# Patient Record
Sex: FEMALE | Race: WHITE | NOT HISPANIC OR LATINO | Employment: UNEMPLOYED | URBAN - METROPOLITAN AREA
[De-identification: names, ages, dates, MRNs, and addresses within clinical notes are randomized per-mention and may not be internally consistent; named-entity substitution may affect disease eponyms.]

---

## 2018-01-09 ENCOUNTER — CONVERSION ENCOUNTER (OUTPATIENT)
Dept: FAMILY MEDICINE CLINIC | Facility: CLINIC | Age: 58
End: 2018-01-09

## 2018-01-09 ENCOUNTER — OFFICE VISIT CONVERTED (OUTPATIENT)
Dept: FAMILY MEDICINE CLINIC | Facility: CLINIC | Age: 58
End: 2018-01-09
Attending: NURSE PRACTITIONER

## 2019-04-01 ENCOUNTER — HOSPITAL ENCOUNTER (OUTPATIENT)
Dept: URGENT CARE | Facility: CLINIC | Age: 59
Discharge: HOME | End: 2019-04-01

## 2020-02-17 ENCOUNTER — HOSPITAL ENCOUNTER (OUTPATIENT)
Dept: OTHER | Facility: HOSPITAL | Age: 60
Discharge: HOME | End: 2020-02-17
Attending: INTERNAL MEDICINE

## 2020-02-17 LAB
25(OH)D3 SERPL-MCNC: 19.7 NG/ML (ref 30–100)
ALBUMIN SERPL-MCNC: 4.2 G/DL (ref 3.5–5)
ALBUMIN/GLOB SERPL: 1.4 {RATIO} (ref 1.4–2.6)
ALP SERPL-CCNC: 83 U/L (ref 53–141)
ALT SERPL-CCNC: 12 U/L (ref 10–40)
ANION GAP SERPL CALC-SCNC: 19 MMOL/L (ref 8–19)
APPEARANCE UR: ABNORMAL
AST SERPL-CCNC: 20 U/L (ref 15–50)
BASOPHILS # BLD AUTO: 0.03 10*3/UL (ref 0–0.2)
BASOPHILS NFR BLD AUTO: 0.5 % (ref 0–3)
BILIRUB SERPL-MCNC: 0.17 MG/DL (ref 0.2–1.3)
BILIRUB UR QL: NEGATIVE
BUN SERPL-MCNC: 16 MG/DL (ref 5–25)
BUN/CREAT SERPL: 18 {RATIO} (ref 6–20)
CALCIUM SERPL-MCNC: 9.3 MG/DL (ref 8.7–10.4)
CHLORIDE SERPL-SCNC: 95 MMOL/L (ref 99–111)
COLOR UR: ABNORMAL
CONV ABS IMM GRAN: 0.02 10*3/UL (ref 0–0.2)
CONV BACTERIA: ABNORMAL
CONV CO2: 29 MMOL/L (ref 22–32)
CONV COLLECTION SOURCE (UA): ABNORMAL
CONV CREATININE URINE, RANDOM: 175.1 MG/DL (ref 10–300)
CONV HYALINE CASTS IN URINE MICRO: ABNORMAL /[LPF]
CONV IMMATURE GRAN: 0.3 % (ref 0–1.8)
CONV MICROALBUM.,U,RANDOM: 41.3 MG/L (ref 0–20)
CONV TOTAL PROTEIN: 7.3 G/DL (ref 6.3–8.2)
CONV UROBILINOGEN IN URINE BY AUTOMATED TEST STRIP: 1 {EHRLICHU}/DL (ref 0.1–1)
CREAT UR-MCNC: 0.87 MG/DL (ref 0.5–0.9)
DEPRECATED RDW RBC AUTO: 51.3 FL (ref 36.4–46.3)
EOSINOPHIL # BLD AUTO: 0.27 10*3/UL (ref 0–0.7)
EOSINOPHIL # BLD AUTO: 4.6 % (ref 0–7)
ERYTHROCYTE [DISTWIDTH] IN BLOOD BY AUTOMATED COUNT: 17.3 % (ref 11.7–14.4)
GFR SERPLBLD BASED ON 1.73 SQ M-ARVRAT: >60 ML/MIN/{1.73_M2}
GLOBULIN UR ELPH-MCNC: 3.1 G/DL (ref 2–3.5)
GLUCOSE SERPL-MCNC: 108 MG/DL (ref 65–99)
GLUCOSE UR QL: NEGATIVE MG/DL
HCT VFR BLD AUTO: 53 % (ref 37–47)
HGB BLD-MCNC: 15.3 G/DL (ref 12–16)
HGB UR QL STRIP: NEGATIVE
KETONES UR QL STRIP: NEGATIVE MG/DL
LEUKOCYTE ESTERASE UR QL STRIP: NEGATIVE
LYMPHOCYTES # BLD AUTO: 1.12 10*3/UL (ref 1–5)
LYMPHOCYTES NFR BLD AUTO: 19.2 % (ref 20–45)
MCH RBC QN AUTO: 24.8 PG (ref 27–31)
MCHC RBC AUTO-ENTMCNC: 28.9 G/DL (ref 33–37)
MCV RBC AUTO: 85.8 FL (ref 81–99)
MICROALBUMIN/CREAT UR: 23.6 MG/G{CRE} (ref 0–35)
MONOCYTES # BLD AUTO: 0.45 10*3/UL (ref 0.2–1.2)
MONOCYTES NFR BLD AUTO: 7.7 % (ref 3–10)
NEUTROPHILS # BLD AUTO: 3.95 10*3/UL (ref 2–8)
NEUTROPHILS NFR BLD AUTO: 67.7 % (ref 30–85)
NITRITE UR QL STRIP: NEGATIVE
NRBC CBCN: 0 % (ref 0–0.7)
OSMOLALITY SERPL CALC.SUM OF ELEC: 288 MOSM/KG (ref 273–304)
PH UR STRIP.AUTO: 5 [PH] (ref 5–8)
PLATELET # BLD AUTO: 229 10*3/UL (ref 130–400)
PMV BLD AUTO: 10.3 FL (ref 9.4–12.3)
POTASSIUM SERPL-SCNC: 4.6 MMOL/L (ref 3.5–5.3)
PROT UR QL: ABNORMAL MG/DL
PROT UR-MCNC: 37.7 MG/DL
RBC # BLD AUTO: 6.18 10*6/UL (ref 4.2–5.4)
RBC #/AREA URNS HPF: ABNORMAL /[HPF]
SODIUM SERPL-SCNC: 138 MMOL/L (ref 135–147)
SP GR UR: 1.03 (ref 1–1.03)
SQUAMOUS SPT QL MICRO: ABNORMAL /[HPF]
T4 FREE SERPL-MCNC: 0.9 NG/DL (ref 0.9–1.8)
TSH SERPL-ACNC: 2.69 M[IU]/L (ref 0.27–4.2)
WBC # BLD AUTO: 5.84 10*3/UL (ref 4.8–10.8)
WBC #/AREA URNS HPF: ABNORMAL /[HPF]

## 2020-02-19 LAB
CONV HEPATITIS B SURFACE AG W CONFIRMATION RE: NEGATIVE
HBV CORE AB SER DONR QL IA: POSITIVE
HBV CORE IGM SERPL QL IA: NEGATIVE
HBV E AB SERPL QL IA: POSITIVE
HBV E AG SERPL QL IA: NEGATIVE
HBV SURFACE AB SER QL: REACTIVE

## 2020-05-06 ENCOUNTER — OFFICE VISIT CONVERTED (OUTPATIENT)
Dept: RADIATION ONCOLOGY | Facility: HOSPITAL | Age: 60
End: 2020-05-06
Attending: PHYSICIAN ASSISTANT

## 2020-05-11 ENCOUNTER — HOSPITAL ENCOUNTER (OUTPATIENT)
Dept: PREADMISSION TESTING | Facility: HOSPITAL | Age: 60
Discharge: HOME | End: 2020-05-11
Attending: PHYSICIAN ASSISTANT

## 2020-05-12 LAB — SARS-COV-2 RNA SPEC QL NAA+PROBE: NOT DETECTED

## 2020-05-13 ENCOUNTER — HOSPITAL ENCOUNTER (OUTPATIENT)
Dept: CARDIOLOGY | Facility: HOSPITAL | Age: 60
Discharge: HOME | End: 2020-05-13
Attending: PHYSICIAN ASSISTANT

## 2020-05-14 ENCOUNTER — HOSPITAL ENCOUNTER (OUTPATIENT)
Dept: PET IMAGING | Facility: HOSPITAL | Age: 60
Discharge: HOME | End: 2020-05-14
Attending: PHYSICIAN ASSISTANT

## 2020-05-19 ENCOUNTER — OFFICE VISIT CONVERTED (OUTPATIENT)
Dept: RADIATION ONCOLOGY | Facility: HOSPITAL | Age: 60
End: 2020-05-19
Attending: INTERNAL MEDICINE

## 2020-06-05 ENCOUNTER — HOSPITAL ENCOUNTER (OUTPATIENT)
Dept: RADIATION ONCOLOGY | Facility: HOSPITAL | Age: 60
Setting detail: RECURRING SERIES
Discharge: STILL ADMITTED OR ADMINISTRATIVE | End: 2020-07-16
Attending: RADIOLOGY

## 2020-09-01 ENCOUNTER — HOSPITAL ENCOUNTER (OUTPATIENT)
Dept: CT IMAGING | Facility: HOSPITAL | Age: 60
Discharge: HOME | End: 2020-09-01
Attending: RADIOLOGY

## 2020-09-03 ENCOUNTER — HOSPITAL ENCOUNTER (OUTPATIENT)
Dept: RADIATION ONCOLOGY | Facility: HOSPITAL | Age: 60
Discharge: HOME | End: 2020-09-03
Attending: RADIOLOGY

## 2020-12-01 ENCOUNTER — HOSPITAL ENCOUNTER (OUTPATIENT)
Dept: CT IMAGING | Facility: HOSPITAL | Age: 60
Discharge: HOME | End: 2020-12-01
Attending: RADIOLOGY

## 2020-12-03 ENCOUNTER — HOSPITAL ENCOUNTER (OUTPATIENT)
Dept: RADIATION ONCOLOGY | Facility: HOSPITAL | Age: 60
Discharge: HOME | End: 2020-12-03
Attending: NURSE PRACTITIONER

## 2021-01-06 ENCOUNTER — OFFICE VISIT CONVERTED (OUTPATIENT)
Dept: RADIATION ONCOLOGY | Facility: HOSPITAL | Age: 61
End: 2021-01-06

## 2021-01-07 ENCOUNTER — OFFICE VISIT CONVERTED (OUTPATIENT)
Dept: RADIATION ONCOLOGY | Facility: HOSPITAL | Age: 61
End: 2021-01-07

## 2021-02-04 ENCOUNTER — HOSPITAL ENCOUNTER (OUTPATIENT)
Dept: CT IMAGING | Facility: HOSPITAL | Age: 61
Discharge: HOME | End: 2021-02-04
Attending: NURSE PRACTITIONER

## 2021-02-04 LAB
CREAT BLD-MCNC: 0.8 MG/DL (ref 0.6–1.4)
GFR SERPLBLD BASED ON 1.73 SQ M-ARVRAT: >60 ML/MIN/{1.73_M2}

## 2021-02-10 ENCOUNTER — HOSPITAL ENCOUNTER (OUTPATIENT)
Dept: RADIATION ONCOLOGY | Facility: HOSPITAL | Age: 61
Discharge: HOME | End: 2021-02-10
Attending: NURSE PRACTITIONER

## 2021-03-18 ENCOUNTER — HOSPITAL ENCOUNTER (OUTPATIENT)
Dept: VACCINE CLINIC | Facility: HOSPITAL | Age: 61
Discharge: HOME | End: 2021-03-18
Attending: INTERNAL MEDICINE

## 2021-04-08 ENCOUNTER — HOSPITAL ENCOUNTER (OUTPATIENT)
Dept: VACCINE CLINIC | Facility: HOSPITAL | Age: 61
Discharge: HOME | End: 2021-04-08
Attending: INTERNAL MEDICINE

## 2021-05-03 ENCOUNTER — HOSPITAL ENCOUNTER (OUTPATIENT)
Dept: MAMMOGRAPHY | Facility: HOSPITAL | Age: 61
Discharge: HOME | End: 2021-05-03
Attending: NURSE PRACTITIONER

## 2021-05-04 ENCOUNTER — HOSPITAL ENCOUNTER (OUTPATIENT)
Dept: CT IMAGING | Facility: HOSPITAL | Age: 61
Discharge: HOME | End: 2021-05-04
Attending: NURSE PRACTITIONER

## 2021-05-16 VITALS
HEART RATE: 79 BPM | BODY MASS INDEX: 28.17 KG/M2 | WEIGHT: 149.19 LBS | DIASTOLIC BLOOD PRESSURE: 70 MMHG | HEIGHT: 61 IN | SYSTOLIC BLOOD PRESSURE: 140 MMHG | RESPIRATION RATE: 12 BRPM | DIASTOLIC BLOOD PRESSURE: 81 MMHG | SYSTOLIC BLOOD PRESSURE: 161 MMHG | TEMPERATURE: 98.2 F | OXYGEN SATURATION: 92 %

## 2021-05-19 ENCOUNTER — HOSPITAL ENCOUNTER (OUTPATIENT)
Dept: RADIATION ONCOLOGY | Facility: HOSPITAL | Age: 61
Discharge: HOME | End: 2021-05-19
Attending: NURSE PRACTITIONER

## 2021-05-22 ENCOUNTER — TRANSCRIBE ORDERS (OUTPATIENT)
Dept: ADMINISTRATIVE | Facility: HOSPITAL | Age: 61
End: 2021-05-22

## 2021-05-22 DIAGNOSIS — C34.12 CANCER OF UPPER LOBE OF LEFT LUNG (HCC): Primary | ICD-10-CM

## 2021-05-28 VITALS
TEMPERATURE: 98.5 F | OXYGEN SATURATION: 98 % | SYSTOLIC BLOOD PRESSURE: 159 MMHG | DIASTOLIC BLOOD PRESSURE: 90 MMHG | HEART RATE: 84 BPM | RESPIRATION RATE: 24 BRPM

## 2021-05-28 NOTE — PROGRESS NOTES
"Patient: GABRIELLA JOHN     Acct: [id number]     Report: #[id number]  UNIT #: [id number]     : 1960    Encounter Date:2020  PRIMARY CARE: Shanice Loo  ***Signed***  --------------------------------------------------------------------------------------------------------------------  TELEHEALTH NOTE      History of Present Illness            Chief Complaint: [facility] F/U            Gabriella John is presenting for evaluation via Telehealth visit. Verbal     consent obtained before beginning visit.            Provider spent (21) minutes with the patient during telehealth visit.            The following staff were present during the visit: Mallika Kuhn CMA, Rosalina Palacio PA-C            The patient is a 59 year old female recently seen by Dr. Smith while     hospitalized at [facility] on 2020.  She had a history of     COPD diagnosed \"many years ago\" by patient's report and is a longstanding heavy     smoker. She has smoked between one pack and a half and two packs per day for 40     plus years.  She had presented with worsening dyspnea for several days and had     weight gain and lower extremity edema.  She was found to have a new 1.8 cm left     upper lobe nodule concerning for malignancy and thus we were consulted. She was     treated with nebulizers and Spiriva.  She was started on nicotine replacement     therapy and was discharged on supplemental oxygen with plans for outpatient PET     scan recommended. She did also have alpha 1 antitrypsin testing done during her     hospital stay and results were not back yet at her time of discharge.  She tells     me her breathing is doing much better now. She reports compliance with the     Brovana and Pulmicort nebs and the Spiriva Respimat 2.5, but does not wish to     stay on the nebulizers.  She tells me she would rather use scheduled inhalers as     they are less time consuming. She is concerned about her lung nodule and " is     anxious about that now. She is trying to quit smoking and is down to one pack     every 4-5 days now. She currently denies any increased dyspnea, cough or     wheezing. Denies hemoptysis, fever or chills. She had never followed with a     lmonologist before.            I have reviewed ROS, medical, surgical and family history and agree with those     as entered in the chart. I personally reviewed hospital records, previous lab     work, imaging and provider notes.                       [facility]                PACS RADIOLOGY REPORT            Patient: DARSHAN JOHN   Acct: #[id number]   Report: #[id number]            UNIT #: [id number]    DOS: 20 0834      INSURANCE:[employer]WORK - O   ORDER #:[id number]      LOCATION:[facility]  0226-01   : 1960            PROVIDERS      ADMITTING:  ANH WALSH   ATTENDING: ANH WALSH      FAMILY:  CindaSterlingdaniel   ORDERING:  ANH WALSH         OTHER:    DICTATING:  DEJAN ROBINS MD            REQ #:[id number]   EXAM:[facility] - CT CHEST without CONTRAST      REASON FOR EXAM:  severe hypoxia      REASON FOR VISIT:  COPD            *******Signed******         PROCEDURE:   CT CHEST WITHOUT CONTRAST             COMPARISON:   [facility], CT, CHEST W/ CONTRAST, 2013,     15:39.  [facility], CT, CT CHEST ABD PEL W CONTRAST, 2019, 18:31.             INDICATIONS:   SEVERE HYPOXIA, HX COPD             TECHNIQUE:   CT images were created without the administration of contrast     material.               PROTOCOL:     Standard imaging protocol performed                RADIATION:     DLP: 397mGy*cm          Automated exposure control was utilized to minimize radiation dose.              FINDINGS:         Visualized lower neck without acute soft tissue abnormality.  Redemonstration of     1 cm right thyroid       lobe nodule which is unchanged.  There  is enlarged prevascular lymph node which     is stable from       multiple prior studies measuring 12 mm on image 33.  Heart size normal.      Coronary calcifications       noted.  Calcified precarinal and right hilar nodes related to granulomatous     disease.  No axillary       adenopathy.  No pericardial effusion or pleural effusion.             Trachea and mainstem bronchi are patent.  Within the left upper lobe there is a     new 1.8 x 1.5 cm       pulmonary nodule.  Moderate centrilobular emphysema noted.  There is a     subpleural 3 mm right upper       lobe nodule on image 27 which is stable.  Included upper abdomen demonstrates     several hepatic cysts       , for example in the left hepatic measuring 2 cm on image 67. Additional cyst in     the right hepatic       lobe measures 2.1 cm.  Normal adrenal glands.  No free fluid or air in the upper     abdomen.        Multilevel disc disease in the thoracic and visualized lumbar spine.  No     aggressive osseous lesion       or acute fracture.  Small areas of sclerosis at the inferior endplate of T6 in     the superior       endplate of T10 likely related to disc disease, unchanged from prior exam.               CONCLUSION:         1. New 1.8 cm left upper lobe pulmonary nodule may relate to malignancy,     recommend PET-CT for       further assessment.      2. Stable enlarged prevascular lymph node unchanged from 2013. No new     adenopathy.      3. Emphysema.      4. Coronary atherosclerotic disease.                DEJAN ROBINS MD             Electronically Signed and Approved By: DEJAN ROBINS MD on 4/28/2020 at 9:31                        Until signed, this is an unconfirmed preliminary report that may contain      errors and is subject to change.                                              LIZ:      D:04/28/20 0931                         Past Med History      HX: COPD, Lung Nodule      Current Smoker x40 years, 1 pack will last 4 days       Vaccines -  Declines all      Overview of Symptoms      Denies: cough, SOA, wheezing, fever, chills, body aches            Most Recent Lab Findings      Laboratory Tests      4/26/20 19:38            4/29/20 04:02            Allergies/Medications      Allergies:        Coded Allergies:             NO KNOWN ALLERGIES (Unverified , 12/27/16)      Medications    Last Reconciled on 5/6/20 13:47 by KELLEN COOK      Nicotine 14 Mg Patch (Nicotine 14 Mg Patch) 1 Each Patch.td24      14 MG TRANSDERM QDAY for 30 Days, PATCH         Prov: ROBLES WALSHC         4/30/20       Carvedilol (Carvedilol) 6.25 Mg Tablet      6.25 MG PO BID for 30 Days, #60 TAB         Prov: ANH WALSH         4/30/20       Furosemide (Furosemide) 20 Mg Tablet      20 MG PO QDAY for 30 Days, #30 TAB         Prov: ANH WALSH         4/30/20       Albuterol Sulfate (Albuterol Sulfate) 1.25 Mg/3 Ml Vial.neb      1.25 MG INH Q6H PRN for SHORTNESS OF BREATH, #120 NEB 3 Refills         Prov: ARABELLA BUSTILLO         4/29/20       Tiotropium Bromide (Spiriva Respimat 2.5 mcg/Puff) 4 Gm Mist.inhal      2 PUFFS INH RTQDAY, #1 MDI 0 Refills         Prov: ARABELLA BUSTILLO         4/29/20       Neb-Budesonide (Pulmicort) 0.5 Mg/2 Ml Ampul.neb      0.5 MG INH BID, #60 NEB         Prov: ARABELLA BUSTILLO         4/29/20       Arformoterol Tartrate (Brovana) 15 Mcg/2 Ml Vial.neb      15 MCG INH BID for 15 Days, #30 NEB 0 Refills         Prov: ARABELLA BUSTILLO         4/29/20       Arformoterol Tartrate (Brovana) 15 Mcg/2 Ml Vial.neb      15 MCG INH BID for 15 Days, #30 NEB 0 Refills         Prov: ARABELLA BUSTILLO         4/29/20       Venlafaxine XR (Effexor XR) 150 Mg Cap.er.24h      150 MG PO QDAY         Reported         4/26/20       ARIPiprazole (ABILIFY) 5 Mg Tab      5 MG PO QDAY         Reported         4/26/20       Gabapentin (Gabapentin) 400 Mg Capsule      400 MG PO TID         Reported         4/26/20       Cyanocobalamin (Vitamin B-12) 5,000 Mcg Tab.subl      5000  MCG SL QDAY, TAB         Reported         4/26/20       Multivitamins (Multi-Vitamin) 1 Each Tablet      1 TAB PO QDAY, #30 TAB 0 Refills         Reported         4/26/20       Ascorbic Acid (Ascorbic Acid) 250 Mg Tablet      250 MG PO QDAY, #30 TAB         Reported         4/26/20       Clopidogrel Bisulfate (Plavix) 75 Mg Tablet      75 MG PO HS, #30 TAB         Prov: Sterling Lood         6/20/19       Aspirin Chew (Aspirin Baby) 81 Mg Tab.chew      81 MG PO QDAY, #30 TAB.CHEW 0 Refills         Prov: Isidoro Looalid         6/20/19       MDI-Albuterol (Proair HFA) 60 Puffs/Inh Puffs      1 PUFFS INH PRN for SHORTNESS OF BREATH, INH         Reported         12/19/13            Plan/Instructions      Ambulatory Assessment/Plan:        COPD (chronic obstructive pulmonary disease) - J44.9            Lung nodule, solitary - R91.1            Notes      New Medications      * Budesonide/Formoterol Fumarate (Symbicort 160/4.5 Mcg) 10.2 GM INH: 2 PUFF INH       BID #1         Dx: Lung nodule, solitary - R91.1      Renewed Medications      * TIOTROPIUM BROMIDE (Spiriva Respimat 2.5 mcg/Puff) 4 GM MIST.INHAL: 2 PUFFS       INH RTQDAY #1      Discontinued Medications      * ARFORMOTEROL TARTRATE (Brovana) 15 MCG/2 ML VIAL.NEB: 15 MCG INH BID 15 Days       #30         Instructions: Two Rx inentionally sent for Brovana Vouchers.      * ARFORMOTEROL TARTRATE (Brovana) 15 MCG/2 ML VIAL.NEB: 15 MCG INH BID 15 Days       #30         Instructions: Two Rx inentionally sent for Brovana Vouchers.      * Neb-Budesonide (Pulmicort) 0.5 MG/2 ML AMPUL.NEB: 0.5 MG INH BID #60      * predniSONE 20 MG TABLET: 40 MG PO QDAY 4 Days #8      New Diagnostics      * PFT-Comp, PrePost,DLCO,BodyBox, Week         Dx: COPD (chronic obstructive pulmonary disease) - J44.9      * PET SKBASE-MIDTHIGH INI NETSP, Week         Dx: Lung nodule, solitary - R91.1      * Pre-op COVID 19 Screening, Week         Dx: Pre-procedural examination - Z01.818       Plan/Instructions            * Plan Of Care: ()            * Chronic conditions reviewed and taken into consideration for today's treatment       plan.      * Patient instructed to seek medical attention urgently for new or worsening       symptoms.      * Patient was educated/instructed on their diagnosis, treatment and medications       prior to discharge from the clinic today.            ASSESSMENT:       1.  Recent acute hypoxic respiratory failure, now requiring continuous     supplemental oxygen.      2. Chronic hypercapnic respiratory failure.      3.  Left upper lobe 1.8 nodule with previous heavy smoking history, will need     PET scan.      4. Ongoing heavy tobacco abuse with cigarettes with up to 80 pack year history,     now down to one pack every four days.      5. Emphysema on chest CT.      6. COPD, severity currently unknown with recent acute exacerbation.      7.  Recent acute diastolic heart failure exacerbation.      8. Chronic cough.      9.  Calcified granuloma in the right lower lobe.            PLAN:      1.  At this time I have discussed with patient regarding her recent     hospitalizations and plans and recommendations going forward.  I have also     discussed with Dr. Smith, appreciate her input.  I will have patient scheduled     for PET scan as soon as possible and then we will have her follow up in     [facility] to discuss results and further plans going     forward. I have answered all of patient's questions about this today.        2. She will also need baseline PFTs and as the pulmonary lab is reportedly     opening today, I will see if those can be done in the coming weeks.  I did     discuss with patient that alpha 1 antitrypsin testing done during the hospital     stay revealed a normal genotype of MM and normal level. I have discussed with     her in detail how and when to use her breathing medications and have given her     different options of what long  acting breathing meds she can use.  She prefers     to switch to long acting inhalers, so I will have her start Symbicort 160 with     two puffs twice a day.  Continue Spiriva Respimat 2.5 with two puffs once daily     and discontinue Brovana and Pulmicort nebs once she receives the Symbicort.  She     has verbalized understanding of how and when she uses her medications.  She is     to use albuterol as needed.        3.  I counseled the patient on smoking cessation for 5 minutes, offered nicotine     replacement therapy and  pharmacotherapy and she declines.  She is already     using nicotine replacement therapy and wishes to continue on this.        4. Follow up in lung clinic after PET scan is done, sooner if needed.      Codes:  Phone Eval 21-30 mi [phone]            Electronically signed by JESÚS RICHARDSON PA-C  05/26/2020 13:47       Disclaimer: Converted document may not contain table formatting or lab diagrams. Please see [employer] for the authenticated document.

## 2021-05-28 NOTE — PROGRESS NOTES
Patient: DARSHAN JOHN     Acct: [id number]     Report: #[id number]  UNIT #: [id number]     : 1960    Encounter Date:2021  PRIMARY CARE: Shanice Loo  ***Signed***  --------------------------------------------------------------------------------------------------------------------  Chief Complaint      LUNG CANCER            Referring Provider/Copies To      Referring Provider:  CASTILLO SOL      Provider Not Found in Lookup:  NOELLE BRYAN      Copies To:   Carl Danielle; CARL CALABRESE                                          [facility]                                     Radiation Oncology                                     [address] [place] [address]                                        [phone]            ,            Dear Dr. Shanice Loo,            Darshan John was seen in our office on    21. The purpose of her visit     was to evaluate for wellness and any ongoing side effects related to her cancer     diagnosis and treatment as well as discuss the plan for recommended follow up     care.            The attached report is a summary of the treatment received by Ms. John to     date.   You will find the cancer SURVIVORSHIP TREATMENT SUMMARY AND CARE Plan     attached to this communication and it may also be found as part of the     electronic medical records in [facility].   This is in compliance with standards of    quality cancer care outlined by the Commission on Cancer (Dorian), the American     Society of Clinical Oncology (ASCO), and the National Comprehensive Cancer     Network (NCCN).               Today, I discussed the treatment summary and plan of care in detail with Darshan John  and have provided a written copy of the plan.   She  was advised to     continue with regular follow-up care with NOELLE Bryan treating PCP in accordance     with the national guidelines while transitioning back to  their PCP for general     healthcare needs.   If you have any questions regarding your patient's     survivorship care please feel free to contact me.   Thank you for allowing me to     participate in the care of your patient.               Warm Regards,            CHARLY Santoyo, APRN, OCN      Oncology Survivorship Nurse Practitioner      O: [phone]      F: [phone]            Allergies      Coded Allergies:             NO KNOWN ALLERGIES (Unverified , 5/19/20)            Medications      Last Reconciled on 1/6/21 15:42 by AMADOR SUMMERS      DULoxetine (Cymbalta) Unknown Strength Capsule.dr      PO BID, #120 CAP 0 Refills         Reported         1/6/21       Budesonide/Formoterol Fumarate (Symbicort 160/4.5 Mcg) 10.2 Gm Inh      2 PUFF INH BID, #1 INH 5 Refills         Prov: JESÚS RICHARDSON PA-C         5/6/20       Tiotropium Bromide (Spiriva Respimat 2.5 mcg/Puff) 4 Gm Mist.inhal      2 PUFFS INH RTQDAY, #1 MDI 5 Refills         Prov: JESÚS RICHARDSON PA-C         5/6/20       Nicotine 14 Mg Patch (Nicotine 14 Mg Patch) 1 Each Patch.td24      14 MG TRANSDERM QDAY for 30 Days, PATCH         Prov: ANH WALSH         4/30/20       Carvedilol (Carvedilol) 6.25 Mg Tablet      6.25 MG PO BID for 30 Days, #60 TAB         Prov: ROBLES WALSHC         4/30/20       Furosemide (Furosemide) 20 Mg Tablet      20 MG PO QDAY for 30 Days, #30 TAB         Prov: ANH WALSH         4/30/20       Albuterol Sulfate (Albuterol Sulfate) 1.25 Mg/3 Ml Vial.neb      1.25 MG INH Q6H PRN for SHORTNESS OF BREATH, #120 NEB 3 Refills         Prov: ARABELLA BUSTILLO         4/29/20       ARIPiprazole (ABILIFY) 5 Mg Tab      5 MG PO QDAY         Reported         4/26/20       Gabapentin (Gabapentin) 400 Mg Capsule      400 MG PO TID         Reported         4/26/20       Multivitamins (Multi-Vitamin) 1 Each Tablet      1 TAB PO QDAY, #30 TAB 0 Refills         Reported         4/26/20       Ascorbic Acid (Ascorbic  Acid) 250 Mg Tablet      250 MG PO QDAY, #30 TAB         Reported         4/26/20       Clopidogrel Bisulfate (Plavix) 75 Mg Tablet      75 MG PO HS, #30 TAB         Prov: Shanice Loo         6/20/19       Aspirin Chew (Aspirin Baby) 81 Mg Tab.chew      81 MG PO QDAY, #30 TAB.CHEW 0 Refills         Prov: Shanice Loo         6/20/19       MDI-Albuterol (Proair HFA) 60 Puffs/Inh Puffs      1 PUFFS INH PRN for SHORTNESS OF BREATH, INH         Reported         12/19/13      Medications Reviewed:  Changes made to meds (added cymbalta)            PAST, FAMILY   Past Medical History      COPD, Depression, Hypertension, Short of Air      Hematology/oncology:  REPORTS HX OF: Lung cancer (stage I, left upper lobe)            Past Surgical History      REPORTS HX OF: Hysterectomy, Other Past Surgical Hx (multiple teeth extractions,    c-sec)            Family History      DENIES HX OF: Anemia, Blood disorders, Blood Cancer, Breast cancer, Cervical     cancer, Coagulopathy, Colorectal cancer, Endocrine Cancer, Eye Cancer, GI     Cancer,  Cancer, Kidney Cancer, Leukemia, Leukocytosis, Leukopenia, Liver     Cancer, Lung cancer, Lymphoma, Melanoma, Musculoskeletal Cancer, Myeloma,     Neurologic Cancer, Oral Cancer, Ovarian cancer, Prostate cancer, Skin Cancer,     Stomach Cancer, Testicular Cancer, Thrombocytopenia, Thyroid cancer, Uterine     cancer, Other Cancer History, Other Hematology History            Social History      Lives independently:  Yes      Occupation:  Retired            Tobacco Use      Tobacco status:  Current every day smoker (0.5 ppd, previous smoking of 1-2     packs for 45 years)      Smoking packs/day:  0.5            Alcohol Use      Alcohol intake:  None            Substance Use      Substance use:  Denies use            HISTORY OF PRESENT ILLNESS      Interim History      Treatment History Summary      Ms. Jha is a pleasant 60 yea rold with presumed stage 1 lung cancer (FDG     avid left  upper lobe nodule).  The following summarizes her history.            On June 18, 2019, Ms. Jha underwent CT of chest, abdomen and pelvis due to     abnormal weight loss ordered per Dr Loo.  She was hospitalized at this time     due to ataxia. Imaging resulted as no acute abnormality in chest, short segment     of sigmoid colon demonstrates wall thickening which could be related to prior     diverticulitis and colonoscopy was recommended.  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             On April 28, 2020, Ms. Jha was admitted for CHF.  She underwent CT of chest     without contrast while admitted for hypoxia.  A new ETELVINA 1.8 x 1.5 cm pulmonary     nodule was noted. Stable enlarged prevascular lymph node was noted and was     unchanged from 2013. No new adenopathy.             On May 14, 2020, Ms Jha had a PET CT which demonstrated a 1.8 cm     hypermetabolic nodule in ETELVINA.  SUV was 7.             On May 29, 2020, Ms. Jha was evaluated by Dr. Carl Kurtz in radiation     oncology.  She was noted to have advanced COPD and deemed not a surgical     candidate.              From June 22, 2020 to July 1, 2020, Ms. Jha received SBRT 5/5 fractions,     total dose of 5500 cGy.             On September 1, 2020, a repeat CT of chest without contrast was performed and     interval decrease in LLL nodule was noted (was 1.8 and now 1.1cm). Cavitary     lesion in the RUL with small nodule was not FDG avid on above PET and appeared     stable.  Prominent prevascular lymph node was noted and stable. It was not PET     avid previously but is enlarged by criteria.             On December 1, 2020, repeat CT of chest for surveillance was obtained. No     interval change in the spiculated density in the ETELVINA, may represent scarring.      Stable cavitary lesion with small peripheral 5 mm nodule in RUL noted, and     stable prevascular LN noted once again.  Review of images from  April 2020 till     now did reveal an enlarging RLL lesion.  This was not mentioned in the above     scans.  Patient notified and will perform a short term follow up.            REVIEW OF SYSTEMS      General:  Complains of: Fatigue, Weight loss (10 lbs in 6 months); Denies:     Appetite change      Eyes:  Complains of: Corrective lenses; Denies: Blurred vision      Ears, nose, mouth, throat:  Denies: Headache, Seizures, Visual Changes, Hearing     loss, Sinus Congestion, Hoarseness, Sore throat, Other      Cardiovascular:  Complains of: Palpitations (with nervousness)      Gastrointestinal:  Denies: Nausea, Vomiting, Problem swallowing, Frequent     heartburn, Constipation, Diarrhea, Tarry stools, Bloody stools, Unable to     control bowels, Other      Musculoskeletal:  Denies: New Back pain, Leg Cramps, Painful Joints, Swollen     Joints, Muscle Pain, Muscle weakness, Other      Skin:  DENIES: Jaundice, Easy Bleeding, Lesions/changes in moles, Nail changes,     Skin Discoloration, Rash, Other      Neurological:  Complains of: Numbness\Tingling (in feet and hands); Denies:     Dizziness, Fainting, Paralysis      Psychiatric:  Complains of: Anxiety (Anxiety CHESTER screen was 15.  Is on cymbalta     and abilify for depression.  Does not help anxiety), AAO X 3, Depression, Memory    loss; Denies: Panic attacks      Endocrine:  DiabetesThyroid DisorderOsteoporosisEndocrine Other      Hematologic/lymphatic:  Denies: Bruising, Bleeding, Enlarged Lymph Nodes,     Recurrent infections, Other      Reproductive:  Complains of Menopause; Denies Still Menstruating            VITAL SIGNS AND SCORES      Vitals      Temperature 98.5 F / 36.94 C      Pulse 84      Respirations 24      Blood Pressure 159/90 Sitting, Right Arm      Pulse Oximetry 98%            Pain Score      Experiencing any pain?:  Yes      Pain Scale Used:  Numerical      Pain Intensity:  10      Pain Comment:        right arm with movement, comes and goes when  she lift it up            Fatigue Score      Experiencing any fatigue?:  Yes      Fatigue (0-10 scale):  6            EXAM      General Appearance:  Alert, Oriented X3, Cooperative, No acute distress      Eyes:  Anicteric Sclerae, Moist Conjunctiva, PERRLA      HEENT:  Orophraynx clear; No No Erythema, No Pallor      Neck:  Supple, Full ROM, No Masses or JVD      Respiratory:  CTAB      Cardio:  RRR, No Murmur, Normal PMI      Skin:  Normal Texture and Turgor      Psychiatric:  Appropriate Affect, Intact Judgement, AAO x3      Neuro:  Cranial Ner II-XII Intact      Muscularskeletal:  Normal Gait and Station      Extremities:  No No Digital Cyanosis, No No Digital Ischemia      Lymphatic:  No Cervical, No Infraclavicular, No Preauricular, No Supraclavicular            PREVENTION      Hx Influenza Vaccination:  Yes      2 or More Falls in Past Year?:  No      Hx Pneumococcal Vaccination:  Yes      Encouraged to follow-up with:  PCP regarding preventative exams.      Chart initiated by      OLIVIA Roman RN            IMPRESSION/PLAN      Impression      1. Survivorship Appointment      2. Ms. Jha is a 60 year old pleasant female with presumed Stage 1 ETELVINA lung     cancer (FDG avid ETELVINA nodule consistent with primary pulmonary malignancy) status    post SBRT completing on July 1, 2020 (5,500 cGy).       3. Tobacco Abuse      4. RLL nodule      5. Anxiety            Plan - Survivorship Discussion      Survivorship Discussion      SURVEILLANCE:       -Survivorship care plan and treatment history was given to patient,            -Will continue active surveillance with Dr. Kurtz.  Last imaging on December 1, 2020.  Will obtain history and physical and chest CT plus/minus contrast every     3-6 months for 3 years.  Then history and physical and chest CT every 6 months     for two years, then history and physical and a low dose CT annually.  Residual     or new radiographic abnormalities may require more frequent imaging.              MONITORING FOR LATE EFFECTS:            -Rib cage fractures and chest wall pain are common side effects of SBRT to     peripheral lung lesions. Median onset of chest wall pain ranges from 6-9 months     following treatment and median time to rib fracture ranges from 13-22 months     post treatment.  Both are associated with long healing process and permanent     dislocation of ribs.  Extended healing time from this is different than trauma     which is normally 1-3 months.              -Fatigue: Fatigue has been reported and known side effect of radiation as well     as chemotherapy.  Current research encourages exercise to to assess for other     causes of fatigue including sleep apnea, endocrinopathies.             -Cardiovascular: Risk factors for cardiovascular disease including long term     history of smoking, hypertension.  There is a small risk of heart disease and     calcification of aorta but again this is a small risk in a population that is     already at high risk.  Clinical signs and symptoms will guide further     investigation.  Recommended discussing risk factors with his primary care     provider which will guide further evaluation.  Recommended a plant-based diet     with fruits and vegetables, whole grains and legumes rich in fiber (25 g daily)     and nutrients.  Limit refined grains and sugary foods, proteins high in     saturated and avoidance of processed meats.  Encourage fats rich in Omega 3s and    limiting Omega 6s.              -Pulmonary status:  She at a higher risk of pulmonary disease due to radiation     exposure despite controlling dose and also due to her heavy smoking history.      Her risk include pleural effusions and pulmonary fibrosis.            RISK REDUCTION:             -Lifestyle risk assessment: Describes self as active with household chores, no     routine physical activity. She does endorse that she could eat better.  I     discussed interventions for such.               --Her biggest risk at this point is her smoking history.  Not only does this     increase her risk of lung cancer, but other cancers as well including     pancreatic, gallbladder, gastric, head and neck, kidney and bladder as well as     coronary artery disease and stroke.  She has cut down to 10 cigarettes per day.     Has used multiple modalities in past with several attempts to stop and has been     unsuccessful.  Gradual weaning seems to work for her.  Discussed triggers such     as coffee in the morning and riding in the car.  Discussed interventions to     decrease habits or to make her habit harder.  Encouraged calling [phone]     which will provide 1:1 counseling as well as offering of free nicotine     replacement products.   Reports her nerves and anxiety are a big culprit of her     smoking.  Wakes up nervous and anxious despite sleeping well at night.  Worries     about everything. Mind races, unable to relax unless it is to stop and get a     cigarette.  Does tend to affect relationships due to irritability.  Reports     depression controlled with Cymbalta and Abilify.  (>10 minutes discussing     smoking cessation)            -Cancer screening: Colonoscopy performed August 2015.  Few polyps noted.  EGD     performed as well. Indicated a poor prep.             -Pap smear every 3 - 5 years.              -Mammogram last done 2014. Has never had a bone density. Will set her up for     both.             -She is unsure if she has had screening for Hepatitis will defer to PCP.             -Vaccinations: Takes influenza vaccine yearly.  Unsure if tetanus is up to date.     Has had herpes zoster vaccination.  Unsure of pneumovax and prevnar. Encourage     discussion with PCP.            -Alcohol Use: NO alcohol use.              -Falls: Denies and no increase risk from therapy.               PSYCHOSOCIAL FUNCTIONING:            --Distress (thermometer 6) over enlarging RLL lung lesion.   Discussed short term    follow up imaging and reassured will follow closely.  She denies any financial     stressors. Social support discussed and she has a strong family support network.     Information given for local mental health providers and [employer].  If she decides she would like to talk to a counselor the number     for Mony Lomeli LCSW will be included in his packet.            -Advanced Directives. Does not have one, encouraged to go to website [email or web address]    for the [facility] Will Packet.            -Anxiety (CHESTER -7 (15)).  Will start low dose propranolol 10 mg BID, discussed     side effects.  May take extra tablet if needed, no more than 3 per day.  Will     follow up to discuss in 1 month.             -Depression (PHQ-9 (5)). Takes Cumbalta and Abilify.  No SI or HI.             -Encouraged her to call myself or Leatha for any concerns.            Mgmt of Disease and Treatment      Recommend:  Weight Bearing Exercises, Proof home, Removal of  loose rugs, Good     lighting      Discussed Limiting Alcohol:  No            Diagnosis      Notes      mammo      bone density      smoking cessation      propranolol 10 mg bid, script given       will follow up in one month to discuss anxiety and imaging.       New Medications      * DULoxetine (Cymbalta) Unknown Strength CAPSULE.DR: PO BID #120            Patient Education      Patient Education Provided:  Yes      Tobacco Cessation Counseling:  for over 10 minutes            Advanced Care Plan Discussion      Time Spent      11:00 - - 12:02            Electronically signed by CASTILLO SOL  01/06/2021 15:42       Disclaimer: Converted document may not contain table formatting or lab diagrams. Please see [employer] for the authenticated document.

## 2021-05-28 NOTE — PROGRESS NOTES
Patient: DARSHAN JOHN     Acct: [id number]     Report: #[id number]  UNIT #: [id number]     : 1960    Encounter Date:2021  PRIMARY CARE: Shanice Loo  ***Signed***  --------------------------------------------------------------------------------------------------------------------  Encounter Date:  2021      Nurse Navigator Intervention            Mammogram and bone density was scheduled for 21 at 0700 at the      [facility]. Patient notified and given the phone number to reschedule due to      not liking the time. Patient to call the office to schedule a follow up      for results of tests. Patient states understanding, has no further      questions. OLIVIA Roman RN            Electronically signed by CHARLENE ROMAN RN  2021 16:43       Disclaimer: Converted document may not contain table formatting or lab diagrams. Please see [employer] [facility] for the authenticated document.

## 2021-05-28 NOTE — PROGRESS NOTES
Patient: DARSHAN JOHN     Acct: [id number]     Report: #[id number]  UNIT #: [id number]     : 1960    Encounter Date:2021  PRIMARY CARE: Shanice Loo  ***Signed***  --------------------------------------------------------------------------------------------------------------------  Encounter Date:  2021      Care Plan Delivery Method      Patient Visit            Care Plan Delivered to PCP Via      Care Plan Delivered to PCP Via:  Mail            Referrals      Advanced Care Planning (living will pack given to patient), Other See Comment     (mammogram screening and bone density ordered)            Education Material Provided on      Care Plan, Diagnosis, Support Groups ([facility]), Survivorship            [employer] Acuity Tool      Acuity:  2      Diagnosis:        lung cancer      Stage:        Stage I            Nurse Navigator Intervention            Saad seen in clinic today has high anxiety due to being call about a      nodule in the right lung that will be watched. Patient reports that she      continues to smoke about a half pack per day. She reports a new right arm      pain with movement. She reports that she has decreased movement in her      right arm and does not remember injury. Right arm pain comes and goes      several times a day. Patient reports it feels like it is a pinched nerve.      A mammogram and a bone density was ordered. Afollow up appointment will be      given to patient to discuss results. Patient has no further questions      today. OLIVIA Roman RN            Time Spent Evaluating      28 (min)            Electronically signed by CHARLENE ROMAN RN  2021 12:40       Disclaimer: Converted document may not contain table formatting or lab diagrams. Please see [employer] for the authenticated document.

## 2021-05-28 NOTE — PROGRESS NOTES
Patient: DARSHAN JOHN     Acct: [id number]     Report: #[id number]  UNIT #: [id number]     : 1960    Encounter Date:2021  PRIMARY CARE: Shanice Loo  ***Signed***  --------------------------------------------------------------------------------------------------------------------  Encounter Date:  2021      Nurse Navigator Intervention            Patient called to report she rescheduled her mammogram and bone density to      5/3/21 at 1600. She is also rescheduling her survivorship appointment to      21 at 1100. OLIVIA Roman RN            Electronically signed by CHARLENE ROMAN RN  2021 12:10       Disclaimer: Converted document may not contain table formatting or lab diagrams. Please see [employer] for the authenticated document.

## 2021-05-28 NOTE — PROGRESS NOTES
Patient: GABRIELLA JOHN     Acct: [id number]     Report: #[id number]  UNIT #: [id number]     : 1960    Encounter Date:2020  PRIMARY CARE: Shanice Loo  ***Signed***  --------------------------------------------------------------------------------------------------------------------  TELEHEALTH NOTE      History of Present Illness            Chief Complaint: PET f/u            Gabriella John is presenting for evaluation via Telehealth visit by phone.     Verbal consent obtained before beginning visit.            Provider spent 21 minutes with the patient during telehealth visit.            The following staff were present during the visit: Palma Vitale MA, Africa Smith DO            This is a 61 year old current everyday smoker of cigarettes who established care    with me during hospitalization for a COPD exacerbation and followed up with     KELLEN Means in transition of care. She was hospitalized  through     .  During her hospitalization, she underwent a chest CT which showed an     incidental finding of a left upper lobe solitary pulmonary nodule measuring 1.8     cm.  Danae ordered a follow up PET scan and the patient had that done on     2020. The PET scan did show hypermetabolic uptake with an SUV max of 7.     There was no mediastinal hilar adenopathy and no other suspicious masses or     lesions within the skeleton or abdominal/pelvis. The patient states she has cut     down from smoking almost two packs per day down to one half pack per day with     nicotine gum and patches. She notices that she forgets to put on the patch most     days, but when she does remember to put it own it works quite well.  She had     requested to be switched from nebulizers over to inhalers and has been on     Symbicort and Spiriva for the last couple of weeks. She thinks they are working     quite well for her.  She has no residual cough, chest tightness, wheeze or     shortness of  breath.  She is not having to use her rescue inhaler on a daily     basis. She is using her supplemental oxygen at all times.  She denies night     sweats, fevers or chills.            I reviewed PET scan from 2020, also reviewed chest CT from 2020.  I     reviewed her pulmonary function testing performed 2020.  FEV1 was 30% of     predicted, this was postbronchodilator.  She had airtrapping hyperinflation and     DLCO was 33%.                       [facility]                PACS RADIOLOGY REPORT            Patient: DARSHAN JOHN   Acct: #[id number]   Report: #[id number]            UNIT #: [id number]    DOS: 20 1136      INSURANCE:BLUE ACCESS NETWORK - [facility]   ORDER #:[id number]      LOCATION:PET     : 1960            PROVIDERS      ADMITTING:     ATTENDING: Rosalina Palacio PA-C      FAMILY:  Shanice Loo   ORDERING:  Rosalina Palacio PA-C         OTHER:    DICTATING:  Rajesh Arango MD            REQ #:[id number]   EXAM:ISKBSMIDTH - SKULL BASE-MIDTHIGH INIT fdg      REASON FOR EXAM:  R91.1      REASON FOR VISIT:  R91.1            *******Signed******         PROCEDURE:   PET CT SKULL BASE TO MID THIGH INITIAL             COMPARISON:   [facility], CT, CHEST W/O CONTRAST, 2020, 8:58.             INDICATIONS:   SPN             TECHNIQUE:   F-18 FDG was administered intravenously.  A PET scan with     concurrent CT imaging was       performed from the skull to the thigh with multiplanar imaging.             RADIONUCLIDE:     11.48 MCI   F18 FDG- I.V.      LABS:                          Blood Glucose 91 mg/dl      UPTAKE TIME:        60 mins      BACKGROUND UPTAKE:  1.8 SUV             FINDINGS:         Physiologic uptake is seen in the head and neck.             The 1.8 cm x 1.5 cm left upper lobe nodule seen on recent CT scan is     hypermetabolic.  SUV max 7.             No mediastinal or hilar  adenopathy is evident.             No abnormal uptake is seen in the abdomen and pelvis.             No abnormal skeletal uptake is evident.             CONCLUSION:         PET-CT demonstrating 1.8 cm hypermetabolic nodule, left upper lobe suspicious     for bronchogenic       malignancy or lung cancer.                LAUREN ALEGRE MD             Electronically Signed and Approved By: LAUREN ALEGRE MD on 5/14/2020 at 13:22                           Until signed, this is an unconfirmed preliminary report that may contain      errors and is subject to change.                                              COUST:      D:05/14/20 1322                         Past Med History      Copd      14yo, 1pdd still currently smoking .5 ppd      Flu- Not current      Pneumonia-Not current      Overview of Symptoms      Pt complains of nothing at this time            Allergies/Medications      Allergies:        Coded Allergies:             NO KNOWN ALLERGIES (Unverified , 5/19/20)      Medications    Last Reconciled on 5/19/20 13:41 by ARABELLA BUSTILLO,       Budesonide/Formoterol Fumarate (Symbicort 160/4.5 Mcg) 10.2 Gm Inh      2 PUFF INH BID, #1 INH 5 Refills         Prov: Rosalina Palacio PA-C         5/6/20       Tiotropium Bromide (Spiriva Respimat 2.5 mcg/Puff) 4 Gm Mist.inhal      2 PUFFS INH RTQDAY, #1 MDI 5 Refills         Prov: Rosalina Palacio PA-C         5/6/20       Nicotine 14 Mg Patch (Nicotine 14 Mg Patch) 1 Each Patch.td24      14 MG TRANSDERM QDAY for 30 Days, PATCH         Prov: ANH WALSH         4/30/20       Carvedilol (Carvedilol) 6.25 Mg Tablet      6.25 MG PO BID for 30 Days, #60 TAB         Prov: ROBLES WALSHC         4/30/20       Furosemide (Furosemide) 20 Mg Tablet      20 MG PO QDAY for 30 Days, #30 TAB         Prov: ANH WALSH         4/30/20       Albuterol Sulfate (Albuterol Sulfate) 1.25 Mg/3 Ml Vial.neb      1.25 MG INH Q6H PRN for SHORTNESS OF BREATH, #120 NEB 3 Refills          Prov: ARABELLA BUSTILLO         4/29/20       Venlafaxine XR (Effexor XR) 150 Mg Cap.er.24h      150 MG PO QDAY         Reported         4/26/20       ARIPiprazole (ABILIFY) 5 Mg Tab      5 MG PO QDAY         Reported         4/26/20       Gabapentin (Gabapentin) 400 Mg Capsule      400 MG PO TID         Reported         4/26/20       Cyanocobalamin (Vitamin B-12) 5,000 Mcg Tab.subl      5000 MCG SL QDAY, TAB         Reported         4/26/20       Multivitamins (Multi-Vitamin) 1 Each Tablet      1 TAB PO QDAY, #30 TAB 0 Refills         Reported         4/26/20       Ascorbic Acid (Ascorbic Acid) 250 Mg Tablet      250 MG PO QDAY, #30 TAB         Reported         4/26/20       Clopidogrel Bisulfate (Plavix) 75 Mg Tablet      75 MG PO HS, #30 TAB         Prov: Shanice Loo         6/20/19       Aspirin Chew (Aspirin Baby) 81 Mg Tab.chew      81 MG PO QDAY, #30 TAB.CHEW 0 Refills         Prov: Shanice Loo         6/20/19       MDI-Albuterol (Proair HFA) 60 Puffs/Inh Puffs      1 PUFFS INH PRN for SHORTNESS OF BREATH, INH         Reported         12/19/13            Plan/Instructions      Ambulatory Assessment/Plan:        Lung nodule, solitary - R91.1            COPD (chronic obstructive pulmonary disease) - J44.9            Smoking - F17.200            Notes      New Office Procedures      * Follow Up Appointment, 2 Months         Dx: COPD (chronic obstructive pulmonary disease) - J44.9      New Referrals      * Radiation Therapy, As Soon As Possible         MAYELA CALABRESE with RADIATION ONCOLOGY         Dx: Lung nodule, solitary - R91.1      Plan/Instructions            * Plan Of Care: ()            * Chronic conditions reviewed and taken into consideration for today's treatment       plan.      * Patient instructed to seek medical attention urgently for new or worsening       symptoms.      * Patient was educated/instructed on their diagnosis, treatment and medications       prior to discharge from the clinic today.             ASSESSMENT:       1. Severe COPD without acute exacerbation.      2.  PET avid left upper lobe solitary pulmonary nodule measuring 1.8 cm, highly     suspicious for bronchogenic carcinoma.      3. Ongoing tobacco abuse with cigarettes.            PLAN:      1.  I spent four minutes today counseling the patient on smoking cessation     efforts. We did speak about possibly trying Chantix, but when given the side     effects of suicidal/homicidal ideation, the patient states she would rather not     try this medication because she already suffers from depression and is well     maintained on Effexor.  She states she will try the nicotine patches on a daily     basis and continue to try to cut down on her smoking.      2. I have called and spoke to Dr. Yee regarding possible wedge resection of     the left upper lobe nodule for pathologic diagnosis as well as treatment.     Unfortunately, this patient is not a surgical candidate based on her pulmonary     function testing.      3.  Dr. Yee and I have decided the best course of action would likely be for     SBRT to the nodule. I have referred the patient over to Dr. Kurtz for his     evaluation and treatment.      4. Continue inhalers as prescribed.      5. Continue oxygen.      6.  The patient will need a flu shot and I will go ahead and give her a     pneumonia shot at her next visit.      7. Follow up with me in two months time.      Codes:  Phone Eval 21-30 mi [id number]            Electronically signed by ARABELLA BUSTILLO  06/09/2020 11:17       Disclaimer: Converted document may not contain table formatting or lab diagrams. Please see [facility] for the authenticated document.

## 2021-11-03 DIAGNOSIS — C34.92 MALIGNANT NEOPLASM OF LEFT LUNG, UNSPECIFIED PART OF LUNG (HCC): Primary | ICD-10-CM

## 2021-11-10 ENCOUNTER — APPOINTMENT (OUTPATIENT)
Dept: CT IMAGING | Facility: HOSPITAL | Age: 61
End: 2021-11-10

## 2021-11-15 ENCOUNTER — HOSPITAL ENCOUNTER (OUTPATIENT)
Dept: CT IMAGING | Facility: HOSPITAL | Age: 61
Discharge: HOME | End: 2021-11-15
Admitting: RADIOLOGY

## 2021-11-15 DIAGNOSIS — C34.92 MALIGNANT NEOPLASM OF LEFT LUNG, UNSPECIFIED PART OF LUNG (HCC): ICD-10-CM

## 2021-11-15 PROCEDURE — 71250 CT THORAX DX C-: CPT

## 2021-11-18 ENCOUNTER — OFFICE VISIT (OUTPATIENT)
Dept: RADIATION ONCOLOGY | Facility: HOSPITAL | Age: 61
End: 2021-11-18

## 2021-11-18 VITALS
SYSTOLIC BLOOD PRESSURE: 137 MMHG | TEMPERATURE: 98 F | DIASTOLIC BLOOD PRESSURE: 78 MMHG | BODY MASS INDEX: 23.87 KG/M2 | WEIGHT: 126.32 LBS | OXYGEN SATURATION: 93 % | RESPIRATION RATE: 16 BRPM | HEART RATE: 91 BPM

## 2021-11-18 DIAGNOSIS — C34.12 MALIGNANT NEOPLASM OF UPPER LOBE OF LEFT LUNG (HCC): Primary | ICD-10-CM

## 2021-11-18 PROBLEM — Z78.0 POSTMENOPAUSAL STATE: Status: ACTIVE | Noted: 2018-01-29

## 2021-11-18 PROBLEM — I63.9 CEREBRAL INFARCTION: Status: ACTIVE | Noted: 2019-07-29

## 2021-11-18 PROBLEM — F32.9 MAJOR DEPRESSIVE DISORDER WITH SINGLE EPISODE: Status: ACTIVE | Noted: 2019-07-29

## 2021-11-18 PROBLEM — K52.3 INDETERMINATE COLITIS: Status: ACTIVE | Noted: 2018-01-29

## 2021-11-18 PROBLEM — K57.92 DIVERTICULITIS: Status: ACTIVE | Noted: 2018-01-29

## 2021-11-18 PROBLEM — A49.01 METHICILLIN SUSCEPTIBLE STAPHYLOCOCCUS AUREUS INFECTION: Status: ACTIVE | Noted: 2021-11-18

## 2021-11-18 PROBLEM — I50.30 DIASTOLIC CONGESTIVE HEART FAILURE (HCC): Status: ACTIVE | Noted: 2020-05-06

## 2021-11-18 PROBLEM — F32.A DEPRESSIVE DISORDER: Status: ACTIVE | Noted: 2018-01-29

## 2021-11-18 PROBLEM — F41.9 ANXIETY DISORDER: Status: ACTIVE | Noted: 2018-01-29

## 2021-11-18 PROBLEM — K46.9 ABDOMINAL HERNIA: Status: ACTIVE | Noted: 2021-11-18

## 2021-11-18 PROBLEM — I10 HYPERTENSION: Status: ACTIVE | Noted: 2018-01-29

## 2021-11-18 PROBLEM — M79.2 PERIPHERAL NEUROPATHIC PAIN: Status: ACTIVE | Noted: 2018-01-29

## 2021-11-18 PROBLEM — G60.8 OTHER HEREDITARY AND IDIOPATHIC NEUROPATHIES: Status: ACTIVE | Noted: 2019-07-29

## 2021-11-18 PROBLEM — E78.5 HYPERLIPIDEMIA: Status: ACTIVE | Noted: 2018-01-29

## 2021-11-18 PROBLEM — R51.9 HEADACHE: Status: ACTIVE | Noted: 2018-01-29

## 2021-11-18 PROBLEM — IMO0001 REQUESTS HORMONE REPLACEMENT THERAPY: Status: ACTIVE | Noted: 2021-11-18

## 2021-11-18 PROBLEM — G44.221 CHRONIC TENSION-TYPE HEADACHE, INTRACTABLE: Status: ACTIVE | Noted: 2020-02-26

## 2021-11-18 PROCEDURE — 99213 OFFICE O/P EST LOW 20 MIN: CPT | Performed by: RADIOLOGY

## 2021-11-18 RX ORDER — GABAPENTIN 400 MG/1
400 CAPSULE ORAL 3 TIMES DAILY
COMMUNITY
Start: 2021-11-17

## 2021-11-18 RX ORDER — ARIPIPRAZOLE 30 MG/1
30 TABLET ORAL NIGHTLY
COMMUNITY
Start: 2021-09-10

## 2021-11-18 RX ORDER — ERGOCALCIFEROL 1.25 MG/1
CAPSULE ORAL
COMMUNITY
End: 2022-09-26 | Stop reason: SDUPTHER

## 2021-11-18 RX ORDER — BUTALBITAL, ACETAMINOPHEN, CAFFEINE AND CODEINE PHOSPHATE 50; 325; 40; 30 MG/1; MG/1; MG/1; MG/1
CAPSULE ORAL EVERY 4 HOURS
COMMUNITY
End: 2022-08-29

## 2021-11-18 RX ORDER — ATORVASTATIN CALCIUM 20 MG/1
TABLET, FILM COATED ORAL
COMMUNITY

## 2021-11-18 RX ORDER — LOSARTAN POTASSIUM AND HYDROCHLOROTHIAZIDE 25; 100 MG/1; MG/1
TABLET ORAL
COMMUNITY
Start: 2021-09-03

## 2021-11-18 RX ORDER — OMEPRAZOLE 20 MG/1
CAPSULE, DELAYED RELEASE ORAL
COMMUNITY
End: 2021-11-18

## 2021-11-18 RX ORDER — RANITIDINE 300 MG/1
TABLET ORAL
COMMUNITY
End: 2021-11-18

## 2021-11-18 RX ORDER — VENLAFAXINE HYDROCHLORIDE 150 MG/1
150 CAPSULE, EXTENDED RELEASE ORAL DAILY
COMMUNITY
Start: 2021-09-03

## 2021-11-18 RX ORDER — CLOPIDOGREL BISULFATE 75 MG/1
75 TABLET ORAL DAILY
COMMUNITY
Start: 2021-08-24

## 2021-11-18 RX ORDER — SERTRALINE HYDROCHLORIDE 100 MG/1
TABLET, FILM COATED ORAL
COMMUNITY
End: 2021-11-18

## 2021-11-18 RX ORDER — LORAZEPAM 0.5 MG/1
TABLET ORAL
COMMUNITY
Start: 2021-09-17 | End: 2022-08-29

## 2021-11-18 RX ORDER — ROSUVASTATIN CALCIUM 10 MG/1
TABLET, COATED ORAL
COMMUNITY
Start: 2021-09-03 | End: 2021-11-18

## 2021-11-18 NOTE — PROGRESS NOTES
Follow Up Office Visit      Encounter Date: 11/18/2021   Patient Name: Gabriella Jha  YOB: 1960   Medical Record Number: [id number]   Primary Diagnosis: Malignant neoplasm of upper lobe of left lung (HCC) [C34.12]     Completion Date:  7/1/2020    Chief Complaint:    Chief Complaint   Patient presents with   • Follow-up   • Lung Cancer       History of Present Illness: Gabriella Jha returns to radiation oncology for routine scheduled follow-up after undergoing CT scan of the chest without contrast.  She reports feeling well overall with no complaints.  She has no changes in breathing.  She denies headaches, vision changes, chest pain, focal weakness or hemoptysis.  She does have a cough productive of white sputum; this is chronic.  She continues to smoke approximately 1/3 pack/day.  CT scan of the chest performed on 11/15/2021 reveals mildly enlarged lymph node in the left side of the superior mediastinum that is unchanged since 5/4/2021 and no evidence of mass within the left lung.  There is no comment regarding the treated lesion within the left upper lobe.      Subjective      Review of Systems: Review of Systems   Constitutional: Positive for fatigue.   Respiratory: Positive for cough. Negative for shortness of breath.    Gastrointestinal: Negative for constipation, diarrhea and nausea.   Genitourinary: Negative for dysuria, frequency and urgency.   Skin: Negative for color change.   Neurological: Negative for dizziness and headaches.       The following portions of the patient's history were reviewed and updated as appropriate: allergies, current medications, past family history, past medical history, past social history, past surgical history and problem list.    Medications:     Current Outpatient Medications:   •  ARIPiprazole (ABILIFY) 30 MG tablet, , Disp: , Rfl:   •  atorvastatin (LIPITOR) 20 MG tablet, atorvastatin 20 mg tablet  Take 1 tablet every day by oral route., Disp: , Rfl:    •  clopidogrel (PLAVIX) 75 MG tablet, , Disp: , Rfl:   •  ergocalciferol (ERGOCALCIFEROL) 1.25 MG (99841 UT) capsule, Vitamin D2 1,250 mcg (50,000 unit) capsule  Take 1 capsule every week by oral route., Disp: , Rfl:   •  gabapentin (NEURONTIN) 400 MG capsule, , Disp: , Rfl:   •  LORazepam (ATIVAN) 0.5 MG tablet, , Disp: , Rfl:   •  losartan-hydrochlorothiazide (HYZAAR) 100-25 MG per tablet, , Disp: , Rfl:   •  venlafaxine XR (EFFEXOR-XR) 150 MG 24 hr capsule, , Disp: , Rfl:   •  butalbital-acetaminophen-caffeine-codeine (FIORICET WITH CODEINE) -11-30 MG per capsule, Every 4 (Four) Hours., Disp: , Rfl:     Allergies:   Allergies   Allergen Reactions   • Clindamycin Hcl GI Intolerance       ECOG: (0) Fully active, able to carry on all predisease performance without restriction  Quality of Life: 100 - Full Activity     Objective     Physical Exam:   Vital Signs:   Vitals:    11/18/21 1348   BP: 137/78   Pulse: 91   Resp: 16   Temp: 98 °F (36.7 °C)   TempSrc: Temporal   SpO2: 93%   Weight: 57.3 kg (126 lb 5.2 oz)   PainSc: 0-No pain     Body mass index is 23.87 kg/m².     Physical Exam  Constitutional:       Appearance: Normal appearance. She is normal weight.   HENT:      Head: Normocephalic and atraumatic.      Mouth/Throat:      Mouth: Mucous membranes are moist.      Pharynx: Oropharynx is clear.   Cardiovascular:      Rate and Rhythm: Normal rate and regular rhythm.      Heart sounds: No murmur heard.  No friction rub. No gallop.    Pulmonary:      Effort: Pulmonary effort is normal. No respiratory distress.      Breath sounds: No stridor. No wheezing, rhonchi or rales.      Comments: Decreased bilaterally  Abdominal:      Palpations: Abdomen is soft.   Skin:     General: Skin is warm and dry.      Coloration: Skin is not jaundiced.   Neurological:      General: No focal deficit present.      Mental Status: She is alert and oriented to person, place, and time.      Cranial Nerves: No cranial nerve deficit.       Gait: Gait normal.   Psychiatric:         Mood and Affect: Mood normal.         Behavior: Behavior normal.         Radiographs: I personally reviewed the CT scan of the chest without contrast performed on 11/15/2021.  In addition to the findings above there is interval decrease in size in the left upper lobe lesion previously measured as approximately 7 mm in greatest dimension now measuring 5 mm in greatest dimension.    Labs:     Assessment / Plan          Assessment/Plan:   Gabriella Jha is a 61-year-old female with a left upper lobe nodule presumed primary lung malignancy.  She is status post stereotactic body radiotherapy, completing 55 Gray in 5 fractions as of 7/1/2020.  She has an excellent radiographic response and is clinically stable.  She does have a persistent left mediastinum lymph node that is unchanged.    I discussed the findings of the recent CT scan of the chest with Ms. Jha.  I recommended follow-up CT in 6 months.  She was encouraged to contact me in the interim with any questions or concerns regarding her care.        Carl Kurtz MD  Radiation Oncology  [facility]    This document has been signed by Carl Kurtz MD on November 19, 2021 07:57 EST

## 2022-03-01 DIAGNOSIS — C34.12 MALIGNANT NEOPLASM OF UPPER LOBE OF LEFT LUNG: Primary | ICD-10-CM

## 2022-05-10 ENCOUNTER — APPOINTMENT (OUTPATIENT)
Dept: CT IMAGING | Facility: HOSPITAL | Age: 62
End: 2022-05-10

## 2022-05-13 ENCOUNTER — HOSPITAL ENCOUNTER (OUTPATIENT)
Dept: CT IMAGING | Facility: HOSPITAL | Age: 62
Discharge: HOME | End: 2022-05-13
Admitting: RADIOLOGY

## 2022-05-13 DIAGNOSIS — C34.12 MALIGNANT NEOPLASM OF UPPER LOBE OF LEFT LUNG: ICD-10-CM

## 2022-05-13 PROCEDURE — 71250 CT THORAX DX C-: CPT

## 2022-05-19 ENCOUNTER — APPOINTMENT (OUTPATIENT)
Dept: RADIATION ONCOLOGY | Facility: HOSPITAL | Age: 62
End: 2022-05-19

## 2022-05-27 ENCOUNTER — OFFICE VISIT (OUTPATIENT)
Dept: RADIATION ONCOLOGY | Facility: HOSPITAL | Age: 62
End: 2022-05-27

## 2022-05-27 VITALS
RESPIRATION RATE: 16 BRPM | WEIGHT: 127.43 LBS | DIASTOLIC BLOOD PRESSURE: 66 MMHG | TEMPERATURE: 98.1 F | HEART RATE: 68 BPM | OXYGEN SATURATION: 95 % | BODY MASS INDEX: 24.08 KG/M2 | SYSTOLIC BLOOD PRESSURE: 102 MMHG

## 2022-05-27 DIAGNOSIS — C34.12 MALIGNANT NEOPLASM OF UPPER LOBE OF LEFT LUNG: Primary | ICD-10-CM

## 2022-05-27 PROBLEM — C34.90 LUNG CANCER: Status: ACTIVE | Noted: 2022-05-27

## 2022-05-27 PROCEDURE — 99214 OFFICE O/P EST MOD 30 MIN: CPT | Performed by: RADIOLOGY

## 2022-05-27 PROCEDURE — G0463 HOSPITAL OUTPT CLINIC VISIT: HCPCS | Performed by: RADIOLOGY

## 2022-05-27 RX ORDER — CLONAZEPAM 1 MG/1
1 TABLET ORAL 2 TIMES DAILY PRN
COMMUNITY

## 2022-05-27 NOTE — PROGRESS NOTES
Follow Up Office Visit      Encounter Date: 05/27/2022   Patient Name: Gabriella Jha  YOB: 1960   Medical Record Number: [id number]   Primary Diagnosis: Malignant neoplasm of upper lobe of left lung (HCC) [C34.12]       Completion Date:  7/1/2020    Chief Complaint:    Chief Complaint   Patient presents with   • Follow-up   • Lung Cancer       History of Present Illness: Gabriella Jha returns for routine scheduled follow-up.  She reports feeling well overall with no complaints other than chronic headaches.  She denies focal weakness, vision changes, imbalance or confusion.  CT scan of the chest without contrast on 5/13/2022 revealed a new 5 mm noncalcified nodular density in the right upper lobe.    Subjective      Review of Systems: Review of Systems   Constitutional: Positive for fatigue (3/10). Negative for appetite change.   HENT: Negative for sore throat, tinnitus, trouble swallowing and voice change.    Respiratory: Positive for cough (Productive with clear secretions). Negative for shortness of breath and wheezing.    Cardiovascular: Negative for chest pain, palpitations and leg swelling.   Gastrointestinal: Negative for constipation, diarrhea and nausea.   Genitourinary: Negative for difficulty urinating, dysuria, frequency and urgency.   Musculoskeletal: Negative for arthralgias and back pain.   Skin: Negative for color change and rash.   Neurological: Positive for headaches (Occasional tension headaches, relieved with bc powder). Negative for dizziness.   Psychiatric/Behavioral: Negative for sleep disturbance.       The following portions of the patient's history were reviewed and updated as appropriate: allergies, current medications, past family history, past medical history, past social history, past surgical history and problem list.    Medications:     Current Outpatient Medications:   •  ARIPiprazole (ABILIFY) 30 MG tablet, , Disp: , Rfl:   •  atorvastatin (LIPITOR) 20 MG  tablet, atorvastatin 20 mg tablet  Take 1 tablet every day by oral route., Disp: , Rfl:   •  clopidogrel (PLAVIX) 75 MG tablet, , Disp: , Rfl:   •  ergocalciferol (ERGOCALCIFEROL) 1.25 MG (05206 UT) capsule, Vitamin D2 1,250 mcg (50,000 unit) capsule  Take 1 capsule every week by oral route., Disp: , Rfl:   •  gabapentin (NEURONTIN) 400 MG capsule, , Disp: , Rfl:   •  losartan-hydrochlorothiazide (HYZAAR) 100-25 MG per tablet, , Disp: , Rfl:   •  venlafaxine XR (EFFEXOR-XR) 150 MG 24 hr capsule, , Disp: , Rfl:   •  butalbital-acetaminophen-caffeine-codeine (FIORICET WITH CODEINE) -71-30 MG per capsule, Every 4 (Four) Hours., Disp: , Rfl:   •  clonazePAM (KlonoPIN) 1 MG tablet, Take 1 mg by mouth 2 (Two) Times a Day As Needed., Disp: , Rfl:   •  LORazepam (ATIVAN) 0.5 MG tablet, , Disp: , Rfl:     Allergies:   Allergies   Allergen Reactions   • Clindamycin Hcl GI Intolerance       ECOG: (0) Fully active, able to carry on all predisease performance without restriction  Quality of Life: 100 - Full Activity     Objective     Physical Exam:   Vital Signs:   Vitals:    05/27/22 1306   BP: 102/66   Pulse: 68   Resp: 16   Temp: 98.1 °F (36.7 °C)   TempSrc: Temporal   SpO2: 95%   Weight: 57.8 kg (127 lb 6.8 oz)   PainSc: 0-No pain     Body mass index is 24.08 kg/m².     Physical Exam  Constitutional:       General: She is not in acute distress.     Appearance: Normal appearance. She is not ill-appearing or toxic-appearing.   HENT:      Head: Normocephalic and atraumatic.      Mouth/Throat:      Mouth: Mucous membranes are moist.      Pharynx: Oropharynx is clear.   Pulmonary:      Effort: Pulmonary effort is normal. No respiratory distress.      Breath sounds: No stridor. Rhonchi present. No rales.   Abdominal:      General: There is no distension.      Palpations: Abdomen is soft.   Skin:     General: Skin is warm.      Coloration: Skin is not jaundiced.      Findings: No lesion.   Neurological:      General: No focal  deficit present.      Mental Status: She is alert and oriented to person, place, and time.      Cranial Nerves: No cranial nerve deficit.      Gait: Gait normal.   Psychiatric:         Mood and Affect: Mood normal.         Behavior: Behavior normal.         Judgment: Judgment normal.         Radiographs: CT Chest Without Contrast Diagnostic    Result Date: 5/14/2022    1. New 5 mm in size noncalcified nodular density right upper lobe lung of uncertain etiology might represent granuloma or mass.  I recommend thoracic surgical consultation to further evaluate this abnormality. 2. Mild focal infiltrate left upper lobe lung more prominent than on previous study might represent inflammation from radiation therapy and less likely infection     LETICIA LENTZ MD       Electronically Signed and Approved By: LETICIA LENTZ MD on 5/14/2022 at 7:52           I personally reviewed the CT scan of the chest without contrast from 5/13/2022.  In addition to the findings above there is a 0.5 cm right upper lobe nodule adjacent to the cardiac border that is not mentioned in the report.  There is resolution of the once appreciated left upper lobe nodule after SBRT.      Assessment / Plan          Assessment/Plan:   Gabriella Jha is a 61-year-old female with a left upper lobe nodule presumed primary lung malignancy.  She is status post stereotactic body radiotherapy, completing 55 Gray in 5 fractions as of 7/1/2020.  She has an excellent radiographic response and is clinically stable.  She does have a persistent left mediastinum lymph node that is unchanged.  He additionally has a right upper lobe nodule adjacent to the cardiac border that is unchanged.  She has a new right upper lobe nodule measuring 0.5 cm.  ECOG 0    I discussed the findings of the recent CT scan of the chest with Ms. Jha.  I explained that the new nodule within the right upper lobe may represent a granuloma or developing primary lung malignancy.  I  recommended repeat CT scan of the chest in 3 months.  Ms. Jha was encouraged to contact me in the interim with any questions or concerns regarding her care.        Carl Kurtz MD  Radiation Oncology  [facility]    This document has been signed by Carl Kurtz MD on May 27, 2022 14:18 EDT

## 2022-08-24 ENCOUNTER — APPOINTMENT (OUTPATIENT)
Dept: CT IMAGING | Facility: HOSPITAL | Age: 62
End: 2022-08-24

## 2022-08-24 ENCOUNTER — HOSPITAL ENCOUNTER (OUTPATIENT)
Dept: CT IMAGING | Facility: HOSPITAL | Age: 62
Discharge: HOME | End: 2022-08-24
Admitting: RADIOLOGY

## 2022-08-24 DIAGNOSIS — C34.12 MALIGNANT NEOPLASM OF UPPER LOBE OF LEFT LUNG: ICD-10-CM

## 2022-08-24 PROCEDURE — 71250 CT THORAX DX C-: CPT

## 2022-08-29 ENCOUNTER — OFFICE VISIT (OUTPATIENT)
Dept: RADIATION ONCOLOGY | Facility: HOSPITAL | Age: 62
End: 2022-08-29

## 2022-08-29 VITALS
TEMPERATURE: 97 F | HEART RATE: 93 BPM | WEIGHT: 127.87 LBS | RESPIRATION RATE: 16 BRPM | OXYGEN SATURATION: 93 % | BODY MASS INDEX: 24.16 KG/M2 | DIASTOLIC BLOOD PRESSURE: 77 MMHG | SYSTOLIC BLOOD PRESSURE: 116 MMHG

## 2022-08-29 DIAGNOSIS — C34.12 MALIGNANT NEOPLASM OF UPPER LOBE OF LEFT LUNG: Primary | ICD-10-CM

## 2022-08-29 PROCEDURE — G0463 HOSPITAL OUTPT CLINIC VISIT: HCPCS | Performed by: RADIOLOGY

## 2022-08-29 PROCEDURE — 99214 OFFICE O/P EST MOD 30 MIN: CPT | Performed by: RADIOLOGY

## 2022-08-29 RX ORDER — ROSUVASTATIN CALCIUM 10 MG/1
10 TABLET, COATED ORAL
COMMUNITY
Start: 2022-06-23

## 2022-08-29 RX ORDER — CHOLECALCIFEROL (VITAMIN D3) 1250 MCG
50000 CAPSULE ORAL
COMMUNITY
Start: 2022-06-28

## 2022-08-29 NOTE — PROGRESS NOTES
Follow Up Office Visit      Encounter Date: 08/29/2022   Patient Name: Gabriella Jha  YOB: 1960   Medical Record Number: [id number]   Primary Diagnosis: Malignant neoplasm of upper lobe of left lung (HCC) [C34.12]     Completion Date:  7/1/2020    Chief Complaint:    Chief Complaint   Patient presents with   • Follow-up   • Lung Cancer       History of Present Illness: Gabriella Jha returns for routine scheduled follow-up.  She reports feeling well overall with the exception of persistent fatigue.  She denies headaches, vision changes, focal weakness, confusion or imbalance.  She denies any weight loss, chest pain, cough or hemoptysis.  CT scan of the chest from 8/24/2022 revealed enlargement of a right upper lobe nodule which now measures 0.9 cm in greatest dimension, previously measuring 0.5 cm in greatest dimension.  Additionally, there is a left upper lobe nodule that is new, measuring 5 mm in greatest dimension.    Subjective      Review of Systems: Review of Systems   Constitutional: Positive for fatigue. Negative for appetite change.   Respiratory: Negative for cough and shortness of breath.    Gastrointestinal: Negative for constipation, diarrhea and nausea.   Genitourinary: Negative for difficulty urinating, dysuria, frequency and urgency.   Musculoskeletal: Positive for arthralgias. Negative for back pain.   Skin: Negative for rash.   Neurological: Negative for dizziness and headaches.   Psychiatric/Behavioral: Negative for sleep disturbance.       The following portions of the patient's history were reviewed and updated as appropriate: allergies, current medications, past family history, past medical history, past social history, past surgical history and problem list.    Medications:     Current Outpatient Medications:   •  ARIPiprazole (ABILIFY) 30 MG tablet, , Disp: , Rfl:   •  Cholecalciferol (Vitamin D3) 1.25 MG (88128 UT) capsule, Take 50,000 Units by mouth Every 7 (Seven)  Days.  , Disp: , Rfl:   •  clonazePAM (KlonoPIN) 1 MG tablet, Take 1 mg by mouth 2 (Two) Times a Day As Needed., Disp: , Rfl:   •  clopidogrel (PLAVIX) 75 MG tablet, , Disp: , Rfl:   •  ergocalciferol (ERGOCALCIFEROL) 1.25 MG (67450 UT) capsule, Vitamin D2 1,250 mcg (50,000 unit) capsule  Take 1 capsule every week by oral route., Disp: , Rfl:   •  gabapentin (NEURONTIN) 400 MG capsule, , Disp: , Rfl:   •  losartan-hydrochlorothiazide (HYZAAR) 100-25 MG per tablet, , Disp: , Rfl:   •  venlafaxine XR (EFFEXOR-XR) 150 MG 24 hr capsule, , Disp: , Rfl:   •  atorvastatin (LIPITOR) 20 MG tablet, atorvastatin 20 mg tablet  Take 1 tablet every day by oral route., Disp: , Rfl:   •  rosuvastatin (CRESTOR) 10 MG tablet, Take 10 mg by mouth every night at bedtime., Disp: , Rfl:     Allergies:   Allergies   Allergen Reactions   • Clindamycin Hcl GI Intolerance       ECOG: (0) Fully active, able to carry on all predisease performance without restriction  Quality of Life: 100 - Full Activity     Objective     Physical Exam:   Vital Signs:   Vitals:    08/29/22 1303   BP: 116/77   Pulse: 93   Resp: 16   Temp: 97 °F (36.1 °C)   TempSrc: Temporal   SpO2: 93%   Weight: 58 kg (127 lb 13.9 oz)   PainSc: 0-No pain     Body mass index is 24.16 kg/m².     Physical Exam  Constitutional:       General: She is not in acute distress.     Appearance: Normal appearance. She is normal weight. She is not ill-appearing or toxic-appearing.   HENT:      Head: Normocephalic and atraumatic.      Nose: Nose normal.      Mouth/Throat:      Mouth: Mucous membranes are moist.   Pulmonary:      Effort: Pulmonary effort is normal. No respiratory distress.   Abdominal:      General: There is no distension.      Palpations: Abdomen is soft.   Skin:     General: Skin is warm and dry.   Neurological:      General: No focal deficit present.      Mental Status: She is alert.      Cranial Nerves: No cranial nerve deficit.      Gait: Gait normal.   Psychiatric:          Mood and Affect: Mood normal.         Judgment: Judgment normal.         Radiographs: CT Chest Without Contrast Diagnostic    Result Date: 8/25/2022   Enlarging nodule in the right upper lobe and new nodule in the left upper lobe concerning for metastatic disease.  There is stable left perihilar treatment related fibrosis, and there is a stable prevascular mediastinal lymph node and right middle lobe nodule     EDY WOODY MD       Electronically Signed and Approved By: EDY WOODY MD on 8/25/2022 at 8:37              I personally reviewed the CT scan of the chest from 8/25/2022.  The pertinent findings are as above in HPI.    Assessment / Plan          Assessment/Plan:   Gabriella Jha is a 62-year-old female with a left upper lobe nodule presumed primary lung malignancy.  She is status post stereotactic body radiotherapy, completing 55 Gray in 5 fractions as of 7/1/2020.  She has an excellent radiographic response and is clinically stable.  She has enlargement of a right upper lobe nodule as well as development of a new left upper lobe nodule concerning for new primary lung malignancy. ECOG 0    I discussed the findings of the CT scan of the chest from 8/24/2022.  I explained that there is been progression of a right upper lobe nodule and development of a new left upper lobe nodule.  I recommended PET/CT for further characterization of these findings as well as to determine if there are any additional areas concerning for new or metastatic disease.  I additionally ordered an MRI of the brain with and without contrast.  Ms. Jha will undergo the studies and return for evaluation.  Should she exhibit only FDG avidity within the chest and there is no evidence of distant metastatic disease, I will recommend stereotactic body radiotherapy to the right upper lobe nodule.  With potential treatment of the left upper lobe nodule versus a short interval scan.    Carl Kurtz MD  Radiation Oncology  [facility]  [facility]    This document has been signed by Carl Kurtz MD on August 29, 2022 13:53 EDT

## 2022-09-20 ENCOUNTER — HOSPITAL ENCOUNTER (OUTPATIENT)
Dept: PET IMAGING | Facility: HOSPITAL | Age: 62
Discharge: HOME | End: 2022-09-20

## 2022-09-20 DIAGNOSIS — C34.12 MALIGNANT NEOPLASM OF UPPER LOBE OF LEFT LUNG: ICD-10-CM

## 2022-09-20 PROCEDURE — 0 FLUDEOXYGLUCOSE F18 SOLUTION: Performed by: RADIOLOGY

## 2022-09-20 PROCEDURE — 78815 PET IMAGE W/CT SKULL-THIGH: CPT

## 2022-09-20 PROCEDURE — A9552 F18 FDG: HCPCS | Performed by: RADIOLOGY

## 2022-09-20 RX ADMIN — FLUDEOXYGLUCOSE F18 1 DOSE: 300 INJECTION INTRAVENOUS at 08:26

## 2022-09-21 ENCOUNTER — HOSPITAL ENCOUNTER (OUTPATIENT)
Dept: MRI IMAGING | Facility: HOSPITAL | Age: 62
Discharge: HOME | End: 2022-09-21
Admitting: RADIOLOGY

## 2022-09-21 DIAGNOSIS — C34.12 MALIGNANT NEOPLASM OF UPPER LOBE OF LEFT LUNG: ICD-10-CM

## 2022-09-21 LAB
CREAT BLDA-MCNC: 0.9 MG/DL
EGFRCR SERPLBLD CKD-EPI 2021: 72 ML/MIN/1.73

## 2022-09-21 PROCEDURE — A9577 INJ MULTIHANCE: HCPCS | Performed by: RADIOLOGY

## 2022-09-21 PROCEDURE — 0 GADOBENATE DIMEGLUMINE 529 MG/ML SOLUTION: Performed by: RADIOLOGY

## 2022-09-21 PROCEDURE — 70553 MRI BRAIN STEM W/O & W/DYE: CPT

## 2022-09-21 PROCEDURE — 82565 ASSAY OF CREATININE: CPT

## 2022-09-21 RX ADMIN — GADOBENATE DIMEGLUMINE 10 ML: 529 INJECTION, SOLUTION INTRAVENOUS at 07:10

## 2022-09-26 ENCOUNTER — OFFICE VISIT (OUTPATIENT)
Dept: RADIATION ONCOLOGY | Facility: HOSPITAL | Age: 62
End: 2022-09-26

## 2022-09-26 VITALS
TEMPERATURE: 98.1 F | SYSTOLIC BLOOD PRESSURE: 141 MMHG | RESPIRATION RATE: 16 BRPM | BODY MASS INDEX: 24.04 KG/M2 | HEART RATE: 84 BPM | WEIGHT: 127.21 LBS | OXYGEN SATURATION: 94 % | DIASTOLIC BLOOD PRESSURE: 88 MMHG

## 2022-09-26 DIAGNOSIS — C34.12 MALIGNANT NEOPLASM OF UPPER LOBE OF LEFT LUNG: Primary | ICD-10-CM

## 2022-09-26 PROCEDURE — 99214 OFFICE O/P EST MOD 30 MIN: CPT | Performed by: RADIOLOGY

## 2022-09-26 PROCEDURE — G0463 HOSPITAL OUTPT CLINIC VISIT: HCPCS | Performed by: RADIOLOGY

## 2022-09-26 NOTE — PROGRESS NOTES
Follow Up Office Visit      Encounter Date: 09/26/2022   Patient Name: Gabriella Jha  YOB: 1960   Medical Record Number: [id number]   Primary Diagnosis: Malignant neoplasm of upper lobe of left lung (HCC) [C34.12]     Completion Date: 7/1/2020    Chief Complaint:    Chief Complaint   Patient presents with   • Follow-up   • Lung Cancer       History of Present Illness: Gabriella Jha returns for follow-up after undergoing PET/CT.  This revealed FDG avidity with max SUV of 2.1 in the right upper lobe nodule.  It additionally revealed uptake within the spinous processes of L4 and L5.  The left upper lobe nodule measuring 5 mm was apparent but not FDG avid.  Ms. Jha does report suffering a mechanical fall approximately 2 weeks ago.    Subjective      Review of Systems: Review of Systems   Constitutional: Positive for fatigue (9/10). Negative for appetite change.   Respiratory: Negative for cough and shortness of breath.    Gastrointestinal: Negative for constipation, diarrhea and nausea.   Genitourinary: Negative for difficulty urinating, dysuria, frequency and urgency.   Skin: Negative for rash.   Neurological: Positive for headaches (tension HA hx). Negative for dizziness.   Psychiatric/Behavioral: Negative for sleep disturbance.       The following portions of the patient's history were reviewed and updated as appropriate: allergies, current medications, past family history, past medical history, past social history, past surgical history and problem list.    Medications:     Current Outpatient Medications:   •  ARIPiprazole (ABILIFY) 30 MG tablet, Take 30 mg by mouth Every Night., Disp: , Rfl:   •  atorvastatin (LIPITOR) 20 MG tablet, atorvastatin 20 mg tablet  Take 1 tablet every day by oral route., Disp: , Rfl:   •  Cholecalciferol (Vitamin D3) 1.25 MG (42846 UT) capsule, Take 50,000 Units by mouth Every 7 (Seven) Days.  , Disp: , Rfl:   •  clonazePAM (KlonoPIN) 1 MG tablet, Take 1 mg  by mouth 2 (Two) Times a Day As Needed., Disp: , Rfl:   •  clopidogrel (PLAVIX) 75 MG tablet, Take 75 mg by mouth Daily., Disp: , Rfl:   •  gabapentin (NEURONTIN) 400 MG capsule, Take 400 mg by mouth 3 (Three) Times a Day., Disp: , Rfl:   •  rosuvastatin (CRESTOR) 10 MG tablet, Take 10 mg by mouth every night at bedtime., Disp: , Rfl:   •  losartan-hydrochlorothiazide (HYZAAR) 100-25 MG per tablet, , Disp: , Rfl:   •  venlafaxine XR (EFFEXOR-XR) 150 MG 24 hr capsule, Take 150 mg by mouth Daily., Disp: , Rfl:     Allergies:   Allergies   Allergen Reactions   • Clindamycin Hcl GI Intolerance       ECOG: (1) Restricted in physically strenuous activity, ambulatory and able to do work of light nature  Quality of Life: 80 - Restricted Physical Activity     Objective     Physical Exam:   Vital Signs:   Vitals:    09/26/22 1322   BP: 141/88   Pulse: 84   Resp: 16   Temp: 98.1 °F (36.7 °C)   TempSrc: Temporal   SpO2: 94%   Weight: 57.7 kg (127 lb 3.3 oz)   PainSc: 0-No pain     Body mass index is 24.04 kg/m².     Physical Exam  Constitutional:       General: She is not in acute distress.     Appearance: Normal appearance. She is normal weight. She is not ill-appearing or toxic-appearing.   HENT:      Head: Normocephalic and atraumatic.   Cardiovascular:      Rate and Rhythm: Normal rate and regular rhythm.   Pulmonary:      Effort: Pulmonary effort is normal. No respiratory distress.      Breath sounds: Normal breath sounds. No stridor. No rhonchi or rales.   Chest:      Chest wall: No tenderness.   Skin:     General: Skin is warm and dry.   Neurological:      General: No focal deficit present.      Mental Status: She is alert and oriented to person, place, and time.      Cranial Nerves: No cranial nerve deficit.      Gait: Gait normal.   Psychiatric:         Mood and Affect: Mood normal.         Behavior: Behavior normal.         Judgment: Judgment normal.         Radiographs: CT Chest Without Contrast Diagnostic    Result  Date: 8/25/2022   Enlarging nodule in the right upper lobe and new nodule in the left upper lobe concerning for metastatic disease.  There is stable left perihilar treatment related fibrosis, and there is a stable prevascular mediastinal lymph node and right middle lobe nodule     EDY WOODY MD       Electronically Signed and Approved By: EDY WOODY MD on 8/25/2022 at 8:37             MRI Brain With & Without Contrast    Result Date: 9/21/2022    1. No evidence of intracranial metastasis. 2. No acute intracranial process identified.      ROLLY PEDRO MD       Electronically Signed and Approved By: ROLLY PEDRO MD on 9/21/2022 at 9:23             NM PET/CT Skull Base to Mid Thigh    Result Date: 9/20/2022    1. Interval enlargement of right upper lobe noncalcified pulmonary nodule.  This nodule has maximum SUV of only 2.1 and could be infectious or inflammatory.  Repeat noncontrast CT scan of the chest would be recommended in 3 months.  There is also a new 5 mm pulmonary nodule within the left upper lobe. 2. Hypermetabolic activity involving the spinous processes of the L4 and L5 vertebral bodies.  No underlying lytic or sclerotic bony lesion.  Findings could be related to metastatic disease.  Trauma could also potentially give this appearance.  Consider MRI of the lumbar spine for further characterization.     LEONEL CISNEROS MD       Electronically Signed and Approved By: LEONEL CISNEROS MD on 9/20/2022 at 12:27           I personally reviewed the PET/CT from 9/20/2022 as well as the MRI of the brain with and without contrast from 9/21/2022.  The pertinent findings are as above.      Assessment / Plan        Assessment/Plan:   Gabriella Jha is a 62-year-old female with a left upper lobe nodule presumed primary lung malignancy.  She is status post stereotactic body radiotherapy, completing 55 Gray in 5 fractions as of 7/1/2020.  She has an excellent radiographic response and is clinically stable.  She  has enlargement of a right upper lobe nodule as well as development of a new left upper lobe nodule concerning for new primary lung malignancy. ECOG 0    I discussed the findings of the recent PET/CT with Ms. Jha.  I explained that the right upper lobe lesion is FDG avid consistent with primary lung cancer.  I additionally explained that she has development of a right upper lobe nodule that was not FDG avid on the recent PET but may not demonstrate FDG avidity due to its small size.  I explained that there is a possibility that the right upper lobe nodule does not represent malignancy and that the only way to confirm this is via surgery or biopsy.  In discussing the PET/CT with radiology, the right upper lobe nodule is not easily amenable to percutaneous biopsy.  Given the appearance of the L4 and L5 spinous processes on PET/CT they are not consistent with metastatic disease given the proximity of FDG avidity in the appearance of bone and soft tissue in this region.  I explained these findings in plain terms with Ms. Jha.  I recommended stereotactic body radiotherapy to the right upper lobe nodule, outlining the risks, potential benefits and alternatives to this approach.  Ms. Jha is agreeable to my recommendation and is scheduled for 4DCT for treatment planning purposes.      Carl Kurtz MD  Radiation Oncology  [facility]    This document has been signed by Carl Kurtz MD on September 26, 2022 15:12 EDT

## 2022-09-27 ENCOUNTER — HOSPITAL ENCOUNTER (OUTPATIENT)
Dept: RADIATION ONCOLOGY | Facility: HOSPITAL | Age: 62
Setting detail: RADIATION/ONCOLOGY SERIES
End: 2022-09-27

## 2022-09-28 PROCEDURE — 77263 THER RADIOLOGY TX PLNG CPLX: CPT | Performed by: RADIOLOGY

## 2022-09-30 ENCOUNTER — HOSPITAL ENCOUNTER (OUTPATIENT)
Dept: RADIATION ONCOLOGY | Facility: HOSPITAL | Age: 62
Discharge: HOME | End: 2022-09-30

## 2022-09-30 DIAGNOSIS — C34.11 MALIGNANT NEOPLASM OF UPPER LOBE, RIGHT BRONCHUS OR LUNG: ICD-10-CM

## 2022-09-30 PROCEDURE — 77334 RADIATION TREATMENT AID(S): CPT | Performed by: RADIOLOGY

## 2022-10-03 ENCOUNTER — HOSPITAL ENCOUNTER (OUTPATIENT)
Dept: RADIATION ONCOLOGY | Facility: HOSPITAL | Age: 62
Setting detail: RADIATION/ONCOLOGY SERIES
End: 2022-10-03

## 2022-10-06 ENCOUNTER — HOSPITAL ENCOUNTER (OUTPATIENT)
Dept: RADIATION ONCOLOGY | Facility: HOSPITAL | Age: 62
Discharge: HOME | End: 2022-10-06

## 2022-10-10 ENCOUNTER — HOSPITAL ENCOUNTER (OUTPATIENT)
Dept: RADIATION ONCOLOGY | Facility: HOSPITAL | Age: 62
Discharge: HOME | End: 2022-10-10

## 2022-10-10 ENCOUNTER — DOCUMENTATION (OUTPATIENT)
Dept: RADIATION ONCOLOGY | Facility: HOSPITAL | Age: 62
End: 2022-10-10

## 2022-10-10 VITALS
RESPIRATION RATE: 16 BRPM | BODY MASS INDEX: 24.41 KG/M2 | HEART RATE: 93 BPM | DIASTOLIC BLOOD PRESSURE: 85 MMHG | SYSTOLIC BLOOD PRESSURE: 135 MMHG | WEIGHT: 129.19 LBS | OXYGEN SATURATION: 92 % | TEMPERATURE: 97.4 F

## 2022-10-10 DIAGNOSIS — C34.11 MALIGNANT NEOPLASM OF UPPER LOBE, RIGHT BRONCHUS OR LUNG: Primary | ICD-10-CM

## 2022-10-10 LAB
RAD ONC ARIA COURSE ID: NORMAL
RAD ONC ARIA COURSE INTENT: NORMAL
RAD ONC ARIA COURSE LAST TREATMENT DATE: NORMAL
RAD ONC ARIA COURSE START DATE: NORMAL
RAD ONC ARIA COURSE TREATMENT ELAPSED DAYS: 0
RAD ONC ARIA FIRST TREATMENT DATE: NORMAL
RAD ONC ARIA PLAN FRACTIONS TREATED TO DATE: 1
RAD ONC ARIA PLAN ID: NORMAL
RAD ONC ARIA PLAN NAME: NORMAL
RAD ONC ARIA PLAN PRESCRIBED DOSE PER FRACTION: 11 GY
RAD ONC ARIA PLAN PRIMARY REFERENCE POINT: NORMAL
RAD ONC ARIA PLAN TOTAL FRACTIONS PRESCRIBED: 5
RAD ONC ARIA PLAN TOTAL PRESCRIBED DOSE: 5500 CGY
RAD ONC ARIA REFERENCE POINT DOSAGE GIVEN TO DATE: 11 GY
RAD ONC ARIA REFERENCE POINT ID: NORMAL
RAD ONC ARIA REFERENCE POINT SESSION DOSAGE GIVEN: 11 GY

## 2022-10-10 PROCEDURE — 77338 DESIGN MLC DEVICE FOR IMRT: CPT | Performed by: RADIOLOGY

## 2022-10-10 PROCEDURE — 77300 RADIATION THERAPY DOSE PLAN: CPT | Performed by: RADIOLOGY

## 2022-10-10 PROCEDURE — 77373 STRTCTC BDY RAD THER TX DLVR: CPT | Performed by: RADIOLOGY

## 2022-10-10 PROCEDURE — 77293 RESPIRATOR MOTION MGMT SIMUL: CPT | Performed by: RADIOLOGY

## 2022-10-10 PROCEDURE — 77301 RADIOTHERAPY DOSE PLAN IMRT: CPT | Performed by: RADIOLOGY

## 2022-10-10 PROCEDURE — 77435 SBRT MANAGEMENT: CPT | Performed by: RADIOLOGY

## 2022-10-10 NOTE — PROGRESS NOTES
Stereotactic Body Radiotherapy Note    10/10/2022        Treatment Site: right middle lobe  Energy: 6X  Dose: 1100cGy  #Fx: 1  Start Date: 10/10/2022  Elapsed Days: 0      [Associated problem not found]  Cancer Staging   No matching staging information was found for the patient.     Current Outpatient Medications on File Prior to Encounter   Medication Sig   • ARIPiprazole (ABILIFY) 30 MG tablet Take 30 mg by mouth Every Night.   • atorvastatin (LIPITOR) 20 MG tablet atorvastatin 20 mg tablet   Take 1 tablet every day by oral route.   • Cholecalciferol (Vitamin D3) 1.25 MG (04939 UT) capsule Take 50,000 Units by mouth Every 7 (Seven) Days.     • clonazePAM (KlonoPIN) 1 MG tablet Take 1 mg by mouth 2 (Two) Times a Day As Needed.   • clopidogrel (PLAVIX) 75 MG tablet Take 75 mg by mouth Daily.   • gabapentin (NEURONTIN) 400 MG capsule Take 400 mg by mouth 3 (Three) Times a Day.   • losartan-hydrochlorothiazide (HYZAAR) 100-25 MG per tablet    • rosuvastatin (CRESTOR) 10 MG tablet Take 10 mg by mouth every night at bedtime.   • venlafaxine XR (EFFEXOR-XR) 150 MG 24 hr capsule Take 150 mg by mouth Daily.     No current facility-administered medications on file prior to encounter.     Vitals:    10/10/22 1552   BP: 135/85   Pulse: 93   Resp: 16   Temp: 97.4 °F (36.3 °C)   SpO2: 92%     Pain Score    10/10/22 1552   PainSc: 0-No pain       Oncology/Hematology History   Malignant neoplasm of upper lobe, right bronchus or lung (HCC)   9/30/2022 Initial Diagnosis    Malignant neoplasm of upper lobe, right bronchus or lung (HCC)     9/30/2022 -  Radiation    RADIATION THERAPY Treatment Details (Noted on 9/30/2022)  Site: Right Lung  Technique: SBRT  Goal: No goal specified  Planned Treatment Start Date: No planned start date specified         Review of Systems   Constitutional: Positive for fatigue. Negative for appetite change.   HENT: Negative for sore throat and trouble swallowing.    Respiratory: Negative for cough and  shortness of breath.    Gastrointestinal: Negative for constipation, diarrhea and nausea.   Genitourinary: Negative for difficulty urinating, dysuria, frequency and urgency.   Skin: Negative for rash.   Neurological: Negative for dizziness and headache.   Psychiatric/Behavioral: Negative for sleep disturbance.            Physical Exam    Comments: A stereotactic ablative radiation plan was devised to deliver the dose as indicated above. The patient was positioned on the treatment couch in a Vac-Fix immobilization device.  We then aligned with CBCT image guidance, which I personally checked. Once alignment was verified, we delivered the prescription dose using dynamic respiratory motion compensation under my guidance.    The Medical Physicist was also in attendance during patient set-up and treatment delivery.    The patient tolerated the procedure without incident.    Carl Kurtz MD  10/10/2022

## 2022-10-10 NOTE — PROGRESS NOTES
Diagnosis: Lung cancer    Reason for Referral: New radiation therapy tx start    Current Tx Plan: SBRT- 5 fractions    Previous Cancer Tx (if applicable): Underwent SBRT previously before in 2020    Most Recent PHQ -2/PHQ-9 Score: 3     Mood: Pt presented in a pleasant mood, forward thinking and was easily engaged in conversation today.    Current or Past Mental Health Tx: Pt has h/o anxiety and depression noted on file and confirms she is taking Abilify, Effexor, and Klonopin prescribed and managed by PCP.     Support System: Pt reports she has sisters that she speaks with for support that reside locally. Pt has one child - a daughter who is an RN on our inpatient psych unit. Pt has four grandchildren by her daughter (2 young adults and a 14 year old).    Hobbies: Pt reports she stays home often and watches television.    Residential status/Who lives in the home: Pt resides in an apartment in [facility] by herself. Pt is .     Home Care Needs: None identified today    Insurance: [employer]    Finances: Pt identifies no financial concerns with meeting basic needs or gas expenses, however, reports experiencing some medical debt. Discussed opportunity to apply for [employer] financial assistance program and provided her with application and financial counselors' contact information. Pt's source of income is social security survivor's benefits, which she began drawing at age 59.     Transportation: Pt provides her own transportation to txs and voiced no concerns with gas expenses.    Resources/Referrals: [employer] financial assistance program (FAP) application/financial counselors; emotional support    Additional Comment: OSW met with pt in rad onc during tx to introduce OSW role and assess for psychosocial needs. Provided pt with [employer] F[employer] application today and financial counselors' contact information. Pt reports one of her sisters recently had a brain aneurysm and is up at [facility]. Pt plans to go visit her  today after tx. Provided pt with my business card, encouraging OSW support remains available.

## 2022-10-12 ENCOUNTER — HOSPITAL ENCOUNTER (OUTPATIENT)
Dept: RADIATION ONCOLOGY | Facility: HOSPITAL | Age: 62
Discharge: HOME | End: 2022-10-12

## 2022-10-12 VITALS
TEMPERATURE: 97.5 F | SYSTOLIC BLOOD PRESSURE: 140 MMHG | OXYGEN SATURATION: 92 % | DIASTOLIC BLOOD PRESSURE: 89 MMHG | WEIGHT: 129.63 LBS | BODY MASS INDEX: 24.49 KG/M2 | RESPIRATION RATE: 16 BRPM | HEART RATE: 89 BPM

## 2022-10-12 DIAGNOSIS — C34.11 MALIGNANT NEOPLASM OF UPPER LOBE, RIGHT BRONCHUS OR LUNG: Primary | ICD-10-CM

## 2022-10-12 LAB
RAD ONC ARIA COURSE ID: NORMAL
RAD ONC ARIA COURSE INTENT: NORMAL
RAD ONC ARIA COURSE LAST TREATMENT DATE: NORMAL
RAD ONC ARIA COURSE START DATE: NORMAL
RAD ONC ARIA COURSE TREATMENT ELAPSED DAYS: 2
RAD ONC ARIA FIRST TREATMENT DATE: NORMAL
RAD ONC ARIA PLAN FRACTIONS TREATED TO DATE: 2
RAD ONC ARIA PLAN ID: NORMAL
RAD ONC ARIA PLAN NAME: NORMAL
RAD ONC ARIA PLAN PRESCRIBED DOSE PER FRACTION: 11 GY
RAD ONC ARIA PLAN PRIMARY REFERENCE POINT: NORMAL
RAD ONC ARIA PLAN TOTAL FRACTIONS PRESCRIBED: 5
RAD ONC ARIA PLAN TOTAL PRESCRIBED DOSE: 5500 CGY
RAD ONC ARIA REFERENCE POINT DOSAGE GIVEN TO DATE: 22 GY
RAD ONC ARIA REFERENCE POINT ID: NORMAL
RAD ONC ARIA REFERENCE POINT SESSION DOSAGE GIVEN: 11 GY

## 2022-10-12 PROCEDURE — 77373 STRTCTC BDY RAD THER TX DLVR: CPT | Performed by: RADIOLOGY

## 2022-10-12 NOTE — PROGRESS NOTES
Stereotactic Body Radiotherapy Note    10/12/2022    Treatment Site: RUL  Energy: 6X  Dose:  22 of 55  #Fx: 2 of 5  Start Date: 10/10/22  Elapsed Days: 2      [Associated problem not found]   Cancer Staging   No matching staging information was found for the patient.     Vitals:    10/12/22 1535   BP: 140/89   Pulse: 89   Resp: 16   Temp: 97.5 °F (36.4 °C)   SpO2: 92%     Pain Score    10/12/22 1535   PainSc: 0-No pain       Review of Systems: Review of Systems   Constitutional: Negative for appetite change, fatigue and unexpected weight change.   HENT: Negative for sore throat and trouble swallowing.    Respiratory: Negative for cough and shortness of breath.    Cardiovascular: Negative for chest pain and palpitations.   Gastrointestinal: Negative for constipation, diarrhea, nausea and vomiting.   Genitourinary: Negative for difficulty urinating, dysuria, frequency and urgency.   Musculoskeletal: Negative for arthralgias and back pain.   Skin: Negative for color change and rash.   Neurological: Negative for dizziness, weakness, light-headedness and headaches.   Psychiatric/Behavioral: Negative for sleep disturbance.       EXAM:  Gen: Well appearing, NAD  HENT: NC/AT, MMM  Resp: Nonlabored respiration, symmetric expansion  Abd: NT/ND +BS  MSK: ROM intact in all 4 extremities, no obvious joint deformities  Neuro: Alert, Ox3. CN 3-12 grossly intact.   Psych: Appropriate mood and affect.    Comments:   A stereotactic ablative radiation plan was created to deliver the dose as indicated above.   The patient was positioned on the treatment couch in a Vac-Fix immobilization device.   4D CT  Was utilized during simulation to account for respiratory motion during treatment.  Pre-treatment image guidance using cone beam CT imaging was performed to ensure proper setup and alignment.   I personally evaluated and approved the patient's pre-treatment imaging.   I was present for delivery of the patient's treatment. The  prescription dose was delivered as intended.   A Medical Physicist was also in attendance during patient set-up and treatment delivery.    The patient tolerated treatment well and completed without incident.     Cyndy Wolef MD  10/12/2022

## 2022-10-14 ENCOUNTER — HOSPITAL ENCOUNTER (OUTPATIENT)
Dept: RADIATION ONCOLOGY | Facility: HOSPITAL | Age: 62
Discharge: HOME | End: 2022-10-14

## 2022-10-14 VITALS
SYSTOLIC BLOOD PRESSURE: 137 MMHG | DIASTOLIC BLOOD PRESSURE: 86 MMHG | RESPIRATION RATE: 16 BRPM | BODY MASS INDEX: 24.29 KG/M2 | WEIGHT: 128.53 LBS | OXYGEN SATURATION: 95 % | HEART RATE: 83 BPM | TEMPERATURE: 98.3 F

## 2022-10-14 DIAGNOSIS — C34.11 MALIGNANT NEOPLASM OF UPPER LOBE, RIGHT BRONCHUS OR LUNG: Primary | ICD-10-CM

## 2022-10-14 LAB
RAD ONC ARIA COURSE ID: NORMAL
RAD ONC ARIA COURSE INTENT: NORMAL
RAD ONC ARIA COURSE LAST TREATMENT DATE: NORMAL
RAD ONC ARIA COURSE START DATE: NORMAL
RAD ONC ARIA COURSE TREATMENT ELAPSED DAYS: 4
RAD ONC ARIA FIRST TREATMENT DATE: NORMAL
RAD ONC ARIA PLAN FRACTIONS TREATED TO DATE: 3
RAD ONC ARIA PLAN ID: NORMAL
RAD ONC ARIA PLAN NAME: NORMAL
RAD ONC ARIA PLAN PRESCRIBED DOSE PER FRACTION: 11 GY
RAD ONC ARIA PLAN PRIMARY REFERENCE POINT: NORMAL
RAD ONC ARIA PLAN TOTAL FRACTIONS PRESCRIBED: 5
RAD ONC ARIA PLAN TOTAL PRESCRIBED DOSE: 5500 CGY
RAD ONC ARIA REFERENCE POINT DOSAGE GIVEN TO DATE: 33 GY
RAD ONC ARIA REFERENCE POINT ID: NORMAL
RAD ONC ARIA REFERENCE POINT SESSION DOSAGE GIVEN: 11 GY

## 2022-10-14 PROCEDURE — 77373 STRTCTC BDY RAD THER TX DLVR: CPT | Performed by: RADIOLOGY

## 2022-10-14 NOTE — PROGRESS NOTES
On Treatment Visit       Patient: Gabriella Jha   YOB: 1960   Medical Record Number: [id number]     Date of Visit  October 14, 2022   Primary Diagnosis:Malignant neoplasm of upper lobe, right bronchus or lung (HCC) [C34.11]  Cancer Staging: Cancer Staging   No matching staging information was found for the patient.       was seen today for an on treatment visit.  She is receiving radiation therapy to the L. She  has received 3300 cGy in 3 fractions out of a planned dose of 5500 cGy in 5 fractions.     Cough which she is attributing to allergies as allergy medication is worked recently.                                         Review of Systems:   Review of Systems  As per HPI  Vitals:     Vitals:    10/14/22 1216   BP: 137/86   Pulse: 83   Resp: 16   Temp: 98.3 °F (36.8 °C)   SpO2: 95%       Weight:   Wt Readings from Last 3 Encounters:   10/14/22 58.3 kg (128 lb 8.5 oz)   10/12/22 58.8 kg (129 lb 10.1 oz)   10/10/22 58.6 kg (129 lb 3 oz)      Pain:    Pain Score    10/14/22 1216   PainSc: 0-No pain         Physical Exam:  Gen: WD/WN; NAD  HEENT: MMM  Trachea: midline  Chest: symmetric  Resp: normal respiratory effort  Extr: warm, well-perfused  Neuro: awake and alert; no aphasia or neglect    Plan: I have reviewed treatment setup notes, checked and approved the daily guidance images.  I reviewed dose delivery, treatment parameters and deemed them appropriate. We plan to continue radiation therapy as prescribed.          Radiation Oncology    Electronically signed by Carl Kurtz MD 10/14/2022  13:21 EDT

## 2022-10-14 NOTE — PROGRESS NOTES
Stereotactic Body Radiotherapy Note    10/14/2022        Treatment Site: right middle lobe  Energy: 6X  Dose: 1100cGy  #Fx: 3  Start Date: 10/10/2022  Elapsed Days: 4      [Associated problem not found]  Cancer Staging   No matching staging information was found for the patient.     Current Outpatient Medications on File Prior to Encounter   Medication Sig   • ARIPiprazole (ABILIFY) 30 MG tablet Take 30 mg by mouth Every Night.   • atorvastatin (LIPITOR) 20 MG tablet atorvastatin 20 mg tablet   Take 1 tablet every day by oral route.   • Cholecalciferol (Vitamin D3) 1.25 MG (07601 UT) capsule Take 50,000 Units by mouth Every 7 (Seven) Days.     • clonazePAM (KlonoPIN) 1 MG tablet Take 1 mg by mouth 2 (Two) Times a Day As Needed.   • clopidogrel (PLAVIX) 75 MG tablet Take 75 mg by mouth Daily.   • gabapentin (NEURONTIN) 400 MG capsule Take 400 mg by mouth 3 (Three) Times a Day.   • losartan-hydrochlorothiazide (HYZAAR) 100-25 MG per tablet    • rosuvastatin (CRESTOR) 10 MG tablet Take 10 mg by mouth every night at bedtime.   • venlafaxine XR (EFFEXOR-XR) 150 MG 24 hr capsule Take 150 mg by mouth Daily.     No current facility-administered medications on file prior to encounter.     Vitals:    10/14/22 1216   BP: 137/86   Pulse: 83   Resp: 16   Temp: 98.3 °F (36.8 °C)   SpO2: 95%     Pain Score    10/14/22 1216   PainSc: 0-No pain       Oncology/Hematology History   Malignant neoplasm of upper lobe, right bronchus or lung (HCC)   9/30/2022 Initial Diagnosis    Malignant neoplasm of upper lobe, right bronchus or lung (HCC)     9/30/2022 -  Radiation    RADIATION THERAPY Treatment Details (Noted on 9/30/2022)  Site: Right Lung  Technique: SBRT  Goal: No goal specified  Planned Treatment Start Date: No planned start date specified         Review of Systems   Constitutional: Negative for appetite change, fatigue and unexpected weight loss.   HENT: Negative for sore throat and trouble swallowing.    Respiratory: Positive  for cough (Congested, with yellow secretions). Negative for shortness of breath.    Cardiovascular: Negative for chest pain and palpitations.   Gastrointestinal: Negative for constipation, diarrhea, nausea and vomiting.   Genitourinary: Negative for difficulty urinating, dysuria, frequency and urgency.   Musculoskeletal: Negative for arthralgias and back pain.   Skin: Negative for color change and rash.   Neurological: Negative for dizziness, weakness and headache.   Psychiatric/Behavioral: Negative for sleep disturbance.            Physical Exam    Comments: A stereotactic ablative radiation plan was devised to deliver the dose as indicated above. The patient was positioned on the treatment couch in a Vac-Fix immobilization device.  We then aligned with CBCT image guidance, which I personally checked. Once alignment was verified, we delivered the prescription dose using dynamic respiratory motion compensation under my guidance.    The Medical Physicist was also in attendance during patient set-up and treatment delivery.    The patient tolerated the procedure without incident.    Carl Kurtz MD  10/14/2022

## 2022-10-17 ENCOUNTER — HOSPITAL ENCOUNTER (OUTPATIENT)
Dept: RADIATION ONCOLOGY | Facility: HOSPITAL | Age: 62
Discharge: HOME | End: 2022-10-17

## 2022-10-17 VITALS
WEIGHT: 128.09 LBS | SYSTOLIC BLOOD PRESSURE: 131 MMHG | DIASTOLIC BLOOD PRESSURE: 74 MMHG | OXYGEN SATURATION: 91 % | BODY MASS INDEX: 24.2 KG/M2 | HEART RATE: 80 BPM | RESPIRATION RATE: 16 BRPM | TEMPERATURE: 97.1 F

## 2022-10-17 DIAGNOSIS — C34.11 MALIGNANT NEOPLASM OF UPPER LOBE, RIGHT BRONCHUS OR LUNG: Primary | ICD-10-CM

## 2022-10-17 LAB
RAD ONC ARIA COURSE ID: NORMAL
RAD ONC ARIA COURSE INTENT: NORMAL
RAD ONC ARIA COURSE LAST TREATMENT DATE: NORMAL
RAD ONC ARIA COURSE START DATE: NORMAL
RAD ONC ARIA COURSE TREATMENT ELAPSED DAYS: 7
RAD ONC ARIA FIRST TREATMENT DATE: NORMAL
RAD ONC ARIA PLAN FRACTIONS TREATED TO DATE: 4
RAD ONC ARIA PLAN ID: NORMAL
RAD ONC ARIA PLAN NAME: NORMAL
RAD ONC ARIA PLAN PRESCRIBED DOSE PER FRACTION: 11 GY
RAD ONC ARIA PLAN PRIMARY REFERENCE POINT: NORMAL
RAD ONC ARIA PLAN TOTAL FRACTIONS PRESCRIBED: 5
RAD ONC ARIA PLAN TOTAL PRESCRIBED DOSE: 5500 CGY
RAD ONC ARIA REFERENCE POINT DOSAGE GIVEN TO DATE: 44 GY
RAD ONC ARIA REFERENCE POINT ID: NORMAL
RAD ONC ARIA REFERENCE POINT SESSION DOSAGE GIVEN: 11 GY

## 2022-10-17 PROCEDURE — 77373 STRTCTC BDY RAD THER TX DLVR: CPT | Performed by: RADIOLOGY

## 2022-10-17 NOTE — PROGRESS NOTES
Stereotactic Body Radiotherapy Note    10/17/2022        Treatment Site: right middle lobe  Energy: 6X  Dose: 4400cGy  #Fx: 4  Start Date: 10/10/2022  Elapsed Days: 7      [Associated problem not found]  Cancer Staging   No matching staging information was found for the patient.     Current Outpatient Medications on File Prior to Encounter   Medication Sig   • ARIPiprazole (ABILIFY) 30 MG tablet Take 30 mg by mouth Every Night.   • atorvastatin (LIPITOR) 20 MG tablet atorvastatin 20 mg tablet   Take 1 tablet every day by oral route.   • Cholecalciferol (Vitamin D3) 1.25 MG (76063 UT) capsule Take 50,000 Units by mouth Every 7 (Seven) Days.     • clonazePAM (KlonoPIN) 1 MG tablet Take 1 mg by mouth 2 (Two) Times a Day As Needed.   • clopidogrel (PLAVIX) 75 MG tablet Take 75 mg by mouth Daily.   • gabapentin (NEURONTIN) 400 MG capsule Take 400 mg by mouth 3 (Three) Times a Day.   • losartan-hydrochlorothiazide (HYZAAR) 100-25 MG per tablet    • rosuvastatin (CRESTOR) 10 MG tablet Take 10 mg by mouth every night at bedtime.   • venlafaxine XR (EFFEXOR-XR) 150 MG 24 hr capsule Take 150 mg by mouth Daily.     No current facility-administered medications on file prior to encounter.     Vitals:    10/17/22 1042   BP: 131/74   Pulse: 80   Resp: 16   Temp: 97.1 °F (36.2 °C)   SpO2: 91%     There were no vitals filed for this visit.    Oncology/Hematology History   Malignant neoplasm of upper lobe, right bronchus or lung (HCC)   9/30/2022 Initial Diagnosis    Malignant neoplasm of upper lobe, right bronchus or lung (HCC)     9/30/2022 -  Radiation    RADIATION THERAPY Treatment Details (Noted on 9/30/2022)  Site: Right Lung  Technique: SBRT  Goal: No goal specified  Planned Treatment Start Date: No planned start date specified         Review of Systems         Physical Exam    Comments: A stereotactic ablative radiation plan was devised to deliver the dose as indicated above. The patient was positioned on the treatment couch  in a Vac-Fix immobilization device.  We then aligned with CBCT image guidance, which I personally checked. Once alignment was verified, we delivered the prescription dose using dynamic respiratory motion compensation under my guidance.    The Medical Physicist was also in attendance during patient set-up and treatment delivery.    The patient tolerated the procedure without incident.    Carl Kurtz MD  10/17/2022

## 2022-10-17 NOTE — PROGRESS NOTES
On Treatment Visit       Patient: Gabriella Jha   YOB: 1960   Medical Record Number: [id number]     Date of Visit  October 17, 2022   Primary Diagnosis:Malignant neoplasm of upper lobe, right bronchus or lung (HCC) [C34.11]  Cancer Staging: Cancer Staging   No matching staging information was found for the patient.       was seen today for an on treatment visit.  She is receiving radiation therapy to the RML. She  has received 4400 cGy in 4 fractions out of a planned dose of 5500 cGy in 5 fractions.     Persistent cough, but not bothersome; chronic cough.                                         Review of Systems:   Review of Systems   Constitutional: Positive for fatigue (8/10). Negative for appetite change.   HENT: Negative for sore throat and trouble swallowing.    Respiratory: Positive for cough (productive with clear secretions ). Negative for shortness of breath.    Gastrointestinal: Negative for constipation, diarrhea and nausea.   Genitourinary: Negative for difficulty urinating, dysuria, frequency and urgency.   Skin: Negative for rash.   Neurological: Negative for dizziness and headaches.   Psychiatric/Behavioral: Negative for sleep disturbance.     As per HPI  Vitals:     Vitals:    10/17/22 1042   BP: 131/74   Pulse: 80   Resp: 16   Temp: 97.1 °F (36.2 °C)   SpO2: 91%       Weight:   Wt Readings from Last 3 Encounters:   10/17/22 58.1 kg (128 lb 1.4 oz)   10/14/22 58.3 kg (128 lb 8.5 oz)   10/12/22 58.8 kg (129 lb 10.1 oz)      Pain:    There were no vitals filed for this visit.      Physical Exam:  Gen: WD/WN; NAD  HEENT: MMM  Trachea: midline  Chest: symmetric  Resp: normal respiratory effort  Extr: warm, well-perfused  Neuro: awake and alert; no aphasia or neglect    Plan: I have reviewed treatment setup notes, checked and approved the daily guidance images.  I reviewed dose delivery, treatment parameters and deemed them appropriate. We plan to continue radiation therapy as  prescribed.          Radiation Oncology    Electronically signed by Carl Kurtz MD 10/17/2022  10:57 EDT

## 2022-10-19 ENCOUNTER — HOSPITAL ENCOUNTER (OUTPATIENT)
Dept: RADIATION ONCOLOGY | Facility: HOSPITAL | Age: 62
Discharge: HOME | End: 2022-10-19

## 2022-10-19 VITALS
HEART RATE: 82 BPM | TEMPERATURE: 97.9 F | WEIGHT: 128 LBS | RESPIRATION RATE: 16 BRPM | OXYGEN SATURATION: 93 % | DIASTOLIC BLOOD PRESSURE: 84 MMHG | SYSTOLIC BLOOD PRESSURE: 130 MMHG | BODY MASS INDEX: 24.19 KG/M2

## 2022-10-19 DIAGNOSIS — C34.11 MALIGNANT NEOPLASM OF UPPER LOBE, RIGHT BRONCHUS OR LUNG: Primary | ICD-10-CM

## 2022-10-19 LAB
RAD ONC ARIA COURSE ID: NORMAL
RAD ONC ARIA COURSE INTENT: NORMAL
RAD ONC ARIA COURSE LAST TREATMENT DATE: NORMAL
RAD ONC ARIA COURSE START DATE: NORMAL
RAD ONC ARIA COURSE TREATMENT ELAPSED DAYS: 9
RAD ONC ARIA FIRST TREATMENT DATE: NORMAL
RAD ONC ARIA PLAN FRACTIONS TREATED TO DATE: 5
RAD ONC ARIA PLAN ID: NORMAL
RAD ONC ARIA PLAN NAME: NORMAL
RAD ONC ARIA PLAN PRESCRIBED DOSE PER FRACTION: 11 GY
RAD ONC ARIA PLAN PRIMARY REFERENCE POINT: NORMAL
RAD ONC ARIA PLAN TOTAL FRACTIONS PRESCRIBED: 5
RAD ONC ARIA PLAN TOTAL PRESCRIBED DOSE: 5500 CGY
RAD ONC ARIA REFERENCE POINT DOSAGE GIVEN TO DATE: 55 GY
RAD ONC ARIA REFERENCE POINT ID: NORMAL
RAD ONC ARIA REFERENCE POINT SESSION DOSAGE GIVEN: 11 GY

## 2022-10-19 PROCEDURE — 77373 STRTCTC BDY RAD THER TX DLVR: CPT | Performed by: RADIOLOGY

## 2022-10-19 PROCEDURE — 77336 RADIATION PHYSICS CONSULT: CPT | Performed by: RADIOLOGY

## 2022-10-19 NOTE — PROGRESS NOTES
Stereotactic Body Radiotherapy Note    10/19/2022    [unfilled]    Treatment Site:  Energy: 6X  Dose: 5500 cGy  #Fx: 5/5  Start Date: 10/10/2022  Elapsed Days: 9    (C34.11) Malignant neoplasm of upper lobe, right bronchus or lung (HCC)   Cancer Staging   No matching staging information was found for the patient.     Current Outpatient Medications on File Prior to Encounter   Medication Sig   • ARIPiprazole (ABILIFY) 30 MG tablet Take 30 mg by mouth Every Night.   • atorvastatin (LIPITOR) 20 MG tablet atorvastatin 20 mg tablet   Take 1 tablet every day by oral route.   • Cholecalciferol (Vitamin D3) 1.25 MG (99172 UT) capsule Take 50,000 Units by mouth Every 7 (Seven) Days.     • clonazePAM (KlonoPIN) 1 MG tablet Take 1 mg by mouth 2 (Two) Times a Day As Needed.   • clopidogrel (PLAVIX) 75 MG tablet Take 75 mg by mouth Daily.   • gabapentin (NEURONTIN) 400 MG capsule Take 400 mg by mouth 3 (Three) Times a Day.   • losartan-hydrochlorothiazide (HYZAAR) 100-25 MG per tablet    • rosuvastatin (CRESTOR) 10 MG tablet Take 10 mg by mouth every night at bedtime.   • venlafaxine XR (EFFEXOR-XR) 150 MG 24 hr capsule Take 150 mg by mouth Daily.     No current facility-administered medications on file prior to encounter.     Vitals:    10/19/22 1025   BP: 130/84   Pulse: 82   Resp: 16   Temp: 97.9 °F (36.6 °C)   SpO2: 93%     Pain Score    10/19/22 1025   PainSc: 0-No pain       Oncology/Hematology History   Malignant neoplasm of upper lobe, right bronchus or lung (HCC)   9/30/2022 Initial Diagnosis    Malignant neoplasm of upper lobe, right bronchus or lung (HCC)     9/30/2022 -  Radiation    RADIATION THERAPY Treatment Details (Noted on 9/30/2022)  Site: Right Lung  Technique: SBRT  Goal: No goal specified  Planned Treatment Start Date: No planned start date specified         Review of Systems   Constitutional: Negative for appetite change, fatigue and unexpected weight loss.   HENT: Negative for sore throat and  trouble swallowing.    Respiratory: Negative for cough and shortness of breath.    Cardiovascular: Negative for chest pain and palpitations.   Gastrointestinal: Negative for constipation, diarrhea, nausea and vomiting.   Genitourinary: Negative for difficulty urinating, dysuria, frequency and urgency.   Musculoskeletal: Negative for arthralgias and back pain.   Skin: Negative for color change and rash.   Neurological: Negative for dizziness, weakness, light-headedness and headache.   Psychiatric/Behavioral: Negative for sleep disturbance.          Physical Exam  Constitutional:       General: She is not in acute distress.  Pulmonary:      Effort: Pulmonary effort is normal.      Breath sounds: Wheezing present.      Comments: Patient notes that she did not use her inhaler this morning.  Neurological:      Mental Status: She is alert.         Comments: A stereotactic ablative radiation plan was devised to deliver the dose as indicated above. The patient was positioned on the treatment couch in a Vac-Fix immobilization device.  We then aligned with CBCT image guidance, which I personally checked. Once alignment was verified, we delivered the prescription dose using dynamic respiratory motion compensation under my guidance.    The Medical Physicist was also in attendance during patient set-up and treatment delivery.    The patient tolerated the procedure without incident.    Kendra Weinstein MD  Radiation Oncology   10/19/2022

## 2022-10-20 NOTE — PROGRESS NOTES
COMPLETION NOTE    PATIENT:   Gabriella Jha  :    1960  COMPLETION DATE:   10/19/2022  TREATMENT START DATE:            10/10/2022  ELAPSED DAYS:                    9  TREATMENT INTENT: Curative  DIAGNOSIS:      Diagnosis Plan   1. Malignant neoplasm of upper lobe, right bronchus or lung (HCC)              BRIEF HISTORY: Ms. Jha was initially found to have a left upper lobe lung nodule in mid .  This was PET avid.  She was not a surgical candidate and was treated for clinical diagnosis of lung cancer with 5 fraction SBRT to a dose of 5500 cGy.  She has done well with regard to that lesion.  Surveillance imaging on 2022 showed a new subcentimeter right upper lobe nodule.  Repeat scan 3 months later showed interval enlargement and a new small left upper lobe nodule.  The right upper lobe nodule was FDG avid on PET scan.  It was elected to empirically pursue SBRT for the right upper lobe nodule.     The patient received radiotherapy in our department as follows.  Treatment was completed as prescribed:    TREATMENT COURSE:   The right upper lobe nodule was treated using a volumetric modulated arc therapy technique with 6 MV photons.  Total dose was 5500 cGy in 5 fractions of 1100 cGy each, over an elapsed time of 9 days.    TOLERANCE: The patient tolerated therapy well with no new complaints and no change in respiratory status.  RESPONSE:    To be assessed at follow-up with repeat CT scan of the chest.    DISPOSITION:  At the completion of therapy an appointment was made for the patient to return on 2022. Gabriella Jha knows to call if they have any problems sooner.        Kendra Weinstein MD  Radiation Oncology

## 2022-10-20 NOTE — ADDENDUM NOTE
Encounter addended by: Kendra Weinstein MD on: 10/20/2022 1:55 PM   Actions taken: Clinical Note Signed

## 2022-11-02 ENCOUNTER — PATIENT ROUNDING (BHMG ONLY) (OUTPATIENT)
Dept: RADIATION ONCOLOGY | Facility: HOSPITAL | Age: 62
End: 2022-11-02

## 2022-11-02 NOTE — PROGRESS NOTES
"Patient completed radiation treatment for Lung cancer on 10/19/2022 Following up with patient regarding any concerns the patient may have at this time and receiving feedback in regards to patient over all care while under treatment.     Patient asked about symptoms and side effects that continue to be bothersome. Patient denies any side effects at this time. Patient has no c/o pain or fatigue. No new breathing issues stated. Does c/o ongoing cough that is productive with clear secretions.       Patient states that Pain is at 0 on scale of 0/10. Patient states medications have not changed. No refills needed at this time.    Patient was asked if there was anything that could've made their experience better at [facility] while they were under treatment. Patient states: \"No, Yall done a great job.\"    Patient encouraged to call the office if any concerns arise. Patient reminded of Follow up appointment on 11/18/2022 @ [id number]    "

## 2022-11-17 NOTE — PROGRESS NOTES
Follow Up Office Visit      Encounter Date: 11/18/2022   Patient Name: Gabriella Jha  YOB: 1960   Medical Record Number: [id number]   Primary Diagnosis: Malignant neoplasm of upper lobe, right bronchus or lung (HCC) [C34.11]     Radiation Completion Date:  10/19/2022; RUL SBRT    Chief Complaint:    Chief Complaint   Patient presents with   • Follow-up   • Lung Cancer       Oncology/Hematology History   Malignant neoplasm of upper lobe, right bronchus or lung (HCC)   9/30/2022 Initial Diagnosis    Malignant neoplasm of upper lobe, right bronchus or lung (HCC)     10/10/2022 - 10/19/2022 Radiation    Radiation OncologyTreatment Course:  Gabriella Jha received 5500 cGy in 5 fractions to right upper lobe via SBRT.         History of Present Illness: Gabriella Jha is a 62 y.o. female who returns to [facility] for short interval follow-up after completing stereotactic body radiotherapy to the right upper lobe on 10/19/2022. She reports feeling well overall with no new complaints or concerns. She denies any changes in her breathing since completing treatment. She does have an occasional cough productive of clear-white sputum.     Subjective      Review of Systems: Review of Systems   Constitutional: Positive for fatigue (7/10, ongoing). Negative for appetite change and unexpected weight change.   HENT: Negative.  Negative for hearing loss, sore throat, tinnitus and trouble swallowing.    Eyes: Positive for visual disturbance (Wears glasses, ongoing).   Respiratory: Positive for cough (Congested cough, productive with clear-white secretion  ) and wheezing (Occasional, ongoing). Negative for shortness of breath.    Cardiovascular: Negative.  Negative for chest pain, palpitations and leg swelling.   Gastrointestinal: Negative.  Negative for blood in stool, constipation, diarrhea, nausea and vomiting.   Genitourinary: Negative.  Negative for difficulty urinating, dysuria, frequency,  hematuria and urgency.   Musculoskeletal: Negative.  Negative for arthralgias, back pain, gait problem and joint swelling.   Skin: Negative.  Negative for color change and rash.   Neurological: Positive for dizziness (Occasional with coughing spells, ongoing). Negative for weakness and headaches.   Psychiatric/Behavioral: Negative.  Negative for sleep disturbance.     The following portions of the patient's history were reviewed and updated as appropriate: allergies, current medications, past family history, past medical history, past social history, past surgical history and problem list.    Medications:     Current Outpatient Medications:   •  ARIPiprazole (ABILIFY) 30 MG tablet, Take 30 mg by mouth Every Night., Disp: , Rfl:   •  atorvastatin (LIPITOR) 20 MG tablet, atorvastatin 20 mg tablet  Take 1 tablet every day by oral route., Disp: , Rfl:   •  Cholecalciferol (Vitamin D3) 1.25 MG (41996 UT) capsule, Take 50,000 Units by mouth Every 7 (Seven) Days.  , Disp: , Rfl:   •  clonazePAM (KlonoPIN) 1 MG tablet, Take 1 mg by mouth 2 (Two) Times a Day As Needed., Disp: , Rfl:   •  clopidogrel (PLAVIX) 75 MG tablet, Take 75 mg by mouth Daily., Disp: , Rfl:   •  gabapentin (NEURONTIN) 400 MG capsule, Take 400 mg by mouth 3 (Three) Times a Day., Disp: , Rfl:   •  losartan-hydrochlorothiazide (HYZAAR) 100-25 MG per tablet, , Disp: , Rfl:   •  rosuvastatin (CRESTOR) 10 MG tablet, Take 10 mg by mouth every night at bedtime., Disp: , Rfl:   •  venlafaxine XR (EFFEXOR-XR) 150 MG 24 hr capsule, Take 150 mg by mouth Daily., Disp: , Rfl:   •  albuterol sulfate  (90 Base) MCG/ACT inhaler, Inhale 2 puffs Every 4 (Four) Hours As Needed for Wheezing or Shortness of Air., Disp: 8 g, Rfl: 2    Allergies:   Allergies   Allergen Reactions   • Clindamycin Hcl GI Intolerance       Measures:  Pain: (on a scale of 0-10)   Pain Score    11/18/22 1046   PainSc: 0-No pain       Quality of Life: 100 - Full Activity   ECOG: (0) Fully  active, able to carry on all predisease performance without restriction      Objective     Physical Exam:   Vital Signs:   Vitals:    11/18/22 1046   BP: 124/77   Pulse: 79   Resp: 16   Temp: 98.5 °F (36.9 °C)   TempSrc: Temporal   SpO2: 92%   Weight: 58.4 kg (128 lb 12 oz)   PainSc: 0-No pain     Body mass index is 24.33 kg/m².   Wt Readings from Last 3 Encounters:   11/18/22 58.4 kg (128 lb 12 oz)   10/19/22 58.1 kg (128 lb)   10/17/22 58.1 kg (128 lb 1.4 oz)       Physical Exam  Vitals reviewed.   Constitutional:       General: She is not in acute distress.     Appearance: Normal appearance. She is normal weight. She is not ill-appearing.   HENT:      Head: Normocephalic and atraumatic.      Mouth/Throat:      Mouth: Mucous membranes are moist.      Pharynx: Oropharynx is clear. No oropharyngeal exudate or posterior oropharyngeal erythema.   Eyes:      Conjunctiva/sclera: Conjunctivae normal.      Pupils: Pupils are equal, round, and reactive to light.   Cardiovascular:      Rate and Rhythm: Normal rate and regular rhythm.      Pulses: Normal pulses.      Heart sounds: Normal heart sounds.   Pulmonary:      Effort: Pulmonary effort is normal. No respiratory distress.      Breath sounds: No stridor. Wheezing (expiratory wheezes throughout) present. No rhonchi or rales.   Chest:      Chest wall: No tenderness.   Musculoskeletal:         General: Normal range of motion.      Cervical back: Normal range of motion.   Skin:     General: Skin is warm and dry.   Neurological:      General: No focal deficit present.      Mental Status: She is alert and oriented to person, place, and time. Mental status is at baseline.   Psychiatric:         Mood and Affect: Mood normal.         Behavior: Behavior normal.       Result Review:     Imaging: CT Chest Without Contrast Diagnostic    Result Date: 8/25/2022   Enlarging nodule in the right upper lobe and new nodule in the left upper lobe concerning for metastatic disease.  There  is stable left perihilar treatment related fibrosis, and there is a stable prevascular mediastinal lymph node and right middle lobe nodule     EDY WOODY MD       Electronically Signed and Approved By: EDY WOODY MD on 8/25/2022 at 8:37             MRI Brain With & Without Contrast    Result Date: 9/21/2022    1. No evidence of intracranial metastasis. 2. No acute intracranial process identified.      ROLLY PEDRO MD       Electronically Signed and Approved By: ROLLY PEDRO MD on 9/21/2022 at 9:23             NM PET/CT Skull Base to Mid Thigh    Result Date: 9/20/2022    1. Interval enlargement of right upper lobe noncalcified pulmonary nodule.  This nodule has maximum SUV of only 2.1 and could be infectious or inflammatory.  Repeat noncontrast CT scan of the chest would be recommended in 3 months.  There is also a new 5 mm pulmonary nodule within the left upper lobe. 2. Hypermetabolic activity involving the spinous processes of the L4 and L5 vertebral bodies.  No underlying lytic or sclerotic bony lesion.  Findings could be related to metastatic disease.  Trauma could also potentially give this appearance.  Consider MRI of the lumbar spine for further characterization.     LEONEL CISNEROS MD       Electronically Signed and Approved By: LEONEL CISNEROS MD on 9/20/2022 at 12:27                Labs: No labs within the past 90 days.     Assessment / Plan      Impression: Gabriella Jha is a pleasant 62 y.o. female with history of a PET avid left upper lobe nodule presumed primary lung malignancy in 2020. She was not a surgical candidate and was treated for clinical diagnosis of lung cancer. She is status post stereotactic body radiotherapy, completing 55 Gray in 5 fractions on 7/1/2020.  She had an excellent radiographic response. Surveillance imaging on 5/14/2022 showed a new subcentimeter right upper lobe nodule. Repeat scan 3 months later shower interval enlargement and a new small left upper lobe  nodule. She now has a FDG avid right upper lobe lesion consistent with primary lung cancer as per PET/CT scan on 9/20/2022. She is now status post stereotactic body radiotherapy to the right upper lobe, completed on 10/19/2022. She tolerated radiotherapy well overall with no apparent acute toxicities.     Assessment/Plan:   Diagnoses and all orders for this visit:    1. Malignant neoplasm of upper lobe, right bronchus or lung (HCC) (Primary)  -     CT Chest Without Contrast Diagnostic; Future    2. Personal history of radiation therapy    3. Expiratory wheezing  -     albuterol sulfate  (90 Base) MCG/ACT inhaler; Inhale 2 puffs Every 4 (Four) Hours As Needed for Wheezing or Shortness of Air.  Dispense: 8 g; Refill: 2    4. Chronic obstructive pulmonary disease, unspecified COPD type (HCC)  -     albuterol sulfate  (90 Base) MCG/ACT inhaler; Inhale 2 puffs Every 4 (Four) Hours As Needed for Wheezing or Shortness of Air.  Dispense: 8 g; Refill: 2      Follow Up:   1.  CT chest in 2 months to assess treatment response with follow-up thereafter.   2.  She does have a history of COPD and was found to have expiratory wheezing today on physical exam. She is not followed by Pulmonology. I will send a prescription for Albuterol inhaler to the her pharmacy today. Discussed that if she continues to have wheezing at next visit, referral to Pulmonology may be considered.     Return in about 2 months (around 1/18/2023) for Office Visit.  Gabriella Jha was encouraged to contact me in the interim with any questions or concerns regarding her care.        AMADOR Rangel  Radiation Oncology  [facility]    This document has been signed by AMADOR Banda on November 18, 2022 11:25 EST

## 2022-11-18 ENCOUNTER — APPOINTMENT (OUTPATIENT)
Dept: RADIATION ONCOLOGY | Facility: HOSPITAL | Age: 62
End: 2022-11-18

## 2022-11-18 ENCOUNTER — OFFICE VISIT (OUTPATIENT)
Dept: RADIATION ONCOLOGY | Facility: HOSPITAL | Age: 62
End: 2022-11-18

## 2022-11-18 VITALS
TEMPERATURE: 98.5 F | DIASTOLIC BLOOD PRESSURE: 77 MMHG | RESPIRATION RATE: 16 BRPM | WEIGHT: 128.75 LBS | BODY MASS INDEX: 24.33 KG/M2 | OXYGEN SATURATION: 92 % | HEART RATE: 79 BPM | SYSTOLIC BLOOD PRESSURE: 124 MMHG

## 2022-11-18 DIAGNOSIS — R06.2 EXPIRATORY WHEEZING: ICD-10-CM

## 2022-11-18 DIAGNOSIS — C34.11 MALIGNANT NEOPLASM OF UPPER LOBE, RIGHT BRONCHUS OR LUNG: Primary | ICD-10-CM

## 2022-11-18 DIAGNOSIS — Z92.3 PERSONAL HISTORY OF RADIATION THERAPY: ICD-10-CM

## 2022-11-18 DIAGNOSIS — J44.9 CHRONIC OBSTRUCTIVE PULMONARY DISEASE, UNSPECIFIED COPD TYPE: ICD-10-CM

## 2022-11-18 PROCEDURE — G0463 HOSPITAL OUTPT CLINIC VISIT: HCPCS | Performed by: NURSE PRACTITIONER

## 2022-11-18 PROCEDURE — 99024 POSTOP FOLLOW-UP VISIT: CPT | Performed by: NURSE PRACTITIONER

## 2022-11-18 RX ORDER — ALBUTEROL SULFATE 90 UG/1
2 AEROSOL, METERED RESPIRATORY (INHALATION) EVERY 4 HOURS PRN
Qty: 8 G | Refills: 2 | Status: SHIPPED | OUTPATIENT
Start: 2022-11-18

## 2023-01-09 ENCOUNTER — HOSPITAL ENCOUNTER (OUTPATIENT)
Dept: CT IMAGING | Facility: HOSPITAL | Age: 63
Discharge: HOME | End: 2023-01-09
Admitting: NURSE PRACTITIONER
Payer: COMMERCIAL

## 2023-01-09 DIAGNOSIS — C34.11 MALIGNANT NEOPLASM OF UPPER LOBE, RIGHT BRONCHUS OR LUNG: ICD-10-CM

## 2023-01-09 PROCEDURE — 71250 CT THORAX DX C-: CPT

## 2023-01-17 NOTE — PROGRESS NOTES
Follow Up Office Visit      Encounter Date: 01/19/2023   Patient Name: Gabriella Jha  YOB: 1960   Medical Record Number: [id number]   Primary Diagnosis: Malignant neoplasm of upper lobe, right bronchus or lung (HCC) [C34.11]     Radiation Completion Date:  10/19/2022; RUL SBRT    Chief Complaint:    Chief Complaint   Patient presents with   • Follow-up   • Lung Cancer       Oncology/Hematology History   Malignant neoplasm of upper lobe, right bronchus or lung (HCC)   9/30/2022 Initial Diagnosis    Malignant neoplasm of upper lobe, right bronchus or lung (HCC)     10/10/2022 - 10/19/2022 Radiation    Radiation OncologyTreatment Course:  Gabriella Jha received 5500 cGy in 5 fractions to right upper lobe via SBRT.         History of Present Illness: Gabriella Jha is a 62 y.o. female who returns to [facility] for routine 2-month follow-up after completing SBRT to the RUL on 10/19/2022. Her CT scan of the chest without contrast on 1/9/2023 showed the previously noted noncalcified nodules in the right upper lobe and left upper lobe have essentially resolved. There is minimal residual scarring in the right upper lobe. There is stable scarring/treatment related change in the left hilar region. Stable prevascular nodes. No evidence of abnormal hypermetabolic activity on prior PET scan. There is a calcified granuloma in the right lower lobe. She reports feeling well overall with no new complaints or concerns. She states that her breathing/shortness of breath has improved since last visit. She has occasional cough productive of clear sputum. She continues to smoke approximately 1/3 pack/day. She denies hemoptysis, chest tightness, confusion, or focal weakness. She experiences occasional tension headaches resolved with over the counter medication. She also experiences occasional constipation that resolves with use of Miralax.      Subjective      Review of Systems: Review of Systems    Constitutional: Positive for fatigue (6/10). Negative for appetite change.   Eyes: Negative.  Negative for visual disturbance.   Respiratory: Positive for cough (occasional with clear sputum) and shortness of breath (occasionally with exertion). Negative for chest tightness and wheezing.    Cardiovascular: Negative.    Gastrointestinal: Positive for constipation. Negative for blood in stool, diarrhea and nausea.   Genitourinary: Negative.  Negative for difficulty urinating, dysuria, frequency and urgency.   Musculoskeletal: Negative.  Negative for arthralgias and back pain.   Skin: Negative.  Negative for rash.   Neurological: Positive for headaches (tension headaches). Negative for dizziness.   Psychiatric/Behavioral: Negative.  Negative for sleep disturbance.     The following portions of the patient's history were reviewed and updated as appropriate: allergies, current medications, past family history, past medical history, past social history, past surgical history and problem list.    Medications:     Current Outpatient Medications:   •  albuterol sulfate  (90 Base) MCG/ACT inhaler, Inhale 2 puffs Every 4 (Four) Hours As Needed for Wheezing or Shortness of Air., Disp: 8 g, Rfl: 2  •  ARIPiprazole (ABILIFY) 30 MG tablet, Take 30 mg by mouth Every Night., Disp: , Rfl:   •  atorvastatin (LIPITOR) 20 MG tablet, atorvastatin 20 mg tablet  Take 1 tablet every day by oral route., Disp: , Rfl:   •  Cholecalciferol (Vitamin D3) 1.25 MG (41340 UT) capsule, Take 50,000 Units by mouth Every 7 (Seven) Days.  , Disp: , Rfl:   •  clonazePAM (KlonoPIN) 1 MG tablet, Take 1 mg by mouth 2 (Two) Times a Day As Needed., Disp: , Rfl:   •  clopidogrel (PLAVIX) 75 MG tablet, Take 75 mg by mouth Daily., Disp: , Rfl:   •  gabapentin (NEURONTIN) 400 MG capsule, Take 400 mg by mouth 3 (Three) Times a Day., Disp: , Rfl:   •  losartan-hydrochlorothiazide (HYZAAR) 100-25 MG per tablet, , Disp: , Rfl:   •  rosuvastatin (CRESTOR) 10  MG tablet, Take 10 mg by mouth every night at bedtime., Disp: , Rfl:   •  venlafaxine XR (EFFEXOR-XR) 150 MG 24 hr capsule, Take 150 mg by mouth Daily., Disp: , Rfl:     Allergies:   Allergies   Allergen Reactions   • Clindamycin Hcl GI Intolerance       Measures:  Pain: (on a scale of 0-10)   Pain Score    01/19/23 1302   PainSc: 0-No pain       Quality of Life: 100 - Full Activity      ECOG: (0) Fully active, able to carry on all predisease performance without restriction      Objective     Physical Exam:   Vital Signs:   Vitals:    01/19/23 1302   BP: 102/55   Pulse: 84   Resp: 16   Temp: 97.3 °F (36.3 °C)   TempSrc: Temporal   SpO2: 91%   Weight: 55.9 kg (123 lb 3.8 oz)   PainSc: 0-No pain     Body mass index is 23.29 kg/m².   Wt Readings from Last 3 Encounters:   01/19/23 55.9 kg (123 lb 3.8 oz)   11/18/22 58.4 kg (128 lb 12 oz)   10/19/22 58.1 kg (128 lb)       Physical Exam  Vitals reviewed.   Constitutional:       General: She is not in acute distress.     Appearance: Normal appearance. She is normal weight. She is not ill-appearing.   HENT:      Head: Normocephalic and atraumatic.      Mouth/Throat:      Mouth: Mucous membranes are moist.      Pharynx: Oropharynx is clear. No oropharyngeal exudate or posterior oropharyngeal erythema.   Eyes:      Conjunctiva/sclera: Conjunctivae normal.      Pupils: Pupils are equal, round, and reactive to light.   Cardiovascular:      Rate and Rhythm: Normal rate and regular rhythm.      Pulses: Normal pulses.      Heart sounds: Normal heart sounds.   Pulmonary:      Effort: Pulmonary effort is normal. No respiratory distress.      Breath sounds: No stridor. Rhonchi (expiratory rhonchi throughout all lung fields) present. No wheezing or rales.   Chest:      Chest wall: No tenderness.   Abdominal:      General: Bowel sounds are normal.      Palpations: Abdomen is soft.   Musculoskeletal:         General: Normal range of motion.      Cervical back: Normal range of motion.    Skin:     General: Skin is warm and dry.   Neurological:      General: No focal deficit present.      Mental Status: She is alert and oriented to person, place, and time. Mental status is at baseline.   Psychiatric:         Mood and Affect: Mood normal.         Behavior: Behavior normal.       Result Review: I discussed and reviewed CT chest without contrast on 1/9/2023 with Dr. Kurtz. The pertinent findings are as above in the HPI.  -- CT Chest Without Contrast Diagnostic (01/09/2023 14:53)    Imaging:   CT Chest Without Contrast Diagnostic    Result Date: 1/10/2023  PROCEDURE: CT CHEST WO CONTRAST DIAGNOSTIC  COMPARISON: [facility], PET, NM PET/CT SKULL BASE TO MID THIGH, 9/20/2022, 9:19.  [facility], CT, CT CHEST WO CONTRAST DIAGNOSTIC, 8/24/2022, 16:47.  INDICATIONS: Lung cancer of RUL, assess treatment response  PROTOCOL:   Standard imaging protocol performed    RADIATION:   DLP: 102 mGy*cm   Automated exposure control was utilized to minimize radiation dose.  TECHNIQUE: Axial images of the chest without intravenous contrast.  FINDINGS: The previously noted noncalcified nodules in the right upper lobe and left upper lobe have essentially resolved.  There is minimal residual scarring in the right upper lobe.  There is stable scarring/treatment related change in the left hilar region.  No new or enlarging pulmonary nodules are identified.  Degenerative changes are present in the thoracic spine.  No evidence of pleural or pericardial effusion.  No evidence of pneumothorax.  Coronary artery calcification is present.  Stable prevascular nodes.  No evidence of abnormal hypermetabolic activity on the prior PET-CT scan.  The thoracic aorta has a normal caliber.  Images of the unenhanced upper abdomen demonstrates stable hepatic cysts.   IMPRESSION:  1. The previously noted noncalcified nodules in the right upper lobe and left upper lobe have essentially resolved.  There is minimal  residual scarring in the right upper lobe.  2. Stable scarring/treatment related change in the left hilar region.   JUAN CABRERA MD       Electronically Signed and Approved By: JUAN CABRERA MD on 1/10/2023 at 11:02             Labs: No lab results for the last 90 days.    Assessment / Plan      Impression: Gabriella Jha is a pleasant 62 y.o. female with history of a PET avid left upper lobe nodule presumed primary lung malignancy in 2020. She was not a surgical candidate and was treated for clinical diagnosis of lung cancer. She is status post stereotactic body radiotherapy, completing 55 Gray in 5 fractions on 7/1/2020. She had an excellent radiographic response. She subsequently developed a FDG avid right upper lobe lesion consistent with primary lung cancer as per PET/CT scan dated 9/20/2022. She is status post stereotactic body radiotherapy to the right upper lobe, completed on 10/19/2022. Her post-treatment CT scan of the chest without contrast on 1/9/2023 showed good radiographic treatment response as the previously noted noncalcified nodule in the right upper lobe has essentially resolved. There is minimal residual scarring in the right upper lobe consistent with treatment related change. There is stable treatment related fibrosis in the left hilar region consistent with prior radiotherapy. Additionally, the previously noted noncalcified left upper lobe nodule, which was not hypermetabolic on 9/20/2022 PET scan, has also resolved. There is stable prevascular mediastinal lymph node. This showed no evidence of abnormal hypermetabolic activity on prior PET scan dated 9/20/2022. Discussed with Ms. Jha plan for repeat CT chest with contrast for paul characterization and surveillance in 3 months.    COPD: patients reports known history of COPD. She is not followed by Pulmonology. The only inhaler she has is albuterol which I prescribed at her last visit. I discussed that given her lung sounds on  physical exam and history of COPD, I recommend a referral to Pulmonology to establish care for ongoing management. Ms. Jha was in agreement with this recommendation.     Assessment/Plan:   Diagnoses and all orders for this visit:    1. Malignant neoplasm of upper lobe, right bronchus or lung (HCC) (Primary)  -     CT Chest With Contrast Diagnostic; Future    2. Personal history of radiation therapy    3. Malignant neoplasm of upper lobe of left lung (HCC)  -     CT Chest With Contrast Diagnostic; Future    4. Chronic obstructive pulmonary disease, unspecified COPD type (HCC)  -     Ambulatory Referral to Pulmonology    5. Nicotine dependence, cigarettes, uncomplicated    6. Constipation, unspecified constipation type       Follow Up:   1. Return for follow-up in 3 months with repeat CT scan of the chest with contrast for surveillance.  2. Referral to Pulmonology for evaluation and management of COPD.   3. Constipation: she is currently only using Miralax. Recommended use of over the counter stool softeners and increased fiber supplementation.  4. She is a current everyday cigarette smoker. Counseled on smoking cessation and strongly advised her to quit. She verbalizes understanding of need for smoking cessation but unwilling to attempt at this time.      Return in about 3 months (around 4/19/2023) for Office Visit.  Gabriella Jha was encouraged to contact me in the interim with any questions or concerns regarding her care.        AMADOR Rangel  Radiation Oncology  [facility]    This document has been signed by AMADOR Banda on January 19, 2023 13:36 EST

## 2023-01-19 ENCOUNTER — OFFICE VISIT (OUTPATIENT)
Dept: RADIATION ONCOLOGY | Facility: HOSPITAL | Age: 63
End: 2023-01-19
Payer: COMMERCIAL

## 2023-01-19 VITALS
WEIGHT: 123.24 LBS | SYSTOLIC BLOOD PRESSURE: 102 MMHG | HEART RATE: 84 BPM | BODY MASS INDEX: 23.29 KG/M2 | TEMPERATURE: 97.3 F | RESPIRATION RATE: 16 BRPM | OXYGEN SATURATION: 91 % | DIASTOLIC BLOOD PRESSURE: 55 MMHG

## 2023-01-19 DIAGNOSIS — Z92.3 PERSONAL HISTORY OF RADIATION THERAPY: ICD-10-CM

## 2023-01-19 DIAGNOSIS — C34.11 MALIGNANT NEOPLASM OF UPPER LOBE, RIGHT BRONCHUS OR LUNG: Primary | ICD-10-CM

## 2023-01-19 DIAGNOSIS — C34.12 MALIGNANT NEOPLASM OF UPPER LOBE OF LEFT LUNG: ICD-10-CM

## 2023-01-19 DIAGNOSIS — K59.00 CONSTIPATION, UNSPECIFIED CONSTIPATION TYPE: ICD-10-CM

## 2023-01-19 DIAGNOSIS — F17.210 NICOTINE DEPENDENCE, CIGARETTES, UNCOMPLICATED: ICD-10-CM

## 2023-01-19 DIAGNOSIS — J44.9 CHRONIC OBSTRUCTIVE PULMONARY DISEASE, UNSPECIFIED COPD TYPE: ICD-10-CM

## 2023-01-19 PROCEDURE — G0463 HOSPITAL OUTPT CLINIC VISIT: HCPCS | Performed by: NURSE PRACTITIONER

## 2023-01-19 PROCEDURE — 99214 OFFICE O/P EST MOD 30 MIN: CPT | Performed by: NURSE PRACTITIONER

## 2023-01-20 ENCOUNTER — TELEPHONE (OUTPATIENT)
Dept: RADIATION ONCOLOGY | Facility: HOSPITAL | Age: 63
End: 2023-01-20
Payer: COMMERCIAL

## 2023-01-20 NOTE — TELEPHONE ENCOUNTER
Infomred the patient of her upcoming appointment with Dr. Gomez Dos Santos on 02/23/23 @2-.     Patient voiced understanding.

## 2023-04-17 ENCOUNTER — HOSPITAL ENCOUNTER (OUTPATIENT)
Dept: CT IMAGING | Facility: HOSPITAL | Age: 63
Discharge: HOME | End: 2023-04-17
Admitting: NURSE PRACTITIONER
Payer: COMMERCIAL

## 2023-04-17 DIAGNOSIS — C34.12 MALIGNANT NEOPLASM OF UPPER LOBE OF LEFT LUNG: ICD-10-CM

## 2023-04-17 DIAGNOSIS — C34.11 MALIGNANT NEOPLASM OF UPPER LOBE, RIGHT BRONCHUS OR LUNG: ICD-10-CM

## 2023-04-17 LAB
CREAT BLDA-MCNC: 1 MG/DL
EGFRCR SERPLBLD CKD-EPI 2021: 63.4 ML/MIN/1.73

## 2023-04-17 PROCEDURE — 82565 ASSAY OF CREATININE: CPT

## 2023-04-17 PROCEDURE — 71260 CT THORAX DX C+: CPT

## 2023-04-17 PROCEDURE — 25510000001 IOPAMIDOL PER 1 ML: Performed by: NURSE PRACTITIONER

## 2023-04-17 RX ADMIN — IOPAMIDOL 100 ML: 755 INJECTION, SOLUTION INTRAVENOUS at 13:47

## 2023-04-20 ENCOUNTER — OFFICE VISIT (OUTPATIENT)
Dept: RADIATION ONCOLOGY | Facility: HOSPITAL | Age: 63
End: 2023-04-20
Payer: COMMERCIAL

## 2023-04-20 VITALS
TEMPERATURE: 98.9 F | RESPIRATION RATE: 20 BRPM | OXYGEN SATURATION: 92 % | DIASTOLIC BLOOD PRESSURE: 64 MMHG | BODY MASS INDEX: 23.24 KG/M2 | HEART RATE: 94 BPM | SYSTOLIC BLOOD PRESSURE: 102 MMHG | WEIGHT: 123.02 LBS

## 2023-04-20 DIAGNOSIS — J44.9 CHRONIC OBSTRUCTIVE PULMONARY DISEASE, UNSPECIFIED COPD TYPE: ICD-10-CM

## 2023-04-20 DIAGNOSIS — F17.210 NICOTINE DEPENDENCE, CIGARETTES, UNCOMPLICATED: ICD-10-CM

## 2023-04-20 DIAGNOSIS — R06.2 EXPIRATORY WHEEZING: ICD-10-CM

## 2023-04-20 DIAGNOSIS — Z92.3 PERSONAL HISTORY OF RADIATION THERAPY: ICD-10-CM

## 2023-04-20 DIAGNOSIS — C34.11 MALIGNANT NEOPLASM OF UPPER LOBE, RIGHT BRONCHUS OR LUNG: Primary | ICD-10-CM

## 2023-04-20 PROCEDURE — G0463 HOSPITAL OUTPT CLINIC VISIT: HCPCS | Performed by: NURSE PRACTITIONER

## 2023-04-20 RX ORDER — ALBUTEROL SULFATE 90 UG/1
2 AEROSOL, METERED RESPIRATORY (INHALATION) EVERY 4 HOURS PRN
Qty: 18 G | Refills: 2 | Status: SHIPPED | OUTPATIENT
Start: 2023-04-20

## 2023-04-20 NOTE — PROGRESS NOTES
Follow Up Office Visit      Encounter Date: 04/20/2023   Patient Name: Gabriella Jha  YOB: 1960   Medical Record Number: [id number]   Primary Diagnosis: Malignant neoplasm of upper lobe, right bronchus or lung [C34.11]     Radiation Completion Date:  10/19/2022; RUL SBRT    Chief Complaint:    Chief Complaint   Patient presents with   • Follow-up   • Lung Cancer       Oncology/Hematology History   Malignant neoplasm of upper lobe, right bronchus or lung   9/30/2022 Initial Diagnosis    Malignant neoplasm of upper lobe, right bronchus or lung (HCC)     10/10/2022 - 10/19/2022 Radiation    Radiation OncologyTreatment Course:  Gabriella Jha received 5500 cGy in 5 fractions to right upper lobe via SBRT.         History of Present Illness: Gabriella Jha is a 62 y.o. female who returns to [facility] for routine follow-up. Her CT chest with contrast on 4/17/23 shows stable treatment related change in ETELVINA and stable 8 mm prevascular lymph node. She reports feeling well overall with no new complaints or concerns. She states that her breathing/shortness of breath has improved since last visit. She has occasional cough productive of clear sputum. She continues to smoke a few cigarettes a day, stating that she only takes a few puffs from each cigarette and never a full cigarette. She denies hemoptysis, chest tightness, confusion, or focal weakness. She experiences occasional tension headaches resolved with over the counter medication. She has not been seen by pulmonology stating that she had to cancel her appointment and she is not currently interested in re-scheduling.     Subjective      Review of Systems: Review of Systems   Constitutional: Negative for appetite change, fatigue and unexpected weight change.   HENT: Negative for sore throat, tinnitus and trouble swallowing.    Eyes: Negative for visual disturbance.   Respiratory: Positive for cough (occasional productive cough with  clear secretions. ). Negative for shortness of breath.    Cardiovascular: Negative for chest pain, palpitations and leg swelling.   Gastrointestinal: Negative for anal bleeding, blood in stool, constipation and diarrhea.   Genitourinary: Negative for difficulty urinating, dysuria, frequency, hematuria and urgency.   Musculoskeletal: Positive for arthralgias (in hands) and back pain (ongoing).   Skin: Negative for color change and rash.   Neurological: Negative for dizziness and headaches.   Psychiatric/Behavioral: Positive for sleep disturbance.     The following portions of the patient's history were reviewed and updated as appropriate: allergies, current medications, past family history, past medical history, past social history, past surgical history and problem list.    Medications:     Current Outpatient Medications:   •  albuterol sulfate  (90 Base) MCG/ACT inhaler, Inhale 2 puffs Every 4 (Four) Hours As Needed for Wheezing or Shortness of Air., Disp: 18 g, Rfl: 2  •  ARIPiprazole (ABILIFY) 30 MG tablet, Take 1 tablet by mouth Every Night., Disp: , Rfl:   •  atorvastatin (LIPITOR) 20 MG tablet, atorvastatin 20 mg tablet  Take 1 tablet every day by oral route., Disp: , Rfl:   •  Cholecalciferol (Vitamin D3) 1.25 MG (54361 UT) capsule, Take 1 capsule by mouth Every 7 (Seven) Days.  , Disp: , Rfl:   •  clonazePAM (KlonoPIN) 1 MG tablet, Take 1 tablet by mouth 2 (Two) Times a Day As Needed., Disp: , Rfl:   •  clopidogrel (PLAVIX) 75 MG tablet, Take 1 tablet by mouth Daily., Disp: , Rfl:   •  gabapentin (NEURONTIN) 400 MG capsule, Take 1 capsule by mouth 3 (Three) Times a Day., Disp: , Rfl:   •  losartan-hydrochlorothiazide (HYZAAR) 100-25 MG per tablet, , Disp: , Rfl:   •  rosuvastatin (CRESTOR) 10 MG tablet, Take 1 tablet by mouth every night at bedtime., Disp: , Rfl:   •  venlafaxine XR (EFFEXOR-XR) 150 MG 24 hr capsule, Take 1 capsule by mouth Daily., Disp: , Rfl:     Allergies:   Allergies   Allergen  Reactions   • Clindamycin Hcl GI Intolerance     Measures:  Quality of Life: 100 - Full Activity   ECOG score: 0  ECOG: (0) Fully active, able to carry on all predisease performance without restriction  Pain: (on a scale of 0-10)   Pain Score    04/20/23 1329   PainSc: 0-No pain         Objective     Physical Exam:   Vital Signs:   Vitals:    04/20/23 1329   BP: 102/64  Comment: manual   Pulse: 94   Resp: 20   Temp: 98.9 °F (37.2 °C)   TempSrc: Temporal   SpO2: 92%   Weight: 55.8 kg (123 lb 0.3 oz)   PainSc: 0-No pain     Body mass index is 23.24 kg/m².   Wt Readings from Last 3 Encounters:   04/20/23 55.8 kg (123 lb 0.3 oz)   01/19/23 55.9 kg (123 lb 3.8 oz)   11/18/22 58.4 kg (128 lb 12 oz)     Physical Exam  Vitals reviewed.   Constitutional:       General: She is not in acute distress.     Appearance: Normal appearance. She is normal weight. She is not ill-appearing.   HENT:      Head: Normocephalic and atraumatic.      Mouth/Throat:      Mouth: Mucous membranes are moist.      Pharynx: Oropharynx is clear. No oropharyngeal exudate or posterior oropharyngeal erythema.   Eyes:      Conjunctiva/sclera: Conjunctivae normal.      Pupils: Pupils are equal, round, and reactive to light.   Cardiovascular:      Rate and Rhythm: Normal rate and regular rhythm.      Pulses: Normal pulses.      Heart sounds: Normal heart sounds.   Pulmonary:      Effort: Pulmonary effort is normal. No respiratory distress.      Breath sounds: No stridor. No wheezing, rhonchi or rales.      Comments: Decreased throughout, no adventitious lung sounds heard  Chest:      Chest wall: No tenderness.   Abdominal:      General: Bowel sounds are normal.      Palpations: Abdomen is soft.   Musculoskeletal:         General: Normal range of motion.      Cervical back: Normal range of motion.   Skin:     General: Skin is warm and dry.   Neurological:      General: No focal deficit present.      Mental Status: She is alert and oriented to person, place,  and time. Mental status is at baseline.   Psychiatric:         Mood and Affect: Mood normal.         Behavior: Behavior normal.       Result Review: I independently reviewed the following data. I discussed and reviewed imaging with Dr. Kurtz. CT Chest 4/17/23 independently reviewed by Dr. Kurtz.   -- CT Chest With Contrast Diagnostic (04/17/2023 13:48)  -- CT Chest Without Contrast Diagnostic (01/09/2023 14:53)    Imaging: CT Chest With Contrast Diagnostic    Result Date: 4/18/2023    1. No change in 8 mm prevascular lymph node 2. Persistent airspace consolidation within the suprahilar region of the left upper lobe consistent with treatment related change. 3. No evidence of metastatic disease within the chest. 4. Stable low-density masses within the liver which may represent cysts.     TERE CISNEROS MD       Electronically Signed and Approved By: TERE CISNEROS MD on 4/18/2023 at 8:09              Labs: No lab results for the last 90 days.     Assessment / Plan      Impression: Gabriella Jha is a pleasant 62 y.o. female with history of a PET avid left upper lobe nodule presumed primary lung malignancy in 2020. She was not a surgical candidate and was treated for clinical diagnosis of lung cancer. She is status post stereotactic body radiotherapy to left upper lobe, completed on 7/1/2020. She had an excellent radiographic response. She subsequently developed a FDG avid right upper lobe lesion consistent with primary lung cancer as per PET scan dated 9/20/2022. She is status post stereotactic body radiotherapy to the right upper lobe, completed on 10/19/2022. Her post-treatment CT scan of the chest without contrast on 1/9/2023 showed good radiographic treatment response as the previously noted noncalcified nodule in the right upper lobe has essentially resolved. There is minimal residual scarring in the right upper lobe consistent with treatment related change. There is stable treatment related fibrosis in the  left hilar region consistent with prior radiotherapy. Additionally, the previously noted noncalcified left upper lobe nodule, which was not hypermetabolic on 9/20/2022 PET scan, has also resolved. There is stable prevascular mediastinal lymph node. This showed no evidence of abnormal hypermetabolic activity on prior PET scan dated 9/20/2022. Her CT chest with contrast on 4/17/2023 revealed persistent airspace consolidation within the suprahilar region of the left upper lobe consistent with treatment related change. No change in 8 mm prevascular lymph node and no evidence of metastatic disease within the chest.    COPD: patients reports known history of COPD. She is not followed by Pulmonology. At prior visit I recommended a referral to Pulmonology to establish care for ongoing management. She states that she had to cancel this appointment and she does not wish to re-schedule or pursue pulmonology evaluation at this time. I will continue to prescribe Albuterol inhaler and have sent a new prescription to her pharmacy today.     Assessment/Plan:   Diagnoses and all orders for this visit:    1. Malignant neoplasm of upper lobe, right bronchus or lung (Primary)  -     CT Chest With Contrast Diagnostic; Future    2. Personal history of radiation therapy    3. Chronic obstructive pulmonary disease, unspecified COPD type  -     albuterol sulfate  (90 Base) MCG/ACT inhaler; Inhale 2 puffs Every 4 (Four) Hours As Needed for Wheezing or Shortness of Air.  Dispense: 18 g; Refill: 2    4. Nicotine dependence, cigarettes, uncomplicated    5. Expiratory wheezing  -     albuterol sulfate  (90 Base) MCG/ACT inhaler; Inhale 2 puffs Every 4 (Four) Hours As Needed for Wheezing or Shortness of Air.  Dispense: 18 g; Refill: 2       Follow Up:   1. Return for follow-up in 6 months with repeat CT scan of the chest with IV contrast.    2. She is a current everyday cigarette smoker. Counseled on smoking cessation. She  verbalizes understanding of need for smoking cessation and is actively decreasing the number of cigarettes per day.      Return in about 6 months (around 10/20/2023) for Office Visit.  Gabriella Jha was encouraged to contact me in the interim with any questions or concerns regarding her care.        AMADOR Rangel  Radiation Oncology  [facility]    This document has been signed by AMADOR Banda on April 20, 2023 14:21 EDT

## 2023-10-27 ENCOUNTER — TELEPHONE (OUTPATIENT)
Dept: GASTROENTEROLOGY | Facility: CLINIC | Age: 63
End: 2023-10-27
Payer: COMMERCIAL

## 2023-10-27 NOTE — TELEPHONE ENCOUNTER
Called Gabriella Jha 1960 to confirm their appointment with AMADOR Carrasco on 10/30/23 at 10am. I was unable to reach the patient to confirm the appointment. The patient has been made aware of our 24 hour cancellation policy.

## 2023-10-31 ENCOUNTER — HOSPITAL ENCOUNTER (OUTPATIENT)
Dept: CT IMAGING | Facility: HOSPITAL | Age: 63
Discharge: HOME | End: 2023-10-31
Admitting: NURSE PRACTITIONER
Payer: COMMERCIAL

## 2023-10-31 DIAGNOSIS — C34.11 MALIGNANT NEOPLASM OF UPPER LOBE, RIGHT BRONCHUS OR LUNG: ICD-10-CM

## 2023-10-31 LAB
CREAT BLDA-MCNC: 0.8 MG/DL
EGFRCR SERPLBLD CKD-EPI 2021: 82.4 ML/MIN/1.73

## 2023-10-31 PROCEDURE — 71260 CT THORAX DX C+: CPT

## 2023-10-31 PROCEDURE — 82565 ASSAY OF CREATININE: CPT

## 2023-10-31 PROCEDURE — 25510000001 IOPAMIDOL PER 1 ML: Performed by: NURSE PRACTITIONER

## 2023-10-31 RX ADMIN — IOPAMIDOL 75 ML: 755 INJECTION, SOLUTION INTRAVENOUS at 16:08

## 2023-11-06 NOTE — PROGRESS NOTES
Follow Up Office Visit      Encounter Date: 11/07/2023   Patient Name: Gabriella Jha  YOB: 1960   Medical Record Number: [id number]   Primary Diagnosis: Malignant neoplasm of upper lobe, right bronchus or lung [C34.11]     Radiation Completion Date:  7/1/2020 ETELVINA SBRT; 10/19/2022 RUL SBRT     Chief Complaint:    Chief Complaint   Patient presents with    Follow-up    Lung Cancer       Oncology/Hematology History   Malignant neoplasm of upper lobe, right bronchus or lung   9/30/2022 Initial Diagnosis    Malignant neoplasm of upper lobe, right bronchus or lung (HCC)     10/10/2022 - 10/19/2022 Radiation    Radiation OncologyTreatment Course:  Gabriella Jha received 5500 cGy in 5 fractions to right upper lobe via SBRT.       History of Present Illness: Gabriella Jha is a 63 y.o. female who returns to [facility] for routine follow-up. Her CT chest with contrast on 10/31/23 shows stable treatment related change in the left perihilar region and stable 8 mm prevascular lymph node. No evidence of metastatic disease. She reports feeling well overall with no new complaints or concerns. She denies any significant changes in her breathing since prior visit. She reports mild shortness of breath with exertion that does not hinder her ADLs. She has occasional cough productive of clear sputum. She denies headaches, hemoptysis, chest tightness, confusion, focal weakness or weight loss. She recently underwent bilateral cataract surgery and her vision has improved. In July 2023 she was seen in the ER for a single episode of rectal bleeding. She has not followed-up with GI and reports that she has not had any recurrent episodes of bleeding. She denies constipation, diarrhea, or anorectal bleeding. She reports undergoing a colonoscopy many years ago.     Subjective      Review of Systems: Review of Systems   Constitutional:  Positive for fatigue (8/10, ongoing). Negative for appetite change and  unexpected weight change.   HENT:  Negative for hearing loss, sore throat, tinnitus, trouble swallowing and voice change.    Eyes:  Negative for visual disturbance.   Respiratory:  Positive for cough (occasional, productive cough with clear secretions.  Ongoing) and wheezing (Occasionally, ongoing). Negative for chest tightness and shortness of breath.    Cardiovascular:  Negative for chest pain, palpitations and leg swelling.   Gastrointestinal:  Negative for anal bleeding, blood in stool, constipation, diarrhea, nausea and vomiting.   Genitourinary:  Negative for difficulty urinating, dysuria, frequency, hematuria and urgency.   Musculoskeletal:  Positive for arthralgias (in hands) and back pain (ongoing). Negative for gait problem and joint swelling.   Skin:  Negative for color change and rash.   Neurological:  Negative for dizziness, weakness and headaches.   Psychiatric/Behavioral:  Negative for sleep disturbance.      The following portions of the patient's history were reviewed and updated as appropriate: allergies, current medications, past family history, past medical history, past social history, past surgical history and problem list.    Medications:     Current Outpatient Medications:     albuterol sulfate  (90 Base) MCG/ACT inhaler, Inhale 2 puffs Every 4 (Four) Hours As Needed for Wheezing or Shortness of Air., Disp: 18 g, Rfl: 4    ARIPiprazole (ABILIFY) 30 MG tablet, Take 1 tablet by mouth Every Night., Disp: , Rfl:     Cholecalciferol (Vitamin D3) 1.25 MG (95931 UT) capsule, Take 1 capsule by mouth Every 7 (Seven) Days.  , Disp: , Rfl:     clonazePAM (KlonoPIN) 1 MG tablet, Take 1 tablet by mouth 2 (Two) Times a Day As Needed., Disp: , Rfl:     clopidogrel (PLAVIX) 75 MG tablet, Take 1 tablet by mouth Daily., Disp: , Rfl:     gabapentin (NEURONTIN) 400 MG capsule, Take 1 capsule by mouth 3 (Three) Times a Day., Disp: , Rfl:     venlafaxine XR (EFFEXOR-XR) 150 MG 24 hr capsule, Take 1 capsule  by mouth Daily., Disp: , Rfl:     atorvastatin (LIPITOR) 20 MG tablet, atorvastatin 20 mg tablet  Take 1 tablet every day by oral route., Disp: , Rfl:     losartan-hydrochlorothiazide (HYZAAR) 100-25 MG per tablet, , Disp: , Rfl:     rosuvastatin (CRESTOR) 10 MG tablet, Take 1 tablet by mouth every night at bedtime., Disp: , Rfl:     Allergies:   Allergies   Allergen Reactions    Clindamycin Hcl GI Intolerance     Patient Smoking History:   Social History     Tobacco Use   Smoking Status Every Day    Packs/day: .25    Types: Cigarettes    Start date: 11/18/1974   Smokeless Tobacco Never     Measures:  PHQ-9 Total Score: 1   Quality of Life: 100 - Full Activity   ECOG score: 0  ECOG: (0) Fully active, able to carry on all predisease performance without restriction  Pain: (on a scale of 0-10)   Pain Score    11/07/23 0835   PainSc: 0-No pain       Objective     Physical Exam:   Vital Signs:   Vitals:    11/07/23 0835   BP: 116/69   Pulse: 82   Resp: 8   Temp: 98.8 °F (37.1 °C)   TempSrc: Temporal   SpO2: 93%   Weight: 54.3 kg (119 lb 11.4 oz)   PainSc: 0-No pain     Body mass index is 22.62 kg/m².   Wt Readings from Last 3 Encounters:   11/07/23 54.3 kg (119 lb 11.4 oz)   07/03/23 55.9 kg (123 lb 3.8 oz)   04/20/23 55.8 kg (123 lb 0.3 oz)       Physical Exam  Vitals reviewed.   Constitutional:       General: She is not in acute distress.     Appearance: Normal appearance. She is normal weight. She is not ill-appearing.   HENT:      Head: Normocephalic and atraumatic.      Mouth/Throat:      Mouth: Mucous membranes are moist.      Pharynx: Oropharynx is clear. No oropharyngeal exudate or posterior oropharyngeal erythema.   Eyes:      Conjunctiva/sclera: Conjunctivae normal.      Pupils: Pupils are equal, round, and reactive to light.   Cardiovascular:      Rate and Rhythm: Normal rate and regular rhythm.      Pulses: Normal pulses.      Heart sounds: Normal heart sounds.   Pulmonary:      Effort: Pulmonary effort is  normal. No respiratory distress.      Breath sounds: No stridor. No wheezing, rhonchi or rales.      Comments: Decreased throughout, no adventitious lung sounds heard  Chest:      Chest wall: No tenderness.   Abdominal:      General: Bowel sounds are normal.      Palpations: Abdomen is soft.   Musculoskeletal:         General: Normal range of motion.      Cervical back: Normal range of motion.   Skin:     General: Skin is warm and dry.   Neurological:      General: No focal deficit present.      Mental Status: She is alert and oriented to person, place, and time. Mental status is at baseline.   Psychiatric:         Mood and Affect: Mood normal.         Behavior: Behavior normal.       Result Review: I independently reviewed the following data. The pertinent findings are as above in the HPI.  -- CT Chest With Contrast Diagnostic (10/31/2023 16:10)     Imaging: CT Chest With Contrast Diagnostic    Result Date: 11/1/2023    1. Stable 8 mm prevascular lymph node.  No new or enlarging mediastinal or hilar nodes. 2. Stable treatment related change in the left perihilar region.  3. No evidence of metastatic disease in chest.     TERE CISNEROS MD       Electronically Signed and Approved By: TERE CISNEROS MD on 11/01/2023 at 0:33               Labs:   WBC   Date Value Ref Range Status   07/03/2023 5.98 3.40 - 10.80 10*3/mm3 Final     Hemoglobin   Date Value Ref Range Status   07/03/2023 11.4 (L) 12.0 - 15.9 g/dL Final     Hematocrit   Date Value Ref Range Status   07/03/2023 34.9 34.0 - 46.6 % Final     Platelets   Date Value Ref Range Status   07/03/2023 326 140 - 450 10*3/mm3 Final     TSH   Date Value Ref Range Status   02/17/2020 2.690 0.270 - 4.200 m[iU]/L Final     Assessment / Plan      Impression: Gabriella Jha is a pleasant 63 y.o. female with history of a PET avid left upper lobe nodule presumed primary lung malignancy in 2020. She was not a surgical candidate and was treated for clinical diagnosis of lung  cancer. She is status post stereotactic body radiotherapy to left upper lobe, completed on 7/1/2020. She had an excellent radiographic response. She subsequently developed a FDG avid right upper lobe lesion consistent with primary lung cancer as per PET scan dated 9/20/2022. She is status post stereotactic body radiotherapy to the right upper lobe, completed on 10/19/2022. Her post-treatment CT scan of the chest without contrast on 1/9/2023 showed good radiographic treatment response as the previously noted noncalcified nodule in the right upper lobe has essentially resolved. There is minimal residual scarring in the right upper lobe consistent with treatment related change. There is stable treatment related fibrosis in the left hilar region consistent with prior radiotherapy. Additionally, the previously noted noncalcified left upper lobe nodule, which was not hypermetabolic on 9/20/2022 PET scan, has also resolved. There is stable prevascular mediastinal lymph node. This showed no evidence of abnormal hypermetabolic activity on prior PET scan dated 9/20/2022. Her CT chest with contrast on 10/31/2023 demonstrates stable treatment related change in the left upper lobe. No change in 8 mm prevascular lymph node and no evidence of metastatic disease within the chest. Will repeat CT chest in 6 months for surveillance.      COPD: patients reports known history of COPD. She has politely declined referral to Pulmonology. I will continue to prescribe Albuterol inhaler and have sent a new prescription to her pharmacy today.     Nicotine dependence: she is a current cigarette smoker. She verbalizes understanding of need for smoking cessation and is actively decreasing her number of cigarettes per day.     In July 2023 she had an isolated episode of rectal bleeding and was evaluated in the ED.  She has not followed-up with GI and reports that she has not had any recurrent episodes of bleeding. She reports undergoing a  colonoscopy many years ago but those records are not available. I recommend she undergo screening colonoscopy and will refer her to Dr. Doan.     Assessment/Plan:   Diagnoses and all orders for this visit:    1. Malignant neoplasm of upper lobe, right bronchus or lung (Primary)  -     albuterol sulfate  (90 Base) MCG/ACT inhaler; Inhale 2 puffs Every 4 (Four) Hours As Needed for Wheezing or Shortness of Air.  Dispense: 18 g; Refill: 4  -     CT Chest With Contrast Diagnostic; Future    2. Personal history of radiation therapy    3. Chronic obstructive pulmonary disease, unspecified COPD type  -     albuterol sulfate  (90 Base) MCG/ACT inhaler; Inhale 2 puffs Every 4 (Four) Hours As Needed for Wheezing or Shortness of Air.  Dispense: 18 g; Refill: 4    4. Nicotine dependence, cigarettes, uncomplicated    5. Encounter for screening colonoscopy  -     Ambulatory Referral to General Surgery       Follow Up:   Return for follow-up in 6 months with CT chest prior. Prescription for albuterol sent to pharmacy.   Referral to Dr. Doan for screening colonoscopy.     Return in about 6 months (around 5/7/2024) for Office Visit.  Gabriella Jha was encouraged to contact me in the interim with any questions or concerns regarding her care.        AMADOR Rangel  Radiation Oncology  [facility]    This document has been signed by AMADOR Banda on November 7, 2023 09:10 EST

## 2023-11-07 ENCOUNTER — OFFICE VISIT (OUTPATIENT)
Dept: RADIATION ONCOLOGY | Facility: HOSPITAL | Age: 63
End: 2023-11-07
Payer: COMMERCIAL

## 2023-11-07 VITALS
WEIGHT: 119.71 LBS | TEMPERATURE: 98.8 F | RESPIRATION RATE: 8 BRPM | OXYGEN SATURATION: 93 % | HEART RATE: 82 BPM | SYSTOLIC BLOOD PRESSURE: 116 MMHG | DIASTOLIC BLOOD PRESSURE: 69 MMHG | BODY MASS INDEX: 22.62 KG/M2

## 2023-11-07 DIAGNOSIS — Z92.3 PERSONAL HISTORY OF RADIATION THERAPY: ICD-10-CM

## 2023-11-07 DIAGNOSIS — Z12.11 ENCOUNTER FOR SCREENING COLONOSCOPY: ICD-10-CM

## 2023-11-07 DIAGNOSIS — C34.11 MALIGNANT NEOPLASM OF UPPER LOBE, RIGHT BRONCHUS OR LUNG: Primary | ICD-10-CM

## 2023-11-07 DIAGNOSIS — F17.210 NICOTINE DEPENDENCE, CIGARETTES, UNCOMPLICATED: ICD-10-CM

## 2023-11-07 DIAGNOSIS — J44.9 CHRONIC OBSTRUCTIVE PULMONARY DISEASE, UNSPECIFIED COPD TYPE: ICD-10-CM

## 2023-11-07 PROCEDURE — G0463 HOSPITAL OUTPT CLINIC VISIT: HCPCS | Performed by: NURSE PRACTITIONER

## 2023-11-07 RX ORDER — ALBUTEROL SULFATE 90 UG/1
2 AEROSOL, METERED RESPIRATORY (INHALATION) EVERY 4 HOURS PRN
Qty: 18 G | Refills: 4 | Status: SHIPPED | OUTPATIENT
Start: 2023-11-07

## 2023-11-13 ENCOUNTER — PREP FOR SURGERY (OUTPATIENT)
Dept: OTHER | Facility: HOSPITAL | Age: 63
End: 2023-11-13
Payer: COMMERCIAL

## 2023-11-13 ENCOUNTER — OFFICE VISIT (OUTPATIENT)
Dept: SURGERY | Facility: CLINIC | Age: 63
End: 2023-11-13
Payer: COMMERCIAL

## 2023-11-13 VITALS
HEIGHT: 61 IN | SYSTOLIC BLOOD PRESSURE: 115 MMHG | WEIGHT: 118 LBS | DIASTOLIC BLOOD PRESSURE: 62 MMHG | BODY MASS INDEX: 22.28 KG/M2

## 2023-11-13 DIAGNOSIS — Z12.11 ENCOUNTER FOR SCREENING COLONOSCOPY: ICD-10-CM

## 2023-11-13 DIAGNOSIS — C34.92 MALIGNANT NEOPLASM OF LEFT LUNG, UNSPECIFIED PART OF LUNG: Primary | ICD-10-CM

## 2023-11-13 PROCEDURE — S0285 CNSLT BEFORE SCREEN COLONOSC: HCPCS | Performed by: SURGERY

## 2023-11-13 NOTE — PROGRESS NOTES
Chief Complaint:  Colonoscopy (consult)    Primary Care Provider: Carolina Parra APRN    Referring Provider: Marcia Pardo APRN (radiation oncology)    History of Present Illness  Gabriella Jha is a 63 y.o. female referred by AMADOR Banda to have a screening colonoscopy.  The patient is being followed in the radiation oncology clinic for lung cancer.  Patient is status post stereotactic body radiotherapy to her left upper lobe with good response.  During her last appointment at the radiation oncology clinic, the patient mentioned that she has not had a colonoscopy in a while so she referred to have a screening colonoscopy.  The patient is scheduled to follow-up in the radiation oncology clinic in May or June 2024.  Patient takes Plavix.  She says it was started about 4 years ago after he had a stroke.  She says no cause for the stroke was ever identified.    Allergies: Clindamycin hcl    Outpatient Medications Marked as Taking for the 11/13/23 encounter (Office Visit) with Kranthi Doan MD   Medication Sig Dispense Refill    albuterol sulfate  (90 Base) MCG/ACT inhaler Inhale 2 puffs Every 4 (Four) Hours As Needed for Wheezing or Shortness of Air. 18 g 4    ARIPiprazole (ABILIFY) 30 MG tablet Take 1 tablet by mouth Every Night.      atorvastatin (LIPITOR) 20 MG tablet atorvastatin 20 mg tablet   Take 1 tablet every day by oral route.      Cholecalciferol (Vitamin D3) 1.25 MG (68215 UT) capsule Take 1 capsule by mouth Every 7 (Seven) Days.        clonazePAM (KlonoPIN) 1 MG tablet Take 1 tablet by mouth 2 (Two) Times a Day As Needed.      clopidogrel (PLAVIX) 75 MG tablet Take 1 tablet by mouth Daily.      gabapentin (NEURONTIN) 400 MG capsule Take 1 capsule by mouth 3 (Three) Times a Day.      losartan-hydrochlorothiazide (HYZAAR) 100-25 MG per tablet       rosuvastatin (CRESTOR) 10 MG tablet Take 1 tablet by mouth every night at bedtime.      venlafaxine XR (EFFEXOR-XR) 150 MG 24 hr capsule Take 1  "capsule by mouth Daily.         Past Medical History:    Anxiety    CHF (congestive heart failure)    COPD (chronic obstructive pulmonary disease)    GERD (gastroesophageal reflux disease)    Hyperlipemia    Hypertension    Lung cancer    Migraines        Past Surgical History:     SECTION    HYSTERECTOMY       Family History: History reviewed. No pertinent family history.     Social History:  Social History     Tobacco Use    Smoking status: Every Day     Packs/day: .25     Types: Cigarettes     Start date: 1974    Smokeless tobacco: Never   Substance Use Topics    Alcohol use: Not Currently       Objective     Vital Signs:  /62 (BP Location: Right arm, Patient Position: Sitting, Cuff Size: Small Adult)   Ht 154.9 cm (61\")   Wt 53.5 kg (118 lb)   BMI 22.30 kg/m²   Respiratory:  breathing not labored, respiratory effort appears normal  Cardiovascular:  heart regular rate  Skin and subcutaneous tissue:  warm and dry, no jaundice  Musculoskeletal: moving all extremities symmetrically and purposefully  Psychiatric:  judgment and insight intact, mood normal      Assessment:  1. Malignant neoplasm of left lung  2. Encounter for screening colonoscopy    Plan:  Colonoscopy    Discussion: Indications, options, risks, benefits, and expected outcomes of planned surgery were discussed with the patient and she agrees to proceed.  Patient was instructed to stop taking Plavix 5 days before the day the colonoscopy will be done.    Kranthi Doan MD  2023    Electronically signed by Kranthi Doan MD, 23, 1:27 PM EST.        "

## 2023-11-28 ENCOUNTER — TELEPHONE (OUTPATIENT)
Dept: PODIATRY | Facility: CLINIC | Age: 63
End: 2023-11-28
Payer: COMMERCIAL

## 2023-11-28 ENCOUNTER — APPOINTMENT (OUTPATIENT)
Dept: CT IMAGING | Facility: HOSPITAL | Age: 63
DRG: 190 | End: 2023-11-28
Payer: COMMERCIAL

## 2023-11-28 ENCOUNTER — HOSPITAL ENCOUNTER (INPATIENT)
Facility: HOSPITAL | Age: 63
LOS: 2 days | Discharge: HOME | DRG: 190 | End: 2023-11-30
Attending: EMERGENCY MEDICINE | Admitting: STUDENT IN AN ORGANIZED HEALTH CARE EDUCATION/TRAINING PROGRAM
Payer: COMMERCIAL

## 2023-11-28 ENCOUNTER — APPOINTMENT (OUTPATIENT)
Dept: GENERAL RADIOLOGY | Facility: HOSPITAL | Age: 63
DRG: 190 | End: 2023-11-28
Payer: COMMERCIAL

## 2023-11-28 DIAGNOSIS — J44.9 CHRONIC OBSTRUCTIVE PULMONARY DISEASE, UNSPECIFIED COPD TYPE: ICD-10-CM

## 2023-11-28 DIAGNOSIS — Z78.9 IMPAIRED MOBILITY AND ADLS: ICD-10-CM

## 2023-11-28 DIAGNOSIS — A48.1 LEGIONELLA PNEUMONIA: ICD-10-CM

## 2023-11-28 DIAGNOSIS — R26.2 DIFFICULTY WALKING: ICD-10-CM

## 2023-11-28 DIAGNOSIS — Z74.09 IMPAIRED MOBILITY AND ADLS: ICD-10-CM

## 2023-11-28 DIAGNOSIS — R09.02 HYPOXIA: Primary | ICD-10-CM

## 2023-11-28 DIAGNOSIS — S92.356A NONDISPLACED FRACTURE OF FIFTH METATARSAL BONE, UNSPECIFIED FOOT, INITIAL ENCOUNTER FOR CLOSED FRACTURE: ICD-10-CM

## 2023-11-28 DIAGNOSIS — R91.1 RIGHT MIDDLE LOBE PULMONARY NODULE: ICD-10-CM

## 2023-11-28 DIAGNOSIS — M79.671 RIGHT FOOT PAIN: ICD-10-CM

## 2023-11-28 DIAGNOSIS — J21.9 BRONCHIOLITIS: ICD-10-CM

## 2023-11-28 DIAGNOSIS — S90.31XA TRAUMATIC ECCHYMOSIS OF RIGHT FOOT, INITIAL ENCOUNTER: ICD-10-CM

## 2023-11-28 LAB
ALBUMIN SERPL-MCNC: 3.3 G/DL (ref 3.5–5.2)
ALBUMIN/GLOB SERPL: 1.1 G/DL
ALP SERPL-CCNC: 80 U/L (ref 39–117)
ALT SERPL W P-5'-P-CCNC: 6 U/L (ref 1–33)
ANION GAP SERPL CALCULATED.3IONS-SCNC: 7.4 MMOL/L (ref 5–15)
ANISOCYTOSIS BLD QL: NORMAL
AST SERPL-CCNC: 14 U/L (ref 1–32)
BASE EXCESS BLDV CALC-SCNC: 3.3 MMOL/L (ref -2–2)
BASOPHILS # BLD AUTO: 0.05 10*3/MM3 (ref 0–0.2)
BASOPHILS NFR BLD AUTO: 0.8 % (ref 0–1.5)
BDY SITE: ABNORMAL
BILIRUB SERPL-MCNC: 0.2 MG/DL (ref 0–1.2)
BUN SERPL-MCNC: 19 MG/DL (ref 8–23)
BUN/CREAT SERPL: 28.4 (ref 7–25)
CA-I BLDA-SCNC: 1.06 MMOL/L (ref 1.13–1.32)
CALCIUM SPEC-SCNC: 8.5 MG/DL (ref 8.6–10.5)
CHLORIDE BLDV-SCNC: 101 MMOL/L (ref 98–106)
CHLORIDE SERPL-SCNC: 100 MMOL/L (ref 98–107)
CO2 SERPL-SCNC: 30.6 MMOL/L (ref 22–29)
COHGB MFR BLD: 16.2 % (ref 0–1.5)
CREAT SERPL-MCNC: 0.67 MG/DL (ref 0.57–1)
D-LACTATE SERPL-SCNC: 0.8 MMOL/L (ref 0.5–2)
DEPRECATED RDW RBC AUTO: 55 FL (ref 37–54)
EGFRCR SERPLBLD CKD-EPI 2021: 97.7 ML/MIN/1.73
EOSINOPHIL # BLD AUTO: 0.14 10*3/MM3 (ref 0–0.4)
EOSINOPHIL NFR BLD AUTO: 2.2 % (ref 0.3–6.2)
ERYTHROCYTE [DISTWIDTH] IN BLOOD BY AUTOMATED COUNT: 19.2 % (ref 12.3–15.4)
FHHB: 7.1 % (ref 0–5)
FLUAV SUBTYP SPEC NAA+PROBE: NOT DETECTED
FLUBV RNA ISLT QL NAA+PROBE: NOT DETECTED
GLOBULIN UR ELPH-MCNC: 3 GM/DL
GLUCOSE BLDA-MCNC: 76 MG/DL (ref 65–99)
GLUCOSE SERPL-MCNC: 80 MG/DL (ref 65–99)
HCO3 BLDV-SCNC: 29.3 MMOL/L (ref 22–26)
HCT VFR BLD AUTO: 36.7 % (ref 34–46.6)
HGB BLD-MCNC: 9.8 G/DL (ref 12–15.9)
HGB BLDA-MCNC: 11.2 G/DL (ref 11.7–14.6)
HYPOCHROMIA BLD QL: NORMAL
IMM GRANULOCYTES # BLD AUTO: 0.02 10*3/MM3 (ref 0–0.05)
IMM GRANULOCYTES NFR BLD AUTO: 0.3 % (ref 0–0.5)
INHALED O2 CONCENTRATION: 21 %
LACTATE BLDA-SCNC: 1.2 MMOL/L (ref 0.5–2)
LARGE PLATELETS: NORMAL
LYMPHOCYTES # BLD AUTO: 0.57 10*3/MM3 (ref 0.7–3.1)
LYMPHOCYTES NFR BLD AUTO: 9.1 % (ref 19.6–45.3)
M PNEUMO IGM SER QL: NEGATIVE
MCH RBC QN AUTO: 21.4 PG (ref 26.6–33)
MCHC RBC AUTO-ENTMCNC: 26.7 G/DL (ref 31.5–35.7)
MCV RBC AUTO: 80.1 FL (ref 79–97)
METHGB BLD QL: 0.3 % (ref 0–1.5)
MICROCYTES BLD QL: NORMAL
MODALITY: ABNORMAL
MONOCYTES # BLD AUTO: 0.59 10*3/MM3 (ref 0.1–0.9)
MONOCYTES NFR BLD AUTO: 9.4 % (ref 5–12)
NEUTROPHILS NFR BLD AUTO: 4.9 10*3/MM3 (ref 1.7–7)
NEUTROPHILS NFR BLD AUTO: 78.2 % (ref 42.7–76)
NOTE: ABNORMAL
NRBC BLD AUTO-RTO: 0.5 /100 WBC (ref 0–0.2)
OXYHGB MFR BLDV: 76.4 % (ref 94–99)
PCO2 BLDV: 51 MM HG (ref 41–51)
PH BLDV: 7.38 PH UNITS (ref 7.31–7.41)
PLATELET # BLD AUTO: 193 10*3/MM3 (ref 140–450)
PMV BLD AUTO: 11.1 FL (ref 6–12)
PO2 BLDV: 61 MM HG (ref 35–42)
POLYCHROMASIA BLD QL SMEAR: NORMAL
POTASSIUM BLDV-SCNC: 4.6 MMOL/L (ref 3.5–5)
POTASSIUM SERPL-SCNC: 3.9 MMOL/L (ref 3.5–5.2)
PROT SERPL-MCNC: 6.3 G/DL (ref 6–8.5)
RBC # BLD AUTO: 4.58 10*6/MM3 (ref 3.77–5.28)
RSV RNA NPH QL NAA+NON-PROBE: NOT DETECTED
SAO2 % BLDCOV: 91.5 % (ref 45–75)
SARS-COV-2 RNA RESP QL NAA+PROBE: NOT DETECTED
SMALL PLATELETS BLD QL SMEAR: ADEQUATE
SODIUM BLDV-SCNC: 137.4 MMOL/L (ref 136–146)
SODIUM SERPL-SCNC: 138 MMOL/L (ref 136–145)
WBC MORPH BLD: NORMAL
WBC NRBC COR # BLD AUTO: 6.27 10*3/MM3 (ref 3.4–10.8)

## 2023-11-28 PROCEDURE — 25010000002 METHYLPREDNISOLONE PER 40 MG: Performed by: STUDENT IN AN ORGANIZED HEALTH CARE EDUCATION/TRAINING PROGRAM

## 2023-11-28 PROCEDURE — 25010000002 CEFTRIAXONE PER 250 MG: Performed by: STUDENT IN AN ORGANIZED HEALTH CARE EDUCATION/TRAINING PROGRAM

## 2023-11-28 PROCEDURE — 73630 X-RAY EXAM OF FOOT: CPT

## 2023-11-28 PROCEDURE — 83605 ASSAY OF LACTIC ACID: CPT | Performed by: STUDENT IN AN ORGANIZED HEALTH CARE EDUCATION/TRAINING PROGRAM

## 2023-11-28 PROCEDURE — 86738 MYCOPLASMA ANTIBODY: CPT | Performed by: STUDENT IN AN ORGANIZED HEALTH CARE EDUCATION/TRAINING PROGRAM

## 2023-11-28 PROCEDURE — 85025 COMPLETE CBC W/AUTO DIFF WBC: CPT

## 2023-11-28 PROCEDURE — 25510000001 IOPAMIDOL PER 1 ML: Performed by: EMERGENCY MEDICINE

## 2023-11-28 PROCEDURE — 94799 UNLISTED PULMONARY SVC/PX: CPT

## 2023-11-28 PROCEDURE — 94761 N-INVAS EAR/PLS OXIMETRY MLT: CPT

## 2023-11-28 PROCEDURE — 25010000002 METHYLPREDNISOLONE PER 125 MG: Performed by: EMERGENCY MEDICINE

## 2023-11-28 PROCEDURE — 87040 BLOOD CULTURE FOR BACTERIA: CPT | Performed by: STUDENT IN AN ORGANIZED HEALTH CARE EDUCATION/TRAINING PROGRAM

## 2023-11-28 PROCEDURE — 36415 COLL VENOUS BLD VENIPUNCTURE: CPT

## 2023-11-28 PROCEDURE — 94640 AIRWAY INHALATION TREATMENT: CPT

## 2023-11-28 PROCEDURE — 87637 SARSCOV2&INF A&B&RSV AMP PRB: CPT

## 2023-11-28 PROCEDURE — 71045 X-RAY EXAM CHEST 1 VIEW: CPT

## 2023-11-28 PROCEDURE — 80053 COMPREHEN METABOLIC PANEL: CPT

## 2023-11-28 PROCEDURE — 82820 HEMOGLOBIN-OXYGEN AFFINITY: CPT

## 2023-11-28 PROCEDURE — 85007 BL SMEAR W/DIFF WBC COUNT: CPT

## 2023-11-28 PROCEDURE — 82805 BLOOD GASES W/O2 SATURATION: CPT

## 2023-11-28 PROCEDURE — 99285 EMERGENCY DEPT VISIT HI MDM: CPT

## 2023-11-28 PROCEDURE — 99222 1ST HOSP IP/OBS MODERATE 55: CPT | Performed by: STUDENT IN AN ORGANIZED HEALTH CARE EDUCATION/TRAINING PROGRAM

## 2023-11-28 PROCEDURE — 71260 CT THORAX DX C+: CPT

## 2023-11-28 RX ORDER — HEPARIN SODIUM 5000 [USP'U]/ML
5000 INJECTION, SOLUTION INTRAVENOUS; SUBCUTANEOUS EVERY 12 HOURS SCHEDULED
Status: DISCONTINUED | OUTPATIENT
Start: 2023-11-28 | End: 2023-11-30 | Stop reason: HOSPADM

## 2023-11-28 RX ORDER — BISACODYL 10 MG
10 SUPPOSITORY, RECTAL RECTAL DAILY PRN
Status: DISCONTINUED | OUTPATIENT
Start: 2023-11-28 | End: 2023-11-30 | Stop reason: HOSPADM

## 2023-11-28 RX ORDER — BISACODYL 5 MG/1
5 TABLET, DELAYED RELEASE ORAL DAILY PRN
Status: DISCONTINUED | OUTPATIENT
Start: 2023-11-28 | End: 2023-11-30 | Stop reason: HOSPADM

## 2023-11-28 RX ORDER — SODIUM CHLORIDE 0.9 % (FLUSH) 0.9 %
10 SYRINGE (ML) INJECTION AS NEEDED
Status: DISCONTINUED | OUTPATIENT
Start: 2023-11-28 | End: 2023-11-30 | Stop reason: HOSPADM

## 2023-11-28 RX ORDER — NITROGLYCERIN 0.4 MG/1
0.4 TABLET SUBLINGUAL
Status: DISCONTINUED | OUTPATIENT
Start: 2023-11-28 | End: 2023-11-29

## 2023-11-28 RX ORDER — IPRATROPIUM BROMIDE AND ALBUTEROL SULFATE 2.5; .5 MG/3ML; MG/3ML
3 SOLUTION RESPIRATORY (INHALATION)
Status: DISCONTINUED | OUTPATIENT
Start: 2023-11-28 | End: 2023-11-30 | Stop reason: HOSPADM

## 2023-11-28 RX ORDER — POLYETHYLENE GLYCOL 3350 17 G/17G
17 POWDER, FOR SOLUTION ORAL DAILY PRN
Status: DISCONTINUED | OUTPATIENT
Start: 2023-11-28 | End: 2023-11-30 | Stop reason: HOSPADM

## 2023-11-28 RX ORDER — AMOXICILLIN 250 MG
2 CAPSULE ORAL 2 TIMES DAILY
Status: DISCONTINUED | OUTPATIENT
Start: 2023-11-28 | End: 2023-11-30 | Stop reason: HOSPADM

## 2023-11-28 RX ORDER — IPRATROPIUM BROMIDE AND ALBUTEROL SULFATE 2.5; .5 MG/3ML; MG/3ML
3 SOLUTION RESPIRATORY (INHALATION)
Status: COMPLETED | OUTPATIENT
Start: 2023-11-28 | End: 2023-11-28

## 2023-11-28 RX ORDER — METHYLPREDNISOLONE SODIUM SUCCINATE 125 MG/2ML
125 INJECTION, POWDER, LYOPHILIZED, FOR SOLUTION INTRAMUSCULAR; INTRAVENOUS ONCE
Status: COMPLETED | OUTPATIENT
Start: 2023-11-28 | End: 2023-11-28

## 2023-11-28 RX ORDER — METHYLPREDNISOLONE SODIUM SUCCINATE 125 MG/2ML
125 INJECTION, POWDER, LYOPHILIZED, FOR SOLUTION INTRAMUSCULAR; INTRAVENOUS ONCE
Status: DISCONTINUED | OUTPATIENT
Start: 2023-11-28 | End: 2023-11-28

## 2023-11-28 RX ORDER — SODIUM CHLORIDE 0.9 % (FLUSH) 0.9 %
10 SYRINGE (ML) INJECTION EVERY 12 HOURS SCHEDULED
Status: DISCONTINUED | OUTPATIENT
Start: 2023-11-28 | End: 2023-11-30 | Stop reason: HOSPADM

## 2023-11-28 RX ORDER — METHYLPREDNISOLONE SODIUM SUCCINATE 40 MG/ML
40 INJECTION, POWDER, LYOPHILIZED, FOR SOLUTION INTRAMUSCULAR; INTRAVENOUS EVERY 8 HOURS
Status: DISCONTINUED | OUTPATIENT
Start: 2023-11-28 | End: 2023-11-30

## 2023-11-28 RX ORDER — SODIUM CHLORIDE 9 MG/ML
40 INJECTION, SOLUTION INTRAVENOUS AS NEEDED
Status: DISCONTINUED | OUTPATIENT
Start: 2023-11-28 | End: 2023-11-30 | Stop reason: HOSPADM

## 2023-11-28 RX ORDER — CEFTRIAXONE SODIUM 1 G/50ML
1000 INJECTION, SOLUTION INTRAVENOUS EVERY 24 HOURS
Status: DISCONTINUED | OUTPATIENT
Start: 2023-11-28 | End: 2023-11-30

## 2023-11-28 RX ORDER — ACETAMINOPHEN 325 MG/1
650 TABLET ORAL EVERY 6 HOURS PRN
Status: DISCONTINUED | OUTPATIENT
Start: 2023-11-28 | End: 2023-11-30 | Stop reason: HOSPADM

## 2023-11-28 RX ADMIN — CEFTRIAXONE SODIUM 1000 MG: 1 INJECTION, SOLUTION INTRAVENOUS at 21:41

## 2023-11-28 RX ADMIN — IPRATROPIUM BROMIDE AND ALBUTEROL SULFATE 3 ML: .5; 3 SOLUTION RESPIRATORY (INHALATION) at 15:55

## 2023-11-28 RX ADMIN — METHYLPREDNISOLONE SODIUM SUCCINATE 125 MG: 125 INJECTION INTRAMUSCULAR; INTRAVENOUS at 18:50

## 2023-11-28 RX ADMIN — DOXYCYCLINE 100 MG: 100 INJECTION, POWDER, LYOPHILIZED, FOR SOLUTION INTRAVENOUS at 22:33

## 2023-11-28 RX ADMIN — IPRATROPIUM BROMIDE AND ALBUTEROL SULFATE 3 ML: .5; 3 SOLUTION RESPIRATORY (INHALATION) at 16:00

## 2023-11-28 RX ADMIN — METHYLPREDNISOLONE SODIUM SUCCINATE 40 MG: 40 INJECTION INTRAMUSCULAR; INTRAVENOUS at 21:38

## 2023-11-28 RX ADMIN — IPRATROPIUM BROMIDE AND ALBUTEROL SULFATE 3 ML: .5; 3 SOLUTION RESPIRATORY (INHALATION) at 23:19

## 2023-11-28 RX ADMIN — IOPAMIDOL 59 ML: 755 INJECTION, SOLUTION INTRAVENOUS at 19:30

## 2023-11-28 RX ADMIN — IPRATROPIUM BROMIDE AND ALBUTEROL SULFATE 3 ML: .5; 3 SOLUTION RESPIRATORY (INHALATION) at 16:05

## 2023-11-28 NOTE — PROCEDURES
Patient started walking oximetry on room air at 91% and heart rate 96. At one minute walking patient was still at 90%. At two minutes, patient began to drop below 88%. 1L was added and she was still dropping to 74%. Oxygen was then increased to 2L and she was still at 77%. When 3L was administered she was at 80%. Continuing to walk with no distress, she was at 77% so I increased to 4L. At one minute after oxygen was administed she was at 6L at 79%. She continued to walk with no tachycardia, chest pain, dizziness or respiratory distress on 6L oxygen at 91% at two minutes. At three minutes she was 91%. Four minutes 96%. Five minutes 95%. After walking six minutes on 6L of oxygen she was at 95%.

## 2023-11-28 NOTE — ED PROVIDER NOTES
Time: 3:07 PM EST  Date of encounter:  2023  Room 26  Independent Historian/Clinical History and Information was obtained by:   Patient    History is limited by: N/A    Chief Complaint: right foot pain      History of Present Illness:  Patient is a 63 y.o. year old female who presents to the emergency department for evaluation of right foot pain.  Patient reports falling 2 days ago and twisting her right ankle.  She presented to local urgent care center for her pain and discomfort however when they obtained triage vital signs they were unable to obtain a pulse oximetry reading.  She was referred here to the ER for poor oxygenation as well as her foot pain.  Patient has a history of COPD but denies any shortness of air at this time and states she does not feel any different than normally as far as respiratory.  She rates her foot pain as a 10.    HPI    Patient Care Team  Primary Care Provider: Carolina Parra APRN    Past Medical History:     Allergies   Allergen Reactions    Clindamycin Hcl GI Intolerance     Past Medical History:   Diagnosis Date    Anxiety     CHF (congestive heart failure)     COPD (chronic obstructive pulmonary disease)     GERD (gastroesophageal reflux disease)     Hyperlipemia     Hypertension     Lung cancer     Migraines      Past Surgical History:   Procedure Laterality Date     SECTION      HYSTERECTOMY       History reviewed. No pertinent family history.    Home Medications:  Prior to Admission medications    Medication Sig Start Date End Date Taking? Authorizing Provider   albuterol sulfate  (90 Base) MCG/ACT inhaler Inhale 2 puffs Every 4 (Four) Hours As Needed for Wheezing or Shortness of Air. 23   Marcia Pardo APRN   ARIPiprazole (ABILIFY) 30 MG tablet Take 1 tablet by mouth Every Night. 9/10/21   Estefany Washington MD   atorvastatin (LIPITOR) 20 MG tablet atorvastatin 20 mg tablet   Take 1 tablet every day by oral route.    Estefany Washington MD    Cholecalciferol (Vitamin D3) 1.25 MG (52847 UT) capsule Take 1 capsule by mouth Every 7 (Seven) Days.   6/28/22   Estefany Washington MD   clonazePAM (KlonoPIN) 1 MG tablet Take 1 tablet by mouth 2 (Two) Times a Day As Needed.    Estefany Washington MD   clopidogrel (PLAVIX) 75 MG tablet Take 1 tablet by mouth Daily. 8/24/21   Estefany Washington MD   gabapentin (NEURONTIN) 400 MG capsule Take 1 capsule by mouth 3 (Three) Times a Day. 11/17/21   Estefany Washington MD   losartan (COZAAR) 50 MG tablet Take 1 tablet by mouth Daily.    Estefany Washington MD   losartan-hydrochlorothiazide (HYZAAR) 100-25 MG per tablet  9/3/21   Estefany Washington MD   rosuvastatin (CRESTOR) 10 MG tablet Take 1 tablet by mouth every night at bedtime. 6/23/22   Estefany Washington MD   venlafaxine XR (EFFEXOR-XR) 150 MG 24 hr capsule Take 1 capsule by mouth Daily. 9/3/21   Estefany Washington MD        Social History:   Social History     Tobacco Use    Smoking status: Every Day     Packs/day: .25     Types: Cigarettes     Start date: 11/18/1974    Smokeless tobacco: Never   Vaping Use    Vaping Use: Never used   Substance Use Topics    Alcohol use: Not Currently    Drug use: Never         Review of Systems:  Review of Systems   Constitutional:  Negative for chills and fever.   HENT:  Negative for congestion, ear pain and sore throat.    Eyes:  Negative for pain.   Respiratory:  Negative for cough, chest tightness and shortness of breath.    Cardiovascular:  Negative for chest pain.   Gastrointestinal:  Negative for abdominal pain, diarrhea, nausea and vomiting.   Genitourinary:  Negative for flank pain and hematuria.   Musculoskeletal:  Positive for arthralgias (right foot pain). Negative for joint swelling.   Skin:  Negative for pallor.   Neurological:  Negative for seizures and headaches.   All other systems reviewed and are negative.       Physical Exam:  /74 (BP Location: Left arm, Patient Position: Lying)   " Pulse 98   Temp 98.4 °F (36.9 °C) (Oral)   Resp 18   Ht 154.9 cm (61\")   Wt 54.1 kg (119 lb 4.3 oz)   SpO2 90%   BMI 22.54 kg/m²     Physical Exam  Vitals and nursing note reviewed.   Constitutional:       General: She is not in acute distress.     Appearance: Normal appearance. She is not ill-appearing, toxic-appearing or diaphoretic.   HENT:      Head: Normocephalic and atraumatic.      Mouth/Throat:      Mouth: Mucous membranes are moist.   Eyes:      General: No scleral icterus.  Cardiovascular:      Rate and Rhythm: Normal rate and regular rhythm.      Pulses: Normal pulses.      Heart sounds: Normal heart sounds.   Pulmonary:      Effort: Tachypnea and accessory muscle usage (mild accessary muscle use noted.) present. No bradypnea or respiratory distress.      Breath sounds: Examination of the right-upper field reveals wheezing. Examination of the left-upper field reveals wheezing. Decreased breath sounds and wheezing present.      Comments: Pt denies any SOA and states she feels that same as she always does.   Abdominal:      General: Abdomen is flat.      Palpations: Abdomen is soft.      Tenderness: There is no abdominal tenderness.   Musculoskeletal:         General: Normal range of motion.      Cervical back: Normal range of motion and neck supple.      Right lower leg: No tenderness. No edema.      Left lower leg: No tenderness. No edema.   Skin:     General: Skin is warm and dry.   Neurological:      Mental Status: She is alert and oriented to person, place, and time. Mental status is at baseline.                Procedures:  Procedures      Medical Decision Making:      Comorbidities that affect care:    Cancer, COPD    External Notes reviewed:    Previous Clinic Note: 11/28/2023 and Previous Radiological Studies: 11/7/2023      The following orders were placed and all results were independently analyzed by me:  Orders Placed This Encounter   Procedures    COVID PRE-OP / PRE-PROCEDURE SCREENING " ORDER (NO ISOLATION) - Swab, Nasopharynx    COVID-19, FLU A/B, RSV PCR 1 HR TAT - Swab, Nasopharynx    Blood Culture - Blood,    Blood Culture - Blood,    Legionella Antigen, Urine - Urine, Urine, Clean Catch    MRSA Screen, PCR (Inpatient) - Swab, Nares    Mycoplasma Pneumoniae Antibody, IgM - Blood, Arm, Left    Respiratory Culture - Sputum, Cough    S. Pneumo Ag Urine or CSF - Urine, Urine, Clean Catch    XR Foot 3+ View Right    XR Chest 1 View    CT Chest With Contrast Diagnostic    Comprehensive Metabolic Panel    VBG with Co-Ox and Electrolytes    CBC Auto Differential    Scan Slide    Basic Metabolic Panel    Lactic Acid, Plasma    CBC Auto Differential    Procalcitonin    Iron Profile    Occult Blood, Fecal By Immunoassay - Stool, Per Rectum    Ambulatory Referral to Pulmonary/COPD Clinic    Diet: Regular/House Diet; Texture: Regular Texture (IDDSI 7); Fluid Consistency: Thin (IDDSI 0)    If unable to obtain O2 saturation reading with pulse ox VBG  Misc Nursing Order (Specify)    Check Pulse Oximetry while ambulating    Obtain & Apply The Following- Lower extremity; Walking boot    Obtain & Apply The Following- Lower extremity; Walking boot    Vital Signs    Intake & Output    Weigh Patient    Oral Care    Code Status and Medical Interventions:    IP General Consult (Use specialty-specific consult if known)    Inpatient RT Case Management Consult    Inpatient Pulmonary Rehab Consult    OT Consult: Eval & Treat    PT Consult: Eval & Treat Discharge Placement Assessment    RT to Initiate Bronchodilator Protocol    RT to Initiate Bronchopulmonary Hygiene Protocol    Oscillating Positive Expiratory Pressure (OPEP)    Walking Oximetry    Insert Peripheral IV    Inpatient Admission    CBC & Differential    CBC & Differential       Medications Given in the Emergency Department:  Medications   sodium chloride 0.9 % flush 10 mL (10 mL Intravenous Given 11/29/23 0853)   sodium chloride 0.9 % flush 10 mL (has no  administration in time range)   sodium chloride 0.9 % infusion 40 mL (has no administration in time range)   sennosides-docusate (PERICOLACE) 8.6-50 MG per tablet 2 tablet (2 tablets Oral Given 11/29/23 0853)     And   polyethylene glycol (MIRALAX) packet 17 g (has no administration in time range)     And   bisacodyl (DULCOLAX) EC tablet 5 mg (has no administration in time range)     And   bisacodyl (DULCOLAX) suppository 10 mg (has no administration in time range)   heparin (porcine) 5000 UNIT/ML injection 5,000 Units (5,000 Units Subcutaneous Given 11/29/23 0853)   methylPREDNISolone sodium succinate (SOLU-Medrol) injection 40 mg (40 mg Intravenous Given 11/29/23 1246)   ipratropium-albuterol (DUO-NEB) nebulizer solution 3 mL (3 mL Nebulization Given 11/29/23 1208)   cefTRIAXone (ROCEPHIN) IVPB 1,000 mg (0 mg Intravenous Stopped 11/28/23 2233)   acetaminophen (TYLENOL) tablet 650 mg (has no administration in time range)   !Patient Home Medications Stored in Pharmacy ( Does not apply Not Given 11/29/23 1003)   azithromycin (ZITHROMAX) tablet 500 mg (500 mg Oral Given 11/29/23 1041)     Followed by   azithromycin (ZITHROMAX) tablet 250 mg (has no administration in time range)   budesonide (PULMICORT) nebulizer solution 0.5 mg (0.5 mg Nebulization Given 11/29/23 1011)   arformoterol (BROVANA) nebulizer solution 15 mcg (15 mcg Nebulization Given 11/29/23 1011)   famotidine (PEPCID) tablet 20 mg (has no administration in time range)   ARIPiprazole (ABILIFY) tablet 30 mg (has no administration in time range)   rosuvastatin (CRESTOR) tablet 10 mg (has no administration in time range)   venlafaxine XR (EFFEXOR-XR) 24 hr capsule 150 mg (150 mg Oral Given 11/29/23 1041)   clonazePAM (KlonoPIN) tablet 1 mg (has no administration in time range)   nicotine (NICODERM CQ) 21 MG/24HR patch 1 patch (1 patch Transdermal Medication Applied 11/29/23 1528)   clopidogrel (PLAVIX) tablet 75 mg (75 mg Oral Given 11/29/23 1528)    gabapentin (NEURONTIN) capsule 400 mg (400 mg Oral Given 11/29/23 1528)   ipratropium-albuterol (DUO-NEB) nebulizer solution 3 mL (3 mL Nebulization Given 11/28/23 1605)   methylPREDNISolone sodium succinate (SOLU-Medrol) injection 125 mg (125 mg Intravenous Given 11/28/23 1850)   iopamidol (ISOVUE-370) 76 % injection 100 mL (59 mL Intravenous Given 11/28/23 1930)        ED Course:    ED Course as of 11/29/23 1601   Tue Nov 28, 2023   1531 Primary RN unable to obtain O2 saturation via  pulse.  Multiple attempts made even after removing fingernail polish.  VBG to be obtained.  Patient states she used to wear home O2 approximately 5 years ago however stopped using it stating that she was instructed by provider that she no longer needed it.  She does have a history of COPD and continues to smoke approximately 10 cigarettes a day [MS]   1637 Updated pt with results. She continues to deny any SOA and states she does not feel any tightness in her chest. She denies any change in her breathing or respiratory status. Her O2 sats are 98% on  [MS]   1811 I have discussed this pt with ER attending Dr. Soares  [MS]   1820 Patient's O2 saturation was assessed while ambulating by respiratory therapist.  Patient has significant drop at room air decreasing up to 74%.  RT placed her on 2 L which had to continuously be increased to 6 L/min via nasal cannula for patient to region O2 saturation of 91%.  Note, once patient was placed on 6 L it still took approximately 2 minutes for her oxygen saturation to increase to 91%. [MS]   2010 Order placed for hospital consult at this time.  [MS]   2014 XR Foot 3+ View Right  Has an appointment with Dr. Kelsey for 1/8/2024 but has called to see if she can have the appointment moved up.  [MS]   2032 Dr. Evans has agreed to admit the pt to hospital for hypoxia.  [MS]      ED Course User Index  [MS] Margo Samson, AMADOR       Labs:    Lab Results (last 24 hours)       Procedure  Component Value Units Date/Time    CBC & Differential [[id number]]  (Abnormal) Collected: 11/28/23 1645    Specimen: Blood Updated: 11/28/23 1709    Narrative:      The following orders were created for panel order CBC & Differential.  Procedure                               Abnormality         Status                     ---------                               -----------         ------                     CBC Auto Differential[[id number]]        Abnormal            Final result               Scan Slide[[id number]]                                       Final result                 Please view results for these tests on the individual orders.    CBC Auto Differential [[id number]]  (Abnormal) Collected: 11/28/23 1645    Specimen: Blood Updated: 11/28/23 1709     WBC 6.27 10*3/mm3      RBC 4.58 10*6/mm3      Hemoglobin 9.8 g/dL      Hematocrit 36.7 %      MCV 80.1 fL      MCH 21.4 pg      MCHC 26.7 g/dL      RDW 19.2 %      RDW-SD 55.0 fl      MPV 11.1 fL      Platelets 193 10*3/mm3      Neutrophil % 78.2 %      Lymphocyte % 9.1 %      Monocyte % 9.4 %      Eosinophil % 2.2 %      Basophil % 0.8 %      Immature Grans % 0.3 %      Neutrophils, Absolute 4.90 10*3/mm3      Lymphocytes, Absolute 0.57 10*3/mm3      Monocytes, Absolute 0.59 10*3/mm3      Eosinophils, Absolute 0.14 10*3/mm3      Basophils, Absolute 0.05 10*3/mm3      Immature Grans, Absolute 0.02 10*3/mm3      nRBC 0.5 /100 WBC     Scan Slide [[id number]] Collected: 11/28/23 1645    Specimen: Blood Updated: 11/28/23 1709     Anisocytosis Slight/1+     Hypochromia Mod/2+     Microcytes Mod/2+     Polychromasia Slight/1+     WBC Morphology Normal     Platelet Estimate Adequate     Large Platelets Slight/1+    Comprehensive Metabolic Panel [[id number]]  (Abnormal) Collected: 11/28/23 1719    Specimen: Blood from Arm, Right Updated: 11/28/23 1752     Glucose 80 mg/dL      BUN 19 mg/dL      Creatinine 0.67 mg/dL      Sodium 138 mmol/L      Potassium 3.9 mmol/L       Chloride 100 mmol/L      CO2 30.6 mmol/L      Calcium 8.5 mg/dL      Total Protein 6.3 g/dL      Albumin 3.3 g/dL      ALT (SGPT) 6 U/L      AST (SGOT) 14 U/L      Alkaline Phosphatase 80 U/L      Total Bilirubin 0.2 mg/dL      Globulin 3.0 gm/dL      A/G Ratio 1.1 g/dL      BUN/Creatinine Ratio 28.4     Anion Gap 7.4 mmol/L      eGFR 97.7 mL/min/1.73     Narrative:      GFR Normal >60  Chronic Kidney Disease <60  Kidney Failure <15      Lactic Acid, Plasma [[id number]]  (Normal) Collected: 11/28/23 2123    Specimen: Blood Updated: 11/28/23 2157     Lactate 0.8 mmol/L     Blood Culture - Blood, Arm, Right [[id number]] Collected: 11/28/23 2123    Specimen: Blood from Arm, Right Updated: 11/28/23 2128    Blood Culture - Blood, Arm, Right [[id number]] Collected: 11/28/23 2140    Specimen: Blood from Arm, Right Updated: 11/28/23 2146    Mycoplasma Pneumoniae Antibody, IgM - Blood, Arm, Left [[id number]]  (Normal) Collected: 11/28/23 2240    Specimen: Blood from Arm, Left Updated: 11/28/23 2328     Mycoplasma pneumo IgM Negative    MRSA Screen, PCR (Inpatient) - Swab, Nares [[id number]]  (Normal) Collected: 11/29/23 0101    Specimen: Swab from Nares Updated: 11/29/23 0248     MRSA PCR No MRSA Detected    Narrative:      The negative predictive value of this diagnostic test is high and should only be used to consider de-escalating anti-MRSA therapy. A positive result may indicate colonization with MRSA and must be correlated clinically.    Basic Metabolic Panel [[id number]]  (Abnormal) Collected: 11/29/23 0611    Specimen: Blood from Hand, Right Updated: 11/29/23 0704     Glucose 165 mg/dL      BUN 16 mg/dL      Creatinine 0.63 mg/dL      Sodium 138 mmol/L      Potassium 4.2 mmol/L      Chloride 100 mmol/L      CO2 31.7 mmol/L      Calcium 8.3 mg/dL      BUN/Creatinine Ratio 25.4     Anion Gap 6.3 mmol/L      eGFR 99.2 mL/min/1.73     Narrative:      GFR Normal >60  Chronic Kidney Disease <60  Kidney Failure <15      CBC &  Differential [[id number]]  (Abnormal) Collected: 11/29/23 0611    Specimen: Blood from Hand, Right Updated: 11/29/23 0627    Narrative:      The following orders were created for panel order CBC & Differential.  Procedure                               Abnormality         Status                     ---------                               -----------         ------                     CBC Auto Differential[[id number]]        Abnormal            Final result                 Please view results for these tests on the individual orders.    CBC Auto Differential [[id number]]  (Abnormal) Collected: 11/29/23 0611    Specimen: Blood from Hand, Right Updated: 11/29/23 0627     WBC 4.93 10*3/mm3      RBC 4.59 10*6/mm3      Hemoglobin 9.6 g/dL      Hematocrit 37.2 %      MCV 81.0 fL      MCH 20.9 pg      MCHC 25.8 g/dL      RDW 18.9 %      RDW-SD 55.6 fl      MPV 10.5 fL      Platelets 178 10*3/mm3      Neutrophil % 91.9 %      Lymphocyte % 4.5 %      Monocyte % 2.2 %      Eosinophil % 0.0 %      Basophil % 0.2 %      Immature Grans % 1.2 %      Neutrophils, Absolute 4.53 10*3/mm3      Lymphocytes, Absolute 0.22 10*3/mm3      Monocytes, Absolute 0.11 10*3/mm3      Eosinophils, Absolute 0.00 10*3/mm3      Basophils, Absolute 0.01 10*3/mm3      Immature Grans, Absolute 0.06 10*3/mm3      nRBC 1.4 /100 WBC     Procalcitonin [[id number]]  (Normal) Collected: 11/29/23 0611    Specimen: Blood from Hand, Right Updated: 11/29/23 0957     Procalcitonin 0.10 ng/mL     Narrative:      As a Marker for Sepsis (Non-Neonates):    1. <0.5 ng/mL represents a low risk of severe sepsis and/or septic shock.  2. >2 ng/mL represents a high risk of severe sepsis and/or septic shock.    As a Marker for Lower Respiratory Tract Infections that require antibiotic therapy:    PCT on Admission    Antibiotic Therapy       6-12 Hrs later    >0.5                Strongly Recommended  >0.25 - <0.5        Recommended  0.1 - 0.25          Discouraged               "Remeasure/reassess PCT  <0.1                Strongly Discouraged     Remeasure/reassess PCT    As 28 day mortality risk marker: \"Change in Procalcitonin Result\" (>80% or <=80%) if Day 0 (or Day 1) and Day 4 values are available. Refer to http://www.[facility]-pct-calculator.com    Change in PCT <=80%  A decrease of PCT levels below or equal to 80% defines a positive change in PCT test result representing a higher risk for 28-day all-cause mortality of patients diagnosed with severe sepsis for septic shock.    Change in PCT >80%  A decrease of PCT levels of more than 80% defines a negative change in PCT result representing a lower risk for 28-day all-cause mortality of patients diagnosed with severe sepsis or septic shock.    This test is Prognostic not Diagnostic, if elevated correlate with clinical findings before administering antibiotic treatment.                 Imaging:    CT Chest With Contrast Diagnostic    Result Date: 11/28/2023  PROCEDURE: CT CHEST W CONTRAST DIAGNOSTIC  COMPARISON:  None INDICATIONS: LOW OXYGEN, PT DENIES SHORTNESS OF BREATH. r/o PE  TECHNIQUE: After obtaining the patient's consent, CT images were obtained with non-ionic intravenous contrast material.   PROTOCOL:   Pulmonary embolism imaging protocol performed    RADIATION:   DLP: 246.5 mGy*cm   Automated exposure control was utilized to minimize radiation dose. CONTRAST: 59 cc Isovue 370 I.V. LABS:   eGFR: 97.7 ml/min/1.73m2  FINDINGS:  Pulmonary arteries:  Adequately opacified.  No emboli demonstrated  Sonal/mediastinum:  Interval pre-vascular lymph node enlargement, now 1.1 cm, was 8 mm.  Interval bilateral hilar lymph node enlargement.  No pericardial effusion.  Coronary artery calcification  Lungs/pleura:  Stable left perihilar fibrosis with scarring radiating into the left upper lobe.  Bilateral bronchial wall thickening.  Prominent tree-in-bud opacities in the right lower lobe, with scattered tree-in-bud opacities elsewhere in the " lungs.  Strandy airspace disease in the lateral right lung base that likely represents atelectasis.  5 mm nodule in the medial right middle lobe.  No pleural effusion  Upper abdomen:  No acute abnormality.  Cysts in the liver  Bones/soft tissues:  No acute bony abnormality.         1. No evidence of pulmonary embolism  2. Findings of bronchitis, with bronchiolitis changes that are most pronounced in the right lower lobe  3. Pre-vascular and bilateral hilar lymph node enlargement which may be reactive or metastatic, recommend attention on follow-up  4. 5 mm nodule in the medial right middle lobe that is probably stable     EDY WOODY MD       Electronically Signed and Approved By: EDY WOODY MD on 11/28/2023 at 19:48                Differential Diagnosis and Discussion:    Dyspnea: Differential diagnosis includes but is not limited to metabolic acidosis, neurological disorders, psychogenic, asthma, pneumothorax, upper airway obstruction, COPD, pneumonia, noncardiogenic pulmonary edema, interstitial lung disease, anemia, congestive heart failure, and pulmonary embolism  Extremity Pain: Differential diagnosis includes but is not limited to soft tissue sprain, tendonitis, tendon injury, dislocation, fracture, deep vein thrombosis, arterial insufficiency, osteoarthritis, bursitis, and ligamentous damage.    All labs were reviewed and interpreted by me.  All X-rays impressions were independently interpreted by me.  CT scan radiology impression was interpreted by me.    MDM     Amount and/or Complexity of Data Reviewed  Clinical lab tests: reviewed and ordered  Tests in the radiology section of CPT®: reviewed and ordered  Decide to obtain previous medical records or to obtain history from someone other than the patient: yes  Review and summarize past medical records: yes (I have personally reviewed patient's previous medical encounters.  )    Risk of Complications, Morbidity, and/or Mortality  Presenting problems:  high  Diagnostic procedures: moderate  Management options: moderate                 Patient Care Considerations:    ANTIBIOTICS: I considered prescribing antibiotics as an outpatient however patient has no indications of a bacterial infection including pneumonia.  She is afebrile and white blood cell count is within normal limits.      Consultants/Shared Management Plan:    Hospitalist: I have discussed the case with Dr. Alonzo who agrees to accept the patient for admission.    Social Determinants of Health:    Patient is independent, reliable, and has access to care.       Disposition and Care Coordination:    Admit:   Through independent evaluation of the patient's history, physical, and imperical data, the patient meets criteria for observation/admission to the hospital.        Final diagnoses:   Hypoxia   Nondisplaced fracture of fifth metatarsal bone, unspecified foot, initial encounter for closed fracture   Right foot pain   Traumatic ecchymosis of right foot, initial encounter   Right middle lobe pulmonary nodule   Bronchiolitis        ED Disposition       ED Disposition   Decision to Admit    Condition   --    Comment   Level of Care: Remote Telemetry [26]   Diagnosis: Hypoxia [623272]   Admitting Physician: PAM ALONZO [[id number]]   Attending Physician: APM ALONZO [[id number]]   Certification: I Certify That Inpatient Hospital Services Are Medically Necessary For Greater Than 2 Midnights                 This medical record created using voice recognition software.             Margo Samson, APRN  11/29/23 8712

## 2023-11-28 NOTE — TELEPHONE ENCOUNTER
Caller: PATIENT    Relationship to patient: SELF    Best call back number: [phone]    Chief complaint: RIGHT FOOT PAIN & SWELLING    Type of visit: NEW PATIENT    Requested date: ASAP     If rescheduling, when is the original appointment:  1.08.24 AT 1:45 PM    Additional notes: PATIENT WAS CALLING TO SCHEDULE AN APPT WITH YOUR OFFICE FOR RIGHT FOOT PAIN & SWELLING. PATIENT HAS NOT BEEN SEEN FOR THIS DIAGNOSIS BUT FELL TWO DAYS AGO AND FEELS LIKE SHE MAY HAVE FRACTURED HER RIGHT FOOT. I WENT AHEAD AND SCHEDULED HER FOR THE FIRST AVAILABLE APPT ON 01.08.24 BUT SHE WOULD LIKE A SOONER APPT IF POSSIBLE. THANK YOU!

## 2023-11-29 LAB
ANION GAP SERPL CALCULATED.3IONS-SCNC: 6.3 MMOL/L (ref 5–15)
BASOPHILS # BLD AUTO: 0.01 10*3/MM3 (ref 0–0.2)
BASOPHILS NFR BLD AUTO: 0.2 % (ref 0–1.5)
BUN SERPL-MCNC: 16 MG/DL (ref 8–23)
BUN/CREAT SERPL: 25.4 (ref 7–25)
CALCIUM SPEC-SCNC: 8.3 MG/DL (ref 8.6–10.5)
CHLORIDE SERPL-SCNC: 100 MMOL/L (ref 98–107)
CO2 SERPL-SCNC: 31.7 MMOL/L (ref 22–29)
CREAT SERPL-MCNC: 0.63 MG/DL (ref 0.57–1)
DEPRECATED RDW RBC AUTO: 55.6 FL (ref 37–54)
EGFRCR SERPLBLD CKD-EPI 2021: 99.2 ML/MIN/1.73
EOSINOPHIL # BLD AUTO: 0 10*3/MM3 (ref 0–0.4)
EOSINOPHIL NFR BLD AUTO: 0 % (ref 0.3–6.2)
ERYTHROCYTE [DISTWIDTH] IN BLOOD BY AUTOMATED COUNT: 18.9 % (ref 12.3–15.4)
GLUCOSE SERPL-MCNC: 165 MG/DL (ref 65–99)
HCT VFR BLD AUTO: 37.2 % (ref 34–46.6)
HGB BLD-MCNC: 9.6 G/DL (ref 12–15.9)
IMM GRANULOCYTES # BLD AUTO: 0.06 10*3/MM3 (ref 0–0.05)
IMM GRANULOCYTES NFR BLD AUTO: 1.2 % (ref 0–0.5)
LYMPHOCYTES # BLD AUTO: 0.22 10*3/MM3 (ref 0.7–3.1)
LYMPHOCYTES NFR BLD AUTO: 4.5 % (ref 19.6–45.3)
MCH RBC QN AUTO: 20.9 PG (ref 26.6–33)
MCHC RBC AUTO-ENTMCNC: 25.8 G/DL (ref 31.5–35.7)
MCV RBC AUTO: 81 FL (ref 79–97)
MONOCYTES # BLD AUTO: 0.11 10*3/MM3 (ref 0.1–0.9)
MONOCYTES NFR BLD AUTO: 2.2 % (ref 5–12)
MRSA DNA SPEC QL NAA+PROBE: NORMAL
NEUTROPHILS NFR BLD AUTO: 4.53 10*3/MM3 (ref 1.7–7)
NEUTROPHILS NFR BLD AUTO: 91.9 % (ref 42.7–76)
NRBC BLD AUTO-RTO: 1.4 /100 WBC (ref 0–0.2)
PLATELET # BLD AUTO: 178 10*3/MM3 (ref 140–450)
PMV BLD AUTO: 10.5 FL (ref 6–12)
POTASSIUM SERPL-SCNC: 4.2 MMOL/L (ref 3.5–5.2)
PROCALCITONIN SERPL-MCNC: 0.1 NG/ML (ref 0–0.25)
RBC # BLD AUTO: 4.59 10*6/MM3 (ref 3.77–5.28)
SODIUM SERPL-SCNC: 138 MMOL/L (ref 136–145)
WBC NRBC COR # BLD AUTO: 4.93 10*3/MM3 (ref 3.4–10.8)

## 2023-11-29 PROCEDURE — 94799 UNLISTED PULMONARY SVC/PX: CPT

## 2023-11-29 PROCEDURE — 99233 SBSQ HOSP IP/OBS HIGH 50: CPT | Performed by: INTERNAL MEDICINE

## 2023-11-29 PROCEDURE — 25010000002 METHYLPREDNISOLONE PER 40 MG

## 2023-11-29 PROCEDURE — 84145 PROCALCITONIN (PCT): CPT | Performed by: INTERNAL MEDICINE

## 2023-11-29 PROCEDURE — 97165 OT EVAL LOW COMPLEX 30 MIN: CPT

## 2023-11-29 PROCEDURE — 25010000002 CEFTRIAXONE PER 250 MG

## 2023-11-29 PROCEDURE — 85025 COMPLETE CBC W/AUTO DIFF WBC: CPT | Performed by: STUDENT IN AN ORGANIZED HEALTH CARE EDUCATION/TRAINING PROGRAM

## 2023-11-29 PROCEDURE — 94664 DEMO&/EVAL PT USE INHALER: CPT

## 2023-11-29 PROCEDURE — 87641 MR-STAPH DNA AMP PROBE: CPT

## 2023-11-29 PROCEDURE — 25010000002 HEPARIN (PORCINE) PER 1000 UNITS: Performed by: STUDENT IN AN ORGANIZED HEALTH CARE EDUCATION/TRAINING PROGRAM

## 2023-11-29 PROCEDURE — 80048 BASIC METABOLIC PNL TOTAL CA: CPT | Performed by: STUDENT IN AN ORGANIZED HEALTH CARE EDUCATION/TRAINING PROGRAM

## 2023-11-29 RX ORDER — FAMOTIDINE 20 MG/1
20 TABLET, FILM COATED ORAL
Status: DISCONTINUED | OUTPATIENT
Start: 2023-11-29 | End: 2023-11-30 | Stop reason: HOSPADM

## 2023-11-29 RX ORDER — ROSUVASTATIN CALCIUM 5 MG/1
10 TABLET, COATED ORAL NIGHTLY
Status: DISCONTINUED | OUTPATIENT
Start: 2023-11-29 | End: 2023-11-30 | Stop reason: HOSPADM

## 2023-11-29 RX ORDER — BUDESONIDE 0.5 MG/2ML
0.5 INHALANT ORAL
Status: DISCONTINUED | OUTPATIENT
Start: 2023-11-29 | End: 2023-11-30

## 2023-11-29 RX ORDER — AZITHROMYCIN 250 MG/1
500 TABLET, FILM COATED ORAL DAILY
Status: COMPLETED | OUTPATIENT
Start: 2023-11-29 | End: 2023-11-29

## 2023-11-29 RX ORDER — CLONAZEPAM 0.5 MG/1
1 TABLET ORAL 2 TIMES DAILY PRN
Status: DISCONTINUED | OUTPATIENT
Start: 2023-11-29 | End: 2023-11-30 | Stop reason: HOSPADM

## 2023-11-29 RX ORDER — CLOPIDOGREL BISULFATE 75 MG/1
75 TABLET ORAL DAILY
Status: DISCONTINUED | OUTPATIENT
Start: 2023-11-29 | End: 2023-11-30 | Stop reason: HOSPADM

## 2023-11-29 RX ORDER — VENLAFAXINE HYDROCHLORIDE 150 MG/1
150 CAPSULE, EXTENDED RELEASE ORAL DAILY
Status: DISCONTINUED | OUTPATIENT
Start: 2023-11-29 | End: 2023-11-30 | Stop reason: HOSPADM

## 2023-11-29 RX ORDER — ARIPIPRAZOLE 15 MG/1
30 TABLET ORAL NIGHTLY
Status: DISCONTINUED | OUTPATIENT
Start: 2023-11-29 | End: 2023-11-30 | Stop reason: HOSPADM

## 2023-11-29 RX ORDER — ARFORMOTEROL TARTRATE 15 UG/2ML
15 SOLUTION RESPIRATORY (INHALATION)
Status: DISCONTINUED | OUTPATIENT
Start: 2023-11-29 | End: 2023-11-30

## 2023-11-29 RX ORDER — AZITHROMYCIN 250 MG/1
250 TABLET, FILM COATED ORAL DAILY
Status: DISCONTINUED | OUTPATIENT
Start: 2023-11-30 | End: 2023-11-30

## 2023-11-29 RX ORDER — NICOTINE 21 MG/24HR
1 PATCH, TRANSDERMAL 24 HOURS TRANSDERMAL
Status: DISCONTINUED | OUTPATIENT
Start: 2023-11-29 | End: 2023-11-30 | Stop reason: HOSPADM

## 2023-11-29 RX ORDER — GABAPENTIN 400 MG/1
400 CAPSULE ORAL 3 TIMES DAILY
Status: DISCONTINUED | OUTPATIENT
Start: 2023-11-29 | End: 2023-11-30 | Stop reason: HOSPADM

## 2023-11-29 RX ADMIN — DOXYCYCLINE 100 MG: 100 INJECTION, POWDER, LYOPHILIZED, FOR SOLUTION INTRAVENOUS at 08:54

## 2023-11-29 RX ADMIN — ARIPIPRAZOLE 30 MG: 15 TABLET ORAL at 21:13

## 2023-11-29 RX ADMIN — HEPARIN SODIUM 5000 UNITS: 5000 INJECTION INTRAVENOUS; SUBCUTANEOUS at 21:12

## 2023-11-29 RX ADMIN — DOCUSATE SODIUM 50MG AND SENNOSIDES 8.6MG 2 TABLET: 8.6; 5 TABLET, FILM COATED ORAL at 21:13

## 2023-11-29 RX ADMIN — HEPARIN SODIUM 5000 UNITS: 5000 INJECTION INTRAVENOUS; SUBCUTANEOUS at 00:59

## 2023-11-29 RX ADMIN — BUDESONIDE 0.5 MG: 0.5 SUSPENSION RESPIRATORY (INHALATION) at 18:14

## 2023-11-29 RX ADMIN — CEFTRIAXONE SODIUM 1000 MG: 1 INJECTION, SOLUTION INTRAVENOUS at 21:12

## 2023-11-29 RX ADMIN — GABAPENTIN 400 MG: 400 CAPSULE ORAL at 21:12

## 2023-11-29 RX ADMIN — IPRATROPIUM BROMIDE AND ALBUTEROL SULFATE 3 ML: .5; 3 SOLUTION RESPIRATORY (INHALATION) at 18:14

## 2023-11-29 RX ADMIN — ARFORMOTEROL TARTRATE 15 MCG: 15 SOLUTION RESPIRATORY (INHALATION) at 18:14

## 2023-11-29 RX ADMIN — BUDESONIDE 0.5 MG: 0.5 SUSPENSION RESPIRATORY (INHALATION) at 10:11

## 2023-11-29 RX ADMIN — IPRATROPIUM BROMIDE AND ALBUTEROL SULFATE 3 ML: .5; 3 SOLUTION RESPIRATORY (INHALATION) at 23:47

## 2023-11-29 RX ADMIN — HEPARIN SODIUM 5000 UNITS: 5000 INJECTION INTRAVENOUS; SUBCUTANEOUS at 08:53

## 2023-11-29 RX ADMIN — VENLAFAXINE HYDROCHLORIDE 150 MG: 150 CAPSULE, EXTENDED RELEASE ORAL at 10:41

## 2023-11-29 RX ADMIN — IPRATROPIUM BROMIDE AND ALBUTEROL SULFATE 3 ML: .5; 3 SOLUTION RESPIRATORY (INHALATION) at 06:27

## 2023-11-29 RX ADMIN — METHYLPREDNISOLONE SODIUM SUCCINATE 40 MG: 40 INJECTION INTRAMUSCULAR; INTRAVENOUS at 06:17

## 2023-11-29 RX ADMIN — METHYLPREDNISOLONE SODIUM SUCCINATE 40 MG: 40 INJECTION INTRAMUSCULAR; INTRAVENOUS at 21:11

## 2023-11-29 RX ADMIN — ROSUVASTATIN CALCIUM 10 MG: 5 TABLET, FILM COATED ORAL at 21:13

## 2023-11-29 RX ADMIN — GABAPENTIN 400 MG: 400 CAPSULE ORAL at 15:28

## 2023-11-29 RX ADMIN — FAMOTIDINE 20 MG: 20 TABLET ORAL at 16:47

## 2023-11-29 RX ADMIN — AZITHROMYCIN DIHYDRATE 500 MG: 250 TABLET ORAL at 10:41

## 2023-11-29 RX ADMIN — IPRATROPIUM BROMIDE AND ALBUTEROL SULFATE 3 ML: .5; 3 SOLUTION RESPIRATORY (INHALATION) at 12:08

## 2023-11-29 RX ADMIN — CLOPIDOGREL BISULFATE 75 MG: 75 TABLET ORAL at 15:28

## 2023-11-29 RX ADMIN — Medication 10 ML: at 01:00

## 2023-11-29 RX ADMIN — Medication 10 ML: at 08:53

## 2023-11-29 RX ADMIN — ARFORMOTEROL TARTRATE 15 MCG: 15 SOLUTION RESPIRATORY (INHALATION) at 10:11

## 2023-11-29 RX ADMIN — DOCUSATE SODIUM 50MG AND SENNOSIDES 8.6MG 2 TABLET: 8.6; 5 TABLET, FILM COATED ORAL at 08:53

## 2023-11-29 RX ADMIN — Medication 10 ML: at 21:12

## 2023-11-29 RX ADMIN — NICOTINE 1 PATCH: 21 PATCH, EXTENDED RELEASE TRANSDERMAL at 15:28

## 2023-11-29 RX ADMIN — METHYLPREDNISOLONE SODIUM SUCCINATE 40 MG: 40 INJECTION INTRAMUSCULAR; INTRAVENOUS at 12:46

## 2023-11-29 NOTE — PLAN OF CARE
Goal Outcome Evaluation:  Plan of Care Reviewed With: patient, daughter   Pt vss. Pt has no complaints of pain during this shift. Pt remains on 2L o2.

## 2023-11-29 NOTE — H&P
[facility]    Patient Identification:  Name:  Gabriella Jha  Age:  63 y.o.  Sex:  female  :  1960  MRN:  [id number]   Visit Number:  [id number]  Admit Date: 2023   Room number:    Primary Care Physician:  Carolina Parra APRN    Date of Admission: 2023     Subjective     Chief complaint:    Chief Complaint   Patient presents with    Foot Pain    Shortness of Breath       History of presenting illness:    This is a 63-year-old female with a past medical history of smoker, COPD, hypertension, anxiety, depression, history of CVA and lung cancer presenting with hypoxia. Pt states that 3 days ago, she tripped while walking and fell to the floor. She presented to an urgent care today for right toe pain. While at the urgent care, her oxygen saturation was ranging from 50 to 70% on room air.  She was hooked up to 2 L of oxygen via nasal cannula in exam room with oxygen saturations ranging from 85 to 90% on the 2 L.  Upon O2 walk test on room air, her oxygen saturation was dipping to the upper 70s. She was transferred to our hospital for further management.  At this time, she denies any chest pain, SOB, N/V, abdominal pain or headaches    In ED, blood pressure 107/62, heart rate 93, respiratory rate of 28, temperature 96.8 and O2 saturation 70% on room air.  Labs on arrival showed a sodium 138, thousand 3.9, BUN 19, creatinine 0.67, WBC 6.2, hemoglobin 9.8 and platelet count 193.  Chest x-ray showed no active pulmonary process.  Right foot x-ray showed nondisplaced fractures of the base of the right fifth metatarsal.  CT chest showed no evidence of pulmonary embolism.  Findings of bronchitis and bilateral hilar lymph node enlargements.    Assessment:  Bronchitis  Hypoxia  COPD  Right fifth metatarsal fracture  Anemia  Anxiety  Depression  History of CVA    Plan:  -Admit to inpatient  -DuoNebs every 4 hours  -Titrate oxygen to SaO2 of 88% 92%  -Follow-up  sputum and blood cultures  -r/o MRSA, Legionella, Strep pneumo, Mycoplasma  -Solu-Medrol 40 q8h  -Ceftriaxone plus Doxy  -Monitor SaO2        ---------------------------------------------------------------------------------------------------------------------   Review of Systems   Constitutional:  Negative for chills and fatigue.   HENT:  Negative for drooling, nosebleeds and sore throat.    Eyes:  Negative for redness.   Respiratory:  Positive for cough, shortness of breath and wheezing.    Cardiovascular:  Negative for leg swelling.   Gastrointestinal:  Negative for blood in stool and rectal pain.   Endocrine: Negative for polydipsia.   Genitourinary:  Negative for dyspareunia and menstrual problem.   Musculoskeletal:  Negative for gait problem.   Neurological:  Negative for facial asymmetry and speech difficulty.   Psychiatric/Behavioral:  Negative for confusion.      ---------------------------------------------------------------------------------------------------------------------   Past Medical History:   Diagnosis Date    Anxiety     CHF (congestive heart failure)     COPD (chronic obstructive pulmonary disease)     GERD (gastroesophageal reflux disease)     Hyperlipemia     Hypertension     Lung cancer     Migraines      Past Surgical History:   Procedure Laterality Date     SECTION      HYSTERECTOMY       History reviewed. No pertinent family history.  Social History     Socioeconomic History    Marital status:    Tobacco Use    Smoking status: Every Day     Packs/day: .25     Types: Cigarettes     Start date: 1974    Smokeless tobacco: Never   Vaping Use    Vaping Use: Never used   Substance and Sexual Activity    Alcohol use: Not Currently    Drug use: Never    Sexual activity: Defer     ---------------------------------------------------------------------------------------------------------------------   Allergies:  Clindamycin  hcl  ---------------------------------------------------------------------------------------------------------------------   Medications below are reported home medications pulling from within the system; at this time, these medications have not been reconciled unless otherwise specified and are in the verification process for further verifcation as current home medications.      Prior to Admission Medications       Prescriptions Last Dose Informant Patient Reported? Taking?    albuterol sulfate  (90 Base) MCG/ACT inhaler   No No    Inhale 2 puffs Every 4 (Four) Hours As Needed for Wheezing or Shortness of Air.    ARIPiprazole (ABILIFY) 30 MG tablet   Yes No    Take 1 tablet by mouth Every Night.    atorvastatin (LIPITOR) 20 MG tablet   Yes No    atorvastatin 20 mg tablet   Take 1 tablet every day by oral route.    Cholecalciferol (Vitamin D3) 1.25 MG (52897 UT) capsule   Yes No    Take 1 capsule by mouth Every 7 (Seven) Days.      clonazePAM (KlonoPIN) 1 MG tablet   Yes No    Take 1 tablet by mouth 2 (Two) Times a Day As Needed.    clopidogrel (PLAVIX) 75 MG tablet   Yes No    Take 1 tablet by mouth Daily.    gabapentin (NEURONTIN) 400 MG capsule   Yes No    Take 1 capsule by mouth 3 (Three) Times a Day.    losartan-hydrochlorothiazide (HYZAAR) 100-25 MG per tablet   Yes No    rosuvastatin (CRESTOR) 10 MG tablet   Yes No    Take 1 tablet by mouth every night at bedtime.    venlafaxine XR (EFFEXOR-XR) 150 MG 24 hr capsule   Yes No    Take 1 capsule by mouth Daily.    losartan (COZAAR) 50 MG tablet   Yes No    Take 1 tablet by mouth Daily.          Objective     Vital Signs:  Temp:  [96.8 °F (36 °C)-98.9 °F (37.2 °C)] 98.9 °F (37.2 °C)  Heart Rate:  [90-93] 93  Resp:  [18-28] 18  BP: ()/(58-62) 98/58    No data found.  SpO2:  [70 %-96 %] 93 %  on  Flow (L/min):  [3] 3;   Device (Oxygen Therapy): nasal cannula  Body mass index is 23.41 kg/m².    Wt Readings from Last 3 Encounters:   11/28/23 56.2 kg (123  lb 14.4 oz)   11/28/23 54.4 kg (120 lb)   11/13/23 53.5 kg (118 lb)      ----------------------------------------------------------------------------------------------------------------------  PHYSICAL EXAMINATION:  GENERAL: The patient is well developed and nontoxic.  HEENT: Nonicteric sclerae, PERRLA, EOMI. Oropharynx clear. Moist mucous membranes. Conjunctivae appear well perfused.  CHEST: Chest wall is nontender.  HEART: Regular rate and rhythm without murmurs.  LUNGS: Expiratory wheezing  ABDOMEN: Soft, positive bowel sounds, nontender, no organomegaly.  RECTAL: Deferred.  SKIN: No rash, no excessive bruising, petechiae, or purpura.  NEUROLOGIC: Cranial nerves II-XII intact without motor/sensory deficit.    ---------------------------------------------------------------------------------------------------------------------  --------------------------------------------------------------------------------------------------------------------  LABS:    CBC and coagulation:  Results from last 7 days   Lab Units 11/28/23  1645 11/28/23  1552   LACTATE, ARTERIAL mmol/L  --  1.20   WBC 10*3/mm3 6.27  --    HEMOGLOBIN g/dL 9.8*  --    HEMATOCRIT % 36.7  --    MCV fL 80.1  --    MCHC g/dL 26.7*  --    PLATELETS 10*3/mm3 193  --      Acid/base balance:      Renal and electrolytes:  Results from last 7 days   Lab Units 11/28/23  1719 11/28/23  1552   SODIUM mmol/L 138  --    SODIUM, VENOUS mmol/L  --  137.4   POTASSIUM mmol/L 3.9  --    POTASSIUM, VENOUS mmol/L  --  4.6   CHLORIDE mmol/L 100  --    CHLORIDE, VENOUS mmol/L  --  101   CO2 mmol/L 30.6*  --    BUN mg/dL 19  --    CREATININE mg/dL 0.67  --    CALCIUM mg/dL 8.5*  --    IONIZED CALCIUM mmol/L  --  1.06*   GLUCOSE mg/dL 80  --    GLUCOSE, ARTERIAL mg/dL  --  76     Estimated Creatinine Clearance: 75.3 mL/min (by C-G formula based on SCr of 0.67 mg/dL).    Liver and pancreatic function:  Results from last 7 days   Lab Units 11/28/23  1719   ALBUMIN g/dL 3.3*  "  BILIRUBIN mg/dL 0.2   ALK PHOS U/L 80   AST (SGOT) U/L 14   ALT (SGPT) U/L 6     Endocrine function:  No results found for: \"HGBA1C\"  Point of care bedside glucose levels:      Lab Results   Component Value Date    TSH 2.690 02/17/2020    FREET4 0.9 02/17/2020     Cardiac:        Cultures:  Lab Results   Component Value Date    COLORU Yellow 07/03/2023    CLARITYU Clear 07/03/2023    PHUR 5.5 07/03/2023    GLUCOSEU Negative 07/03/2023    KETONESU Negative 07/03/2023    BLOODU Trace (A) 07/03/2023    NITRITEU Negative 07/03/2023    LEUKOCYTESUR Moderate (2+) (A) 07/03/2023    BILIRUBINUR Negative 07/03/2023    UROBILINOGEN 0.2 E.U./dL 07/03/2023    RBCUA 0-2 (A) 07/03/2023    WBCUA 6-12 (A) 07/03/2023    BACTERIA None Seen 07/03/2023     Microbiology Results (last 10 days)       Procedure Component Value - Date/Time    COVID PRE-OP / PRE-PROCEDURE SCREENING ORDER (NO ISOLATION) - Swab, Nasopharynx [[id number]]  (Normal) Collected: 11/28/23 1559    Lab Status: Final result Specimen: Swab from Nasopharynx Updated: 11/28/23 1643    Narrative:      The following orders were created for panel order COVID PRE-OP / PRE-PROCEDURE SCREENING ORDER (NO ISOLATION) - Swab, Nasopharynx.  Procedure                               Abnormality         Status                     ---------                               -----------         ------                     COVID-19, FLU A/B, RSV P...[[id number]]  Normal              Final result                 Please view results for these tests on the individual orders.    COVID-19, FLU A/B, RSV PCR 1 HR TAT - Swab, Nasopharynx [[id number]]  (Normal) Collected: 11/28/23 1559    Lab Status: Final result Specimen: Swab from Nasopharynx Updated: 11/28/23 1643     COVID19 Not Detected     Influenza A PCR Not Detected     Influenza B PCR Not Detected     RSV, PCR Not Detected    Narrative:      Fact sheet for providers: https://www.fda.gov/media/426356/download    Fact sheet for patients: " "https://www.fda.gov/media/643123/download    Test performed by PCR.            No results found for: \"PREGTESTUR\", \"PREGSERUM\", \"HCG\", \"HCGQUANT\"  Pain Management Panel           No data to display                I have personally looked at the labs and they are summarized above.  ----------------------------------------------------------------------------------------------------------------------  Detailed radiology reports for the last 24 hours:    Imaging Results (Last 24 Hours)       Procedure Component Value Units Date/Time    CT Chest With Contrast Diagnostic [[id number]] Collected: 11/28/23 1948     Updated: 11/28/23 1951    Narrative:      PROCEDURE: CT CHEST W CONTRAST DIAGNOSTIC     COMPARISON:  None  INDICATIONS: LOW OXYGEN, PT DENIES SHORTNESS OF BREATH. r/o PE     TECHNIQUE: After obtaining the patient's consent, CT images were obtained with non-ionic   intravenous contrast material.       PROTOCOL:   Pulmonary embolism imaging protocol performed      RADIATION:   DLP: 246.5 mGy*cm    Automated exposure control was utilized to minimize radiation dose.   CONTRAST: 59 cc Isovue 370 I.V.  LABS:   eGFR: 97.7 ml/min/1.73m2     FINDINGS:   Pulmonary arteries:  Adequately opacified.  No emboli demonstrated     Sonal/mediastinum:  Interval pre-vascular lymph node enlargement, now 1.1 cm, was 8 mm.  Interval   bilateral hilar lymph node enlargement.  No pericardial effusion.  Coronary artery calcification     Lungs/pleura:  Stable left perihilar fibrosis with scarring radiating into the left upper lobe.    Bilateral bronchial wall thickening.  Prominent tree-in-bud opacities in the right lower lobe, with   scattered tree-in-bud opacities elsewhere in the lungs.  Strandy airspace disease in the lateral   right lung base that likely represents atelectasis.  5 mm nodule in the medial right middle lobe.    No pleural effusion     Upper abdomen:  No acute abnormality.  Cysts in the liver     Bones/soft tissues:  No " acute bony abnormality.       Impression:            1. No evidence of pulmonary embolism     2. Findings of bronchitis, with bronchiolitis changes that are most pronounced in the right lower   lobe     3. Pre-vascular and bilateral hilar lymph node enlargement which may be reactive or metastatic,   recommend attention on follow-up     4. 5 mm nodule in the medial right middle lobe that is probably stable             EDY WOODY MD         Electronically Signed and Approved By: EDY WOODY MD on 11/28/2023 at 19:48                     XR Chest 1 View [[id number]] Collected: 11/28/23 1610     Updated: 11/28/23 1613    Narrative:      PROCEDURE: XR CHEST 1 VW     COMPARISON: [facility], CT, CT CHEST W CONTRAST DIAGNOSTIC, 10/31/2023, 16:09.     INDICATIONS: SHORTNESS OF BREATH TODAY     FINDINGS:   Densely calcified right lower lobe granuloma.  Linear scarring/atelectasis in the left upper lobe   likely related to post treatment related changes.  There is underlying emphysema.  Negative for   lobar infiltrate, large effusion, edema or pneumothorax.  Heart size within normal limits.    Degenerative changes within thoracic spine with mild dextrocurvature.  Aortic atherosclerotic   disease.       Impression:         No active pulmonary process.  Chronic findings as above.                  KIZZY TRUJILLO MD         Electronically Signed and Approved By: IKZZY TRUJILLO MD on 11/28/2023 at 16:10                     XR Foot 3+ View Right [[id number]] Collected: 11/28/23 1609     Updated: 11/28/23 1612    Narrative:      PROCEDURE: XR FOOT 3+ VW RIGHT     COMPARISON: [facility], CR, FOOT >OR= 3V RT, 6/17/2019, 18:14.     INDICATIONS: GENERALIZED RIGHT FOOT PAIN AFTER FALL TODAY     FINDINGS:   BONES: There is a nondisplaced fracture at the base of the right 5th metatarsal.  There is a   plantar calcaneal enthesophyte.  SOFT TISSUES: Mild soft tissue swelling is present.  EFFUSION: None  visible.    OTHER: Negative.         Impression:       Nondisplaced fractures of the base of the right 5th metatarsal.               JUAN CABRERA MD         Electronically Signed and Approved By: JUAN CABRERA MD on 11/28/2023 at 16:09                           Final impressions for the last 30 days of radiology reports:    CT Chest With Contrast Diagnostic    Result Date: 11/28/2023     1. No evidence of pulmonary embolism  2. Findings of bronchitis, with bronchiolitis changes that are most pronounced in the right lower lobe  3. Pre-vascular and bilateral hilar lymph node enlargement which may be reactive or metastatic, recommend attention on follow-up  4. 5 mm nodule in the medial right middle lobe that is probably stable     EDY WOODY MD       Electronically Signed and Approved By: EDY WOODY MD on 11/28/2023 at 19:48             XR Chest 1 View    Result Date: 11/28/2023    No active pulmonary process.  Chronic findings as above.       KIZZY TRUJILLO MD       Electronically Signed and Approved By: KIZZY TRUJILLO MD on 11/28/2023 at 16:10             XR Foot 3+ View Right    Result Date: 11/28/2023   Nondisplaced fractures of the base of the right 5th metatarsal.      JUAN CABRERA MD       Electronically Signed and Approved By: JUAN CABRERA MD on 11/28/2023 at 16:09             CT Chest With Contrast Diagnostic    Result Date: 11/1/2023    1. Stable 8 mm prevascular lymph node.  No new or enlarging mediastinal or hilar nodes. 2. Stable treatment related change in the left perihilar region.  3. No evidence of metastatic disease in chest.     TERE CISNEROS MD       Electronically Signed and Approved By: TERE CISNEROS MD on 11/01/2023 at 0:33                Assessment & Plan       This is a 63-year-old female with a past medical history of COPD, hypertension, anxiety, depression, history of CVA and lung cancer presenting with shortness of breath.     Assessment:  COPD  exacerbation  Bronchitis  Hypoxia  Right fifth metatarsal fracture  Anemia  Anxiety  Depression  History of CVA    Plan:  -Admit to inpatient  -DuoNebs every 4 hours  -Titrate oxygen to SaO2 of 88% 92%  -Follow-up sputum and blood cultures  -Solu-Medrol 40 q8h  -Ceftriaxone plus Doxy  -Monitor SaO2  -Pain management   -Monitor CBC and BMP    Marc Evans MD  [facility]  11/28/23  20:22 EST

## 2023-11-29 NOTE — CASE MANAGEMENT/SOCIAL WORK
Discharge Planning Assessment  [facility]     Patient Name: Gabriella Jha  MRN: [id number]  Today's Date: 11/29/2023    Admit Date: 11/28/2023    Plan: Assessment completed with patient daughter who is at bedside. CM role explained and discharge planning discussed. Information on face sheet verified. PCP is Carolina Parra. Patient is enrolled in [facility]. Patient lives alone, daughter states patient has strong family and social support. Daughter notes that patient has been having frequent falls at home, PT/OT has been consulted.  Patient does not want to go to inpatient rehab and plans to return home on discharge. Patient will likely need walking oximetry, agreeable to use [employer] for DME needs. Of note patient daughter is an RN here at [facility]. Plan at this time is to return home and will have transportation. CM will continue to follow and assist with discharge planning needs.   Discharge Needs Assessment       Row Name 11/29/23 1149       Living Environment    People in Home alone    Current Living Arrangements apartment    Potentially Unsafe Housing Conditions none    Primary Care Provided by self    Provides Primary Care For no one    Family Caregiver if Needed child(hazel), adult;sibling(s);other relative(s)    Quality of Family Relationships helpful;involved;supportive    Able to Return to Prior Arrangements yes       Resource/Environmental Concerns    Resource/Environmental Concerns none       Transition Planning    Patient/Family Anticipates Transition to home    Patient/Family Anticipated Services at Transition durable medical equipment    Transportation Anticipated family or friend will provide       Discharge Needs Assessment    Readmission Within the Last 30 Days no previous admission in last 30 days    Equipment Currently Used at Home nebulizer    Concerns to be Addressed discharge planning    Equipment Needed After Discharge pulse ox;oxygen    Provided Post Acute Provider List? N/A    Provided Post Acute  Provider Quality & Resource List? N/A                   Discharge Plan       Row Name 11/29/23 0889       Plan    Plan Assessment completed with patient daughter who is at bedside. CM role explained and discharge planning discussed. Information on face sheet verified. PCP is Carolina Parra. Patient is enrolled in [facility]. Patient lives alone, daughter states patient has strong family and social support. Daughter notes that patient has been having frequent falls at home, PT/OT has been consulted.  Patient does not want to go to inpatient rehab and plans to return home on discharge. Patient will likely need walking oximetry, agreeable to use [employer] for DME needs. Of note patient daughter is an RN here at [facility]. Plan at this time is to return home and will have transportation. CM will continue to follow and assist with discharge planning needs.    Patient/Family in Agreement with Plan yes                  Continued Care and Services - Admitted Since 11/28/2023    Coordination has not been started for this encounter.          Demographic Summary       Row Name 11/29/23 1143       General Information    Admission Type inpatient    Arrived From urgent care    Referral Source admission list    Reason for Consult discharge planning    Preferred Language English       Contact Information    Permission Granted to Share Info With pharmacist/pharmacy;family/designee;                   Functional Status       Row Name 11/29/23 1148       Functional Status    Usual Activity Tolerance good    Current Activity Tolerance moderate       Functional Status, IADL    Medications independent    Meal Preparation independent    Housekeeping independent    Laundry independent    Shopping independent    IADL Comments Patient has been providing her own transportation up until about 2 weeks ago. Family is assiting with transportation needs at this time.                   Psychosocial    No documentation.                  Abuse/Neglect     No documentation.                  Legal    No documentation.                  Substance Abuse    No documentation.                  Patient Forms    No documentation.                     Claudia Parkinson RN

## 2023-11-29 NOTE — CONSULTS
COPD Education    Referring Provider: NINA  Reason for Consultation:  NEW HYPOXIA  Pulmonologist:  CONG    Age: 63 y.o.  Sex: female  BMI:Body mass index is 22.54 kg/m².     Past Medical Hx: COPD, hypertension, anxiety, depression, history of CVA and lung cancer     Smoking Hx: Social History    Tobacco Use      Smoking status: Every Day        Packs/day: .25        Types: Cigarettes        Start date: 11/18/1974      Smokeless tobacco: Never    Social History     Substance and Sexual Activity   Drug Use Never      Home Equipment Used:  none; previously on nocturnal O2, but returned     ADL's: independent Lives: @ HOME (apartment)    Daily symptoms: SOA at w/exertion, nonproductive cough, wheeze    Airway Clearance methods utilized: none previously, OPEP/Aerobika at bedside    Have you ever attended Pulmonary Rehab?  no    Last PFT: 5/13/20  PARAMETER BEST % PREDICTED   FVC L 1.53 32   FEV1 L 0.69 30 (17% CHANGE)   FEV1/FVC % 45    RV 1.86 323   DLCO 20.78 33     Classification of Airflow Limitation Severity in COPD (Based on Post-Bronchodilator FEV1)  Gold 1: Mild FEV1 ? 80% predicted   Gold 2:  Moderate 50% ? FEV1 < 80% predicted   Gold 3: Severe 30% ? FEV1 < 50% predicted   Gold 4: Very Severe FEV1 < 30% predicted     Have you ever had a sleep study/PSG? no    Last Arterial Blood Gas:   Lab Results   Component Value Date    VDW4VMY 36.1 (H) 04/28/2020     Chest CT findings:   2. Findings of bronchitis, with bronchiolitis changes that are most pronounced in the right lower lobe     3. Pre-vascular and bilateral hilar lymph node enlargement which may be reactive or metastatic, recommend attention on follow-up     4. 5 mm nodule in the medial right middle lobe that is probably stable        Home Medications:  Medications Prior to Admission   Medication Sig Dispense Refill Last Dose    albuterol sulfate  (90 Base) MCG/ACT inhaler Inhale 2 puffs Every 4 (Four) Hours As Needed for Wheezing or Shortness  of Air. 18 g 4     ARIPiprazole (ABILIFY) 30 MG tablet Take 1 tablet by mouth Every Night.       Cholecalciferol (Vitamin D3) 1.25 MG (20346 UT) capsule Take 1 capsule by mouth Every 7 (Seven) Days.         clonazePAM (KlonoPIN) 1 MG tablet Take 1 tablet by mouth 2 (Two) Times a Day As Needed.       clopidogrel (PLAVIX) 75 MG tablet Take 1 tablet by mouth Daily.       gabapentin (NEURONTIN) 400 MG capsule Take 1 capsule by mouth 3 (Three) Times a Day.       losartan (COZAAR) 50 MG tablet Take 1 tablet by mouth Daily.       rosuvastatin (CRESTOR) 10 MG tablet Take 1 tablet by mouth every night at bedtime.       venlafaxine XR (EFFEXOR-XR) 150 MG 24 hr capsule Take 1 capsule by mouth Daily.        Do you use a Spacer/Holding Chamber with your MDI?: no    COPD disease process and symptom management discussed with patient and her daughter. Ms Jha states she has non productive cough, wheezing and shortness of breath and wheezing with exertion. She states she not very active due to SOA and fear of falling. She does not use cane or walker for ambulation. Ms. Jha is still smoking and states she enjoys it too much to quit at this time. Patient states she is not on home oxygen and uses her rescue inhaler only about twice a week. PFT from 2020 reveals severe COPD (Fev1 30%) with airtrapping. Per CMS guidelines, patient qualified for NIV then. Given continued smoking status, patient's lung function has likely worsened to the the very severe stage. Daughter states she has concerns about TWIN stating Ms Jha often awakens extremely SOA and she has witnessed gasping noises/apneas while patient was sleeping. Patient's daughter also states sometimes patient awakens confused and groggy, especially after taking Klonopin; no recent ABG's on file. RT CM discussed use of oxygen and maintenance medication with patient.  Instruction on rescue and maintenance medications, the function of each and the importance of using them as  prescribed was discussed and patient is agreeable to trying Stiolto at home and oxygen if POC is prescribed.  Patient was instructed NOT to smoke while wearing home oxygen and to try to wear oxygen as much as possible.    Ms Jha will need walking oximetry within 48 hours of discharge to qualify for home oxygen.      COPD clinic follow up referral and in pulmonary rehab orders placed.    Recommend discharge with Stiolto respimat on discharge for COPD maintenance per GOLD guidelines; I will provide patient with voucher.     Ms. Jha would benefit from NIV with Astral but patient may have issues with adherence.  Patient would need updated spirometry to qualify for NIV and outpatient pulmonary rehab.      I will continue to follow and provide education and encouragement regarding COPD self management.       Josette Hanna, RRT   RT   11/29/23  10:39 EST

## 2023-11-29 NOTE — PROGRESS NOTES
Respiratory Therapist Broncho-Pulmonary Hygiene Progress Note      Patient Name:  Gabriella Jha  YOB: 1960    Gabriella Jha meets the qualification for Level 2 of the Bronco-Pulmonary Hygiene Protocol. This was based on my daily patient assessment and includes review of chest x-ray results, cough ability and quality, oxygenation, secretions or risk for secretion development and patient mobility.     Broncho-Pulmonary Hygiene Assessment:    Level of Movement: Actively changing positions without assistance  Alert/ oriented/ cooperative    Breath Sounds: Clear to slightly diminished    Cough: Strong, effective    Chest X-Ray: Presence of atelectasis and/or consolidation    Sputum Productions: None or small amount of thin or watery secretions with effective cough    History and Physical: New onset of bronchitis or existing chronic pulmonary conditions.  **(not in an exacerbation)    SpO2 to Oxygen Need: greater than 92% on room air or  less than 3L nasal canula    Current SpO2 is: 94% on 2L    Based on this information, I have completed the following interventions: Aerobika with bronchodialtor medication or TID      Electronically signed by Shana Mcguire RRT, 11/29/23, 10:12 AM EST.

## 2023-11-29 NOTE — PROGRESS NOTES
[facility]   Hospitalist Progress Note  Date: 2023  Patient Name: Gabriella Jha  : 1960  MRN: [id number]  Date of admission: 2023      Subjective   Subjective     Chief Complaint: Shortness of air.  Right foot pain after fall    Summary:   This is a 63-year-old female with a past medical history of smoker, COPD, hypertension, anxiety, depression, history of CVA and lung cancer presenting with hypoxia. Pt states that 3 days ago, she tripped while walking and fell to the floor. She presented to an urgent care today for right toe pain. While at the urgent care, her oxygen saturation was ranging from 50 to 70% on room air.  She was hooked up to 2 L of oxygen via nasal cannula in exam room with oxygen saturations ranging from 85 to 90% on the 2 L.  Upon O2 walk test on room air, her oxygen saturation was dipping to the upper 70s. She was transferred to our hospital for further management.  At this time, she denies any chest pain, SOB, N/V, abdominal pain or headaches     In ED, blood pressure 107/62, heart rate 93, respiratory rate of 28, temperature 96.8 and O2 saturation 70% on room air.  Labs on arrival showed a sodium 138, thousand 3.9, BUN 19, creatinine 0.67, WBC 6.2, hemoglobin 9.8 and platelet count 193.  Chest x-ray showed no active pulmonary process.  Right foot x-ray showed nondisplaced fractures of the base of the right fifth metatarsal.  CT chest showed no evidence of pulmonary embolism.  Findings of bronchitis and bilateral hilar lymph node enlargements.  Interval Followup:   90% sats on 2 L.  No home oxygen use.  Patient was prescribed home oxygen long time ago but never used it.  Continues to have wheezing and cough.  Not able to bring up mucus.  No chest pain.  Right foot pain improving.  No more lethargic or unsteady.  Wondering when she will be going home.    Review of Systems   All systems were reviewed and negative except for: Summary and interval follow-up    Objective    Objective     Vitals:   Temp:  [98.2 °F (36.8 °C)-98.9 °F (37.2 °C)] 98.4 °F (36.9 °C)  Heart Rate:  [89-98] 98  Resp:  [18] 18  BP: (120-148)/(66-75) 132/74  Flow (L/min):  [2-4] 2  Physical Exam    Constitutional: Awake, alert, no acute distress   Eyes: Pupils equal, sclerae anicteric, no conjunctival injection   HENT: NCAT, mucous membranes moist.  Edentulous   Neck: Supple, no thyromegaly, no lymphadenopathy, trachea midline   Respiratory: Wheezing  bilaterally, nonlabored respirations    Cardiovascular: RRR, no murmurs, rubs, or gallops, palpable pedal pulses bilaterally   Gastrointestinal: Positive bowel sounds, soft, nontender, nondistended   Musculoskeletal: Swollen, bruised and tender right lateral proximal foot area at fifth metatarsal.  No bilateral ankle edema, no clubbing or cyanosis to extremities   Psychiatric: Appropriate affect, cooperative   Neurologic: Oriented x 3, strength symmetric in all extremities, Cranial Nerves grossly intact to confrontation, speech clear   Skin: No rashes     Result Review    Result Review:  I have personally reviewed the results for the past 24 hours and agree with these findings:  [x]  Laboratory  [x]  Microbiology  [x]  Radiology  []  EKG/Telemetry   []  Cardiology/Vascular   []  Pathology  [x]  Old records  [x]  Other: Medications    Assessment & Plan   Assessment / Plan     Assessment:    Hypoxia.  No recent home oxygen use  Acute COPD exacerbation  Right fifth metatarsal fracture  Fall at home causing above  Anemia  Anxiety  Depression  History of CVA  History of lung cancer.  Bilateral hilar lymphadenopathy.  5 mm right lung nodule appears to be stable.  Tobacco abuse disorder.       Plan:  -Pulmicort and brovana neb  -DuoNebs every 4 hours  Bronchodilator and bronchopulmonary hygiene protocol  -Titrate oxygen to SaO2 of 88% 92%  -Follow-up sputum and blood cultures  -r/o MRSA, Legionella, Strep pneumo, Mycoplasma.  All negative  -Solu-Medrol 40  q8h  -Ceftriaxone plus Zithromax.  DC doxycycline  -Resumed home medication including Effexor, Crestor, Plavix, Neurontin, Klonopin and Abilify.  -Check iron profile and Hemoccult stool.  Pepcid.  Continue short leg walking boot for right foot fracture.  Minimize weightbearing.  Nicotine patch.  DC telemetry.  RT case management consulted for COPD clinic set up.  ABG noted.  Smoking cessation discussed with patient    Discussed plan withRN and .  Discharge home in next couple of days.  Will need walking oximetry prior to discharge for home oxygen set up    DVT prophylaxis:  Medical DVT prophylaxis orders are present.    CODE STATUS:   Level Of Support Discussed With: Patient  Code Status (Patient has no pulse and is not breathing): CPR (Attempt to Resuscitate)  Medical Interventions (Patient has pulse or is breathing): Full Support      Part of this note may be an electronic transcription/translation of spoken language to printed text using the Dragon Dictation System.     Electronically signed by Brandon Fenton MD, 11/29/23, 3:26 PM EST.

## 2023-11-29 NOTE — PLAN OF CARE
Goal Outcome Evaluation:  Plan of Care Reviewed With: patient        Progress: no change  Outcome Evaluation: Patient arrived from ED. HR 's. Patient is very unsteady on feet with poor balance. Patient appears lethargic, reports falling at home, doesnt know how or why she fell, but remembers that she did not lose consciousness.

## 2023-11-29 NOTE — THERAPY EVALUATION
Patient Name: Gabriella Jha  : 1960    MRN: [id number]                              Today's Date: 2023       Admit Date: 2023    Visit Dx:     ICD-10-CM ICD-9-CM   1. Hypoxia  R09.02 799.02   2. Nondisplaced fracture of fifth metatarsal bone, unspecified foot, initial encounter for closed fracture  S92.356A 825.25   3. Right foot pain  M79.671 729.5   4. Traumatic ecchymosis of right foot, initial encounter  S90.31XA 924.20   5. Right middle lobe pulmonary nodule  R91.1 793.11   6. Bronchiolitis  J21.9 466.19   7. Chronic obstructive pulmonary disease, unspecified COPD type  J44.9 496   8. Impaired mobility and ADLs  Z74.09 V49.89    Z78.9      Patient Active Problem List   Diagnosis    Abdominal hernia    Anxiety disorder    Cerebral infarction    Chronic obstructive pulmonary disease    Chronic tension-type headache, intractable    Depressive disorder    Diastolic congestive heart failure    Diverticulitis    Gastroesophageal reflux disease    Headache    Hyperlipidemia    Hypertension    Indeterminate colitis    Major depressive disorder with single episode    Methicillin susceptible Staphylococcus aureus infection    Other hereditary and idiopathic neuropathies    Peripheral neuropathic pain    Postmenopausal state    Requests hormone replacement therapy    Special screening for malignant neoplasms, colon    Lung cancer    Malignant neoplasm of upper lobe, right bronchus or lung    Encounter for screening colonoscopy    Hypoxia     Past Medical History:   Diagnosis Date    Anxiety     CHF (congestive heart failure)     COPD (chronic obstructive pulmonary disease)     GERD (gastroesophageal reflux disease)     Hyperlipemia     Hypertension     Lung cancer     Migraines      Past Surgical History:   Procedure Laterality Date     SECTION      HYSTERECTOMY        General Information       Row Name 23 1455          OT Time and Intention    Document Type evaluation  -AC     Mode of  Treatment individual therapy;occupational therapy  -       Row Name 11/29/23 1458          General Information    Patient Profile Reviewed yes  -     Prior Level of Function --  patient reports (I) with adls and functional mobility. She states she drives and was not on any supplementary O2  -     Existing Precautions/Restrictions fall;oxygen therapy device and L/min  -     Barriers to Rehab none identified  -       Row Name 11/29/23 1455          Occupational Profile    Reason for Services/Referral (Occupational Profile) Pt. is a 63year old female admitted for the above diagnosis. Pt. referred to OT services to assess independence with ADLs and adl transfers/fx'l mobility. No previous OT services for current condition.  -       Row Name 11/29/23 1455          Living Environment    People in Home alone  -       Row Name 11/29/23 1455          Home Main Entrance    Number of Stairs, Main Entrance none  -       Row Name 11/29/23 1455          Cognition    Orientation Status (Cognition) oriented x 3  -       Row Name 11/29/23 1455          Safety Issues, Functional Mobility    Impairments Affecting Function (Mobility) balance;endurance/activity tolerance;shortness of breath  -               User Key  (r) = Recorded By, (t) = Taken By, (c) = Cosigned By      Initials Name Provider Type     Mallika Alonzo, OT Occupational Therapist                     Mobility/ADL's       Row Name 11/29/23 1454          Bed Mobility    Bed Mobility bed mobility (all) activities  -     All Activities, [place] (Bed Mobility) contact guard;1 person assist  -     Assistive Device (Bed Mobility) head of bed elevated  -       Row Name 11/29/23 1457          Transfers    Transfers sit-stand transfer  -       Row Name 11/29/23 1457          Sit-Stand Transfer    Sit-Stand [place] (Transfers) contact guard;1 person assist  -       Row Name 11/29/23 1457          Functional Mobility    Functional Mobility-  Comment patient was CGA with hand held assist for approx 6 steps at EOB with cues for safety.  -       Row Name 11/29/23 1457          Activities of Daily Living    BADL Assessment/Intervention --  patient is setup for upper body bathing/dressing, setup for grooming, setup for self-feeding, min A for lower body bathing/dressing, CGA for toileting.  -               User Key  (r) = Recorded By, (t) = Taken By, (c) = Cosigned By      Initials Name Provider Type     Mallika Alonzo OT Occupational Therapist                   Obj/Interventions       Row Name 11/29/23 1459          Sensory Assessment (Somatosensory)    Sensory Assessment (Somatosensory) UE sensation intact  -       Row Name 11/29/23 1459          Vision Assessment/Intervention    Visual Impairment/Limitations WFL  -       Row Name 11/29/23 1459          Range of Motion Comprehensive    General Range of Motion bilateral upper extremity ROM L  -       Row Name 11/29/23 1459          Strength Comprehensive (MMT)    Comment, General Manual Muscle Testing (MMT) Assessment BUE 4/5  -       Row Name 11/29/23 1459          Motor Skills    Motor Skills coordination;functional endurance  -     Coordination WFL  -     Functional Endurance fair  -       Row Name 11/29/23 1459          Balance    Balance Assessment standing dynamic balance  -     Dynamic Standing Balance contact guard;verbal cues;1-person assist  -     Position/Device Used, Standing Balance supported  -     Balance Interventions standing;sit to stand;supported;dynamic;minimal challenge;occupation based/functional task  -               User Key  (r) = Recorded By, (t) = Taken By, (c) = Cosigned By      Initials Name Provider Type     Mallika Alonzo OT Occupational Therapist                   Goals/Plan       Row Name 11/29/23 1501          Transfer Goal 1 (OT)    Activity/Assistive Device (Transfer Goal 1, OT) transfers, all  -AC     Time Frame (Transfer Goal 1, OT) long  term goal (LTG);10 days  -AC       Row Name 11/29/23 1501          Bathing Goal 1 (OT)    Activity/Device (Bathing Goal 1, OT) bathing skills, all  -AC     [place] Level/Cues Needed (Bathing Goal 1, OT) modified independence  -AC     Time Frame (Bathing Goal 1, OT) long term goal (LTG);10 days  -AC       Row Name 11/29/23 1501          Dressing Goal 1 (OT)    Activity/Device (Dressing Goal 1, OT) dressing skills, all  -AC     [place]/Cues Needed (Dressing Goal 1, OT) modified independence  -AC     Time Frame (Dressing Goal 1, OT) long term goal (LTG);10 days  -AC       Row Name 11/29/23 1501          Toileting Goal 1 (OT)    Activity/Device (Toileting Goal 1, OT) toileting skills, all  -AC     [place] Level/Cues Needed (Toileting Goal 1, OT) modified independence  -AC     Time Frame (Toileting Goal 1, OT) long term goal (LTG);10 days  -AC       Row Name 11/29/23 1501          Strength Goal 1 (OT)    Strength Goal 1 (OT) patient will demonstrate good activity tolerance for adls.  -AC     Time Frame (Strength Goal 1, OT) long term goal (LTG);10 days  -AC       Row Name 11/29/23 1501          Problem Specific Goal 1 (OT)    Problem Specific Goal 1 (OT) patient will improve endurance to good for adls.  -AC     Time Frame (Problem Specific Goal 1, OT) long term goal (LTG);10 days  -AC       Row Name 11/29/23 1501          Therapy Assessment/Plan (OT)    Planned Therapy Interventions (OT) activity tolerance training;functional balance retraining;occupation/activity based interventions;ROM/therapeutic exercise;transfer/mobility retraining;patient/caregiver education/training;BADL retraining  -AC               User Key  (r) = Recorded By, (t) = Taken By, (c) = Cosigned By      Initials Name Provider Type    AC Mallika Alonzo OT Occupational Therapist                   Clinical Impression       Row Name 11/29/23 0367          Pain Assessment    Pretreatment Pain Rating 0/10 - no pain  -AC     Posttreatment  Pain Rating 0/10 - no pain  -       Row Name 11/29/23 1459          Plan of Care Review    Plan of Care Reviewed With patient;family  -     Progress no change  -     Outcome Evaluation Patient presents with balance and endurance limitations that impede his/her ability to perform ADLS. The skills of a therapist are necessary to maximize independence with ADLs.  -       Row Name 11/29/23 1456          Therapy Assessment/Plan (OT)    Patient/Family Therapy Goal Statement (OT) Patient would like to maximize independence with adls.  -AC     Rehab Potential (OT) good, to achieve stated therapy goals  -     Criteria for Skilled Therapeutic Interventions Met (OT) yes;meets criteria;skilled treatment is necessary  -     Therapy Frequency (OT) 5 times/wk  -       Row Name 11/29/23 7922          Therapy Plan Review/Discharge Plan (OT)    Equipment Needs Upon Discharge (OT) walker, rolling  -AC     Anticipated Discharge Disposition (OT) inpatient rehabilitation facility  -       Row Name 11/29/23 1454          Positioning and Restraints    Pre-Treatment Position in bed  -AC     Post Treatment Position bed  -AC     In Bed fowlers;with family/caregiver;exit alarm on;call light within reach;encouraged to call for assist  -AC               User Key  (r) = Recorded By, (t) = Taken By, (c) = Cosigned By      Initials Name Provider Type    AC Mallika Alonzo OT Occupational Therapist                   Outcome Measures       Row Name 11/29/23 9165          How much help from another is currently needed...    Putting on and taking off regular lower body clothing? 3  -AC     Bathing (including washing, rinsing, and drying) 3  -AC     Toileting (which includes using toilet bed pan or urinal) 3  -AC     Putting on and taking off regular upper body clothing 4  -AC     Taking care of personal grooming (such as brushing teeth) 4  -AC     Eating meals 4  -AC     AM-PAC 6 Clicks Score (OT) 21  -       Row Name 11/29/23 2006           How much help from another person do you currently need...    Turning from your back to your side while in flat bed without using bedrails? 4  -MD     Moving from lying on back to sitting on the side of a flat bed without bedrails? 4  -MD     Moving to and from a bed to a chair (including a wheelchair)? 3  -MD     Standing up from a chair using your arms (e.g., wheelchair, bedside chair)? 3  -MD     Climbing 3-5 steps with a railing? 3  -MD     To walk in hospital room? 3  -MD     AM-PAC 6 Clicks Score (PT) 20  -MD     Highest Level of Mobility Goal 6 --> Walk 10 steps or more  -MD       Row Name 11/29/23 1502          Functional Assessment    Outcome Measure Options AM-PAC 6 Clicks Daily Activity (OT);Optimal Instrument  -AC       Row Name 11/29/23 1502          Optimal Instrument    Optimal Instrument Optimal - 3  -AC     Bending/Stooping 2  -AC     Standing 2  -AC     Reaching 1  -AC     From the list, choose the 3 activities you would most like to be able to do without any difficulty Bending/stooping;Standing;Reaching  -AC     Total Score Optimal - 3 5  -AC               User Key  (r) = Recorded By, (t) = Taken By, (c) = Cosigned By      Initials Name Provider Type    Mallika Pierson OT Occupational Therapist    Jaden Nava, RN Registered Nurse                    Occupational Therapy Education       Title: PT OT SLP Therapies (Done)       Topic: Occupational Therapy (Done)       Point: ADL training (Done)       Description:   Instruct learner(s) on proper safety adaptation and remediation techniques during self care or transfers.   Instruct in proper use of assistive devices.                  Learning Progress Summary             Patient Acceptance, E, VU by PHILIP at 11/29/2023 1504                         Point: Home exercise program (Done)       Description:   Instruct learner(s) on appropriate technique for monitoring, assisting and/or progressing therapeutic exercises/activities.                   Learning Progress Summary             Patient Acceptance, E, VU by  at 11/29/2023 1504                         Point: Precautions (Done)       Description:   Instruct learner(s) on prescribed precautions during self-care and functional transfers.                  Learning Progress Summary             Patient Acceptance, E, VU by  at 11/29/2023 1504                         Point: Body mechanics (Done)       Description:   Instruct learner(s) on proper positioning and spine alignment during self-care, functional mobility activities and/or exercises.                  Learning Progress Summary             Patient Acceptance, E, VU by  at 11/29/2023 1504                                         User Key       Initials Effective Dates Name Provider Type Discipline     06/16/21 -  Mallika Alonzo OT Occupational Therapist OT                  OT Recommendation and Plan  Planned Therapy Interventions (OT): activity tolerance training, functional balance retraining, occupation/activity based interventions, ROM/therapeutic exercise, transfer/mobility retraining, patient/caregiver education/training, BADL retraining  Therapy Frequency (OT): 5 times/wk  Plan of Care Review  Plan of Care Reviewed With: patient, family  Progress: no change  Outcome Evaluation: Patient presents with balance and endurance limitations that impede his/her ability to perform ADLS. The skills of a therapist are necessary to maximize independence with ADLs.     Time Calculation:   Evaluation Complexity (OT)  Review Occupational Profile/Medical/Therapy History Complexity: brief/low complexity  Assessment, Occupational Performance/Identification of Deficit Complexity: 1-3 performance deficits  Clinical Decision Making Complexity (OT): problem focused assessment/low complexity  Overall Complexity of Evaluation (OT): low complexity     Time Calculation- OT       Row Name 11/29/23 1505             Time Calculation- OT    OT Received On 11/29/23   -AC      OT Goal Re-Cert Due Date 12/08/23  -AC         Untimed Charges    OT Eval/Re-eval Minutes 28  -AC         Total Minutes    Untimed Charges Total Minutes 28  -AC       Total Minutes 28  -AC                User Key  (r) = Recorded By, (t) = Taken By, (c) = Cosigned By      Initials Name Provider Type    Mallika Pierson OT Occupational Therapist                  Therapy Charges for Today       Code Description Service Date Service Provider Modifiers Qty    [id number] HC OT EVAL LOW COMPLEXITY 2 11/29/2023 Mallika Alonzo OT GO 1                 Mallika Alonzo OT  11/29/2023

## 2023-11-29 NOTE — PLAN OF CARE
Goal Outcome Evaluation:  Plan of Care Reviewed With: patient, family        Progress: no change  Outcome Evaluation: Patient presents with balance and endurance limitations that impede his/her ability to perform ADLS. The skills of a therapist are necessary to maximize independence with ADLs.      Anticipated Discharge Disposition (OT): inpatient rehabilitation facility

## 2023-11-29 NOTE — PAYOR COMM NOTE
"PATIENT INFORMATION  Name:  Gabriella Jha  MRN#:     [id number]  :  1960         ADMISSION INFORMATION  CLASS: Inpatient   DOS:  23        CURRENT ATTENDING PROVIDER INFORMATION  Name/NPI: Brandon Fenton MD [[id number]]  Phone:  Phone: [phone]        RENDERING FACILITY  Name:  [facility]   NPI:  [id number]  TID:  [id number]  Address:      [address] [place], [place] [address] [address]  Phone  [phone]        CASE MANAGEMENT CONTACT INFORMATION  Phone:      [phone]  Fax:           [phone]        ADMISSION DIAGNOSIS  Bronchiolitis [J21.9]  Hypoxia [R09.02]  Right foot pain [M79.671]  Traumatic ecchymosis of right foot, initial encounter [S90.31XA]  Nondisplaced fracture of fifth metatarsal bone, unspecified foot, initial encounter for closed fracture [S92.356A]  Right middle lobe pulmonary nodule [R91.1]        Gabriella Jha (63 y.o. Female)       Date of Birth   1960    Social Security Number       Address   [address] [id number]    Home Phone       MRN   [id number]       Anglican   Other    Marital Status                               Admission Date   23    Admission Type   Emergency    Admitting Provider   Marc Evans MD    Attending Provider   Brandon Fenton MD    Department, Room/Bed   [address], 3009/1       Discharge Date       Discharge Disposition       Discharge Destination                                 Attending Provider: Brandon Fenton MD    Allergies: Clindamycin Hcl    Isolation: None   Infection: None   Code Status: CPR    Ht: 154.9 cm (61\")   Wt: 54.1 kg (119 lb 4.3 oz)    Admission Cmt: None   Principal Problem: Hypoxia [R09.02]                   Active Insurance as of 2023       Primary Coverage       Payor Plan Insurance Group Employer/Plan Group    [employer] BLUE CROSS AN[employer] BLUE CROSS BLUE SHIELD PPO [id number]       Payor Plan Address Payor Plan Phone Number " Payor Plan Fax Number Effective Dates    [address] [phone]  2019 - None Entered    [facility] [id number]         Subscriber Name Subscriber Birth Date Member ID       GABRIELLA JOHN 1960 [id number]                     Emergency Contacts        (Rel.) Home Phone Work Phone Mobile Phone    Jaci,April (Daughter) [phone] -- --                 History & Physical        Marc Evans MD at 23              [facility]    Patient Identification:  Name:  Gabriella John  Age:  63 y.o.  Sex:  female  :  1960  MRN:  [id number]   Visit Number:  [id number]  Admit Date: 2023   Room number:    Primary Care Physician:  Carolina Parra APRN    Date of Admission: 2023     Subjective     Chief complaint:    Chief Complaint   Patient presents with    Foot Pain    Shortness of Breath       History of presenting illness:    This is a 63-year-old female with a past medical history of smoker, COPD, hypertension, anxiety, depression, history of CVA and lung cancer presenting with hypoxia. Pt states that 3 days ago, she tripped while walking and fell to the floor. She presented to an urgent care today for right toe pain. While at the urgent care, her oxygen saturation was ranging from 50 to 70% on room air.  She was hooked up to 2 L of oxygen via nasal cannula in exam room with oxygen saturations ranging from 85 to 90% on the 2 L.  Upon O2 walk test on room air, her oxygen saturation was dipping to the upper 70s. She was transferred to our hospital for further management.  At this time, she denies any chest pain, SOB, N/V, abdominal pain or headaches    In ED, blood pressure 107/62, heart rate 93, respiratory rate of 28, temperature 96.8 and O2 saturation 70% on room air.  Labs on arrival showed a sodium 138, thousand 3.9, BUN 19, creatinine 0.67, WBC 6.2, hemoglobin 9.8 and platelet count 193.  Chest x-ray showed no active  pulmonary process.  Right foot x-ray showed nondisplaced fractures of the base of the right fifth metatarsal.  CT chest showed no evidence of pulmonary embolism.  Findings of bronchitis and bilateral hilar lymph node enlargements.    Assessment:  Bronchitis  Hypoxia  COPD  Right fifth metatarsal fracture  Anemia  Anxiety  Depression  History of CVA    Plan:  -Admit to inpatient  -DuoNebs every 4 hours  -Titrate oxygen to SaO2 of 88% 92%  -Follow-up sputum and blood cultures  -r/o MRSA, Legionella, Strep pneumo, Mycoplasma  -Solu-Medrol 40 q8h  -Ceftriaxone plus Doxy  -Monitor SaO2        ---------------------------------------------------------------------------------------------------------------------   Review of Systems   Constitutional:  Negative for chills and fatigue.   HENT:  Negative for drooling, nosebleeds and sore throat.    Eyes:  Negative for redness.   Respiratory:  Positive for cough, shortness of breath and wheezing.    Cardiovascular:  Negative for leg swelling.   Gastrointestinal:  Negative for blood in stool and rectal pain.   Endocrine: Negative for polydipsia.   Genitourinary:  Negative for dyspareunia and menstrual problem.   Musculoskeletal:  Negative for gait problem.   Neurological:  Negative for facial asymmetry and speech difficulty.   Psychiatric/Behavioral:  Negative for confusion.      ---------------------------------------------------------------------------------------------------------------------   Past Medical History:   Diagnosis Date    Anxiety     CHF (congestive heart failure)     COPD (chronic obstructive pulmonary disease)     GERD (gastroesophageal reflux disease)     Hyperlipemia     Hypertension     Lung cancer     Migraines      Past Surgical History:   Procedure Laterality Date     SECTION      HYSTERECTOMY       History reviewed. No pertinent family history.  Social History     Socioeconomic History    Marital status:    Tobacco Use    Smoking status:  Every Day     Packs/day: .25     Types: Cigarettes     Start date: 11/18/1974    Smokeless tobacco: Never   Vaping Use    Vaping Use: Never used   Substance and Sexual Activity    Alcohol use: Not Currently    Drug use: Never    Sexual activity: Defer     ---------------------------------------------------------------------------------------------------------------------   Allergies:  Clindamycin hcl  ---------------------------------------------------------------------------------------------------------------------   Medications below are reported home medications pulling from within the system; at this time, these medications have not been reconciled unless otherwise specified and are in the verification process for further verifcation as current home medications.      Prior to Admission Medications       Prescriptions Last Dose Informant Patient Reported? Taking?    albuterol sulfate  (90 Base) MCG/ACT inhaler   No No    Inhale 2 puffs Every 4 (Four) Hours As Needed for Wheezing or Shortness of Air.    ARIPiprazole (ABILIFY) 30 MG tablet   Yes No    Take 1 tablet by mouth Every Night.    atorvastatin (LIPITOR) 20 MG tablet   Yes No    atorvastatin 20 mg tablet   Take 1 tablet every day by oral route.    Cholecalciferol (Vitamin D3) 1.25 MG (06363 UT) capsule   Yes No    Take 1 capsule by mouth Every 7 (Seven) Days.      clonazePAM (KlonoPIN) 1 MG tablet   Yes No    Take 1 tablet by mouth 2 (Two) Times a Day As Needed.    clopidogrel (PLAVIX) 75 MG tablet   Yes No    Take 1 tablet by mouth Daily.    gabapentin (NEURONTIN) 400 MG capsule   Yes No    Take 1 capsule by mouth 3 (Three) Times a Day.    losartan-hydrochlorothiazide (HYZAAR) 100-25 MG per tablet   Yes No    rosuvastatin (CRESTOR) 10 MG tablet   Yes No    Take 1 tablet by mouth every night at bedtime.    venlafaxine XR (EFFEXOR-XR) 150 MG 24 hr capsule   Yes No    Take 1 capsule by mouth Daily.    losartan (COZAAR) 50 MG tablet   Yes No    Take 1  tablet by mouth Daily.          Objective     Vital Signs:  Temp:  [96.8 °F (36 °C)-98.9 °F (37.2 °C)] 98.9 °F (37.2 °C)  Heart Rate:  [90-93] 93  Resp:  [18-28] 18  BP: ()/(58-62) 98/58    No data found.  SpO2:  [70 %-96 %] 93 %  on  Flow (L/min):  [3] 3;   Device (Oxygen Therapy): nasal cannula  Body mass index is 23.41 kg/m².    Wt Readings from Last 3 Encounters:   11/28/23 56.2 kg (123 lb 14.4 oz)   11/28/23 54.4 kg (120 lb)   11/13/23 53.5 kg (118 lb)      ----------------------------------------------------------------------------------------------------------------------  PHYSICAL EXAMINATION:  GENERAL: The patient is well developed and nontoxic.  HEENT: Nonicteric sclerae, PERRLA, EOMI. Oropharynx clear. Moist mucous membranes. Conjunctivae appear well perfused.  CHEST: Chest wall is nontender.  HEART: Regular rate and rhythm without murmurs.  LUNGS: Expiratory wheezing  ABDOMEN: Soft, positive bowel sounds, nontender, no organomegaly.  RECTAL: Deferred.  SKIN: No rash, no excessive bruising, petechiae, or purpura.  NEUROLOGIC: Cranial nerves II-XII intact without motor/sensory deficit.    ---------------------------------------------------------------------------------------------------------------------  --------------------------------------------------------------------------------------------------------------------  LABS:    CBC and coagulation:  Results from last 7 days   Lab Units 11/28/23  1645 11/28/23  1552   LACTATE, ARTERIAL mmol/L  --  1.20   WBC 10*3/mm3 6.27  --    HEMOGLOBIN g/dL 9.8*  --    HEMATOCRIT % 36.7  --    MCV fL 80.1  --    MCHC g/dL 26.7*  --    PLATELETS 10*3/mm3 193  --      Acid/base balance:      Renal and electrolytes:  Results from last 7 days   Lab Units 11/28/23  1719 11/28/23  1552   SODIUM mmol/L 138  --    SODIUM, VENOUS mmol/L  --  137.4   POTASSIUM mmol/L 3.9  --    POTASSIUM, VENOUS mmol/L  --  4.6   CHLORIDE mmol/L 100  --    CHLORIDE, VENOUS mmol/L  --  101  "  CO2 mmol/L 30.6*  --    BUN mg/dL 19  --    CREATININE mg/dL 0.67  --    CALCIUM mg/dL 8.5*  --    IONIZED CALCIUM mmol/L  --  1.06*   GLUCOSE mg/dL 80  --    GLUCOSE, ARTERIAL mg/dL  --  76     Estimated Creatinine Clearance: 75.3 mL/min (by C-G formula based on SCr of 0.67 mg/dL).    Liver and pancreatic function:  Results from last 7 days   Lab Units 11/28/23  1719   ALBUMIN g/dL 3.3*   BILIRUBIN mg/dL 0.2   ALK PHOS U/L 80   AST (SGOT) U/L 14   ALT (SGPT) U/L 6     Endocrine function:  No results found for: \"HGBA1C\"  Point of care bedside glucose levels:      Lab Results   Component Value Date    TSH 2.690 02/17/2020    FREET4 0.9 02/17/2020     Cardiac:        Cultures:  Lab Results   Component Value Date    COLORU Yellow 07/03/2023    CLARITYU Clear 07/03/2023    PHUR 5.5 07/03/2023    GLUCOSEU Negative 07/03/2023    KETONESU Negative 07/03/2023    BLOODU Trace (A) 07/03/2023    NITRITEU Negative 07/03/2023    LEUKOCYTESUR Moderate (2+) (A) 07/03/2023    BILIRUBINUR Negative 07/03/2023    UROBILINOGEN 0.2 E.U./dL 07/03/2023    RBCUA 0-2 (A) 07/03/2023    WBCUA 6-12 (A) 07/03/2023    BACTERIA None Seen 07/03/2023     Microbiology Results (last 10 days)       Procedure Component Value - Date/Time    COVID PRE-OP / PRE-PROCEDURE SCREENING ORDER (NO ISOLATION) - Swab, Nasopharynx [[id number]]  (Normal) Collected: 11/28/23 1559    Lab Status: Final result Specimen: Swab from Nasopharynx Updated: 11/28/23 1643    Narrative:      The following orders were created for panel order COVID PRE-OP / PRE-PROCEDURE SCREENING ORDER (NO ISOLATION) - Swab, Nasopharynx.  Procedure                               Abnormality         Status                     ---------                               -----------         ------                     COVID-19, FLU A/B, RSV P...[[id number]]  Normal              Final result                 Please view results for these tests on the individual orders.    COVID-19, FLU A/B, RSV PCR 1 HR TAT " "- Swab, Nasopharynx [[id number]]  (Normal) Collected: 11/28/23 1559    Lab Status: Final result Specimen: Swab from Nasopharynx Updated: 11/28/23 1643     COVID19 Not Detected     Influenza A PCR Not Detected     Influenza B PCR Not Detected     RSV, PCR Not Detected    Narrative:      Fact sheet for providers: https://www.fda.gov/media/236830/download    Fact sheet for patients: https://www.fda.gov/media/505302/download    Test performed by PCR.            No results found for: \"PREGTESTUR\", \"PREGSERUM\", \"HCG\", \"HCGQUANT\"  Pain Management Panel           No data to display                I have personally looked at the labs and they are summarized above.  ----------------------------------------------------------------------------------------------------------------------  Detailed radiology reports for the last 24 hours:    Imaging Results (Last 24 Hours)       Procedure Component Value Units Date/Time    CT Chest With Contrast Diagnostic [[id number]] Collected: 11/28/23 1948     Updated: 11/28/23 1951    Narrative:      PROCEDURE: CT CHEST W CONTRAST DIAGNOSTIC     COMPARISON:  None  INDICATIONS: LOW OXYGEN, PT DENIES SHORTNESS OF BREATH. r/o PE     TECHNIQUE: After obtaining the patient's consent, CT images were obtained with non-ionic   intravenous contrast material.       PROTOCOL:   Pulmonary embolism imaging protocol performed      RADIATION:   DLP: 246.5 mGy*cm    Automated exposure control was utilized to minimize radiation dose.   CONTRAST: 59 cc Isovue 370 I.V.  LABS:   eGFR: 97.7 ml/min/1.73m2     FINDINGS:   Pulmonary arteries:  Adequately opacified.  No emboli demonstrated     Sonal/mediastinum:  Interval pre-vascular lymph node enlargement, now 1.1 cm, was 8 mm.  Interval   bilateral hilar lymph node enlargement.  No pericardial effusion.  Coronary artery calcification     Lungs/pleura:  Stable left perihilar fibrosis with scarring radiating into the left upper lobe.    Bilateral bronchial wall " thickening.  Prominent tree-in-bud opacities in the right lower lobe, with   scattered tree-in-bud opacities elsewhere in the lungs.  Strandy airspace disease in the lateral   right lung base that likely represents atelectasis.  5 mm nodule in the medial right middle lobe.    No pleural effusion     Upper abdomen:  No acute abnormality.  Cysts in the liver     Bones/soft tissues:  No acute bony abnormality.       Impression:            1. No evidence of pulmonary embolism     2. Findings of bronchitis, with bronchiolitis changes that are most pronounced in the right lower   lobe     3. Pre-vascular and bilateral hilar lymph node enlargement which may be reactive or metastatic,   recommend attention on follow-up     4. 5 mm nodule in the medial right middle lobe that is probably stable             EDY WOODY MD         Electronically Signed and Approved By: EDY WOODY MD on 11/28/2023 at 19:48                     XR Chest 1 View [[id number]] Collected: 11/28/23 1610     Updated: 11/28/23 1613    Narrative:      PROCEDURE: XR CHEST 1 VW     COMPARISON: [facility], CT, CT CHEST W CONTRAST DIAGNOSTIC, 10/31/2023, 16:09.     INDICATIONS: SHORTNESS OF BREATH TODAY     FINDINGS:   Densely calcified right lower lobe granuloma.  Linear scarring/atelectasis in the left upper lobe   likely related to post treatment related changes.  There is underlying emphysema.  Negative for   lobar infiltrate, large effusion, edema or pneumothorax.  Heart size within normal limits.    Degenerative changes within thoracic spine with mild dextrocurvature.  Aortic atherosclerotic   disease.       Impression:         No active pulmonary process.  Chronic findings as above.                  KIZZY TRUJILLO MD         Electronically Signed and Approved By: KIZZY TRUJILLO MD on 11/28/2023 at 16:10                     XR Foot 3+ View Right [[id number]] Collected: 11/28/23 1609     Updated: 11/28/23 1612    Narrative:       PROCEDURE: XR FOOT 3+ VW RIGHT     COMPARISON: [facility], CR, FOOT >OR= 3V RT, 6/17/2019, 18:14.     INDICATIONS: GENERALIZED RIGHT FOOT PAIN AFTER FALL TODAY     FINDINGS:   BONES: There is a nondisplaced fracture at the base of the right 5th metatarsal.  There is a   plantar calcaneal enthesophyte.  SOFT TISSUES: Mild soft tissue swelling is present.  EFFUSION: None visible.    OTHER: Negative.         Impression:       Nondisplaced fractures of the base of the right 5th metatarsal.               JUAN CABRERA MD         Electronically Signed and Approved By: JUAN CABRERA MD on 11/28/2023 at 16:09                           Final impressions for the last 30 days of radiology reports:    CT Chest With Contrast Diagnostic    Result Date: 11/28/2023     1. No evidence of pulmonary embolism  2. Findings of bronchitis, with bronchiolitis changes that are most pronounced in the right lower lobe  3. Pre-vascular and bilateral hilar lymph node enlargement which may be reactive or metastatic, recommend attention on follow-up  4. 5 mm nodule in the medial right middle lobe that is probably stable     EDY WOODY MD       Electronically Signed and Approved By: EDY WOODY MD on 11/28/2023 at 19:48             XR Chest 1 View    Result Date: 11/28/2023    No active pulmonary process.  Chronic findings as above.       KIZZY TRUJILLO MD       Electronically Signed and Approved By: KIZZY TRUJILLO MD on 11/28/2023 at 16:10             XR Foot 3+ View Right    Result Date: 11/28/2023   Nondisplaced fractures of the base of the right 5th metatarsal.      JUAN CABRERA MD       Electronically Signed and Approved By: JUAN CABRERA MD on 11/28/2023 at 16:09             CT Chest With Contrast Diagnostic    Result Date: 11/1/2023    1. Stable 8 mm prevascular lymph node.  No new or enlarging mediastinal or hilar nodes. 2. Stable treatment related change in the left perihilar region.  3. No evidence of  metastatic disease in chest.     TERE CISNEROS MD       Electronically Signed and Approved By: TERE CISNEROS MD on 11/01/2023 at 0:33                Assessment & Plan       This is a 63-year-old female with a past medical history of COPD, hypertension, anxiety, depression, history of CVA and lung cancer presenting with shortness of breath.     Assessment:  COPD exacerbation  Bronchitis  Hypoxia  Right fifth metatarsal fracture  Anemia  Anxiety  Depression  History of CVA    Plan:  -Admit to inpatient  -DuoNebs every 4 hours  -Titrate oxygen to SaO2 of 88% 92%  -Follow-up sputum and blood cultures  -Solu-Medrol 40 q8h  -Ceftriaxone plus Doxy  -Monitor SaO2  -Pain management   -Monitor CBC and BMP    Marc Evans MD  [facility]  11/28/23  20:22 EST      Electronically signed by Marc Evans MD at 11/28/23 2102          Emergency Department Notes        Margo Samson APRN at 11/28/23 1507          Time: 3:07 PM EST  Date of encounter:  11/28/2023  Room 26  Independent Historian/Clinical History and Information was obtained by:   Patient    History is limited by: N/A    Chief Complaint: right foot pain      History of Present Illness:  Patient is a 63 y.o. year old female who presents to the emergency department for evaluation of right foot pain.  Patient reports falling 2 days ago and twisting her right ankle.  She presented to local urgent care center for her pain and discomfort however when they obtained triage vital signs they were unable to obtain a pulse oximetry reading.  She was referred here to the ER for poor oxygenation as well as her foot pain.  Patient has a history of COPD but denies any shortness of air at this time and states she does not feel any different than normally as far as respiratory.  She rates her foot pain as a 10.    HPI    Patient Care Team  Primary Care Provider: Carolina Parra APRN    Past Medical History:     Allergies   Allergen Reactions    Clindamycin  Hcl GI Intolerance     Past Medical History:   Diagnosis Date    Anxiety     CHF (congestive heart failure)     COPD (chronic obstructive pulmonary disease)     GERD (gastroesophageal reflux disease)     Hyperlipemia     Hypertension     Lung cancer     Migraines      Past Surgical History:   Procedure Laterality Date     SECTION      HYSTERECTOMY       History reviewed. No pertinent family history.    Home Medications:  Prior to Admission medications    Medication Sig Start Date End Date Taking? Authorizing Provider   albuterol sulfate  (90 Base) MCG/ACT inhaler Inhale 2 puffs Every 4 (Four) Hours As Needed for Wheezing or Shortness of Air. 23   Marcia Pardo APRN   ARIPiprazole (ABILIFY) 30 MG tablet Take 1 tablet by mouth Every Night. 9/10/21   Estefany Washington MD   atorvastatin (LIPITOR) 20 MG tablet atorvastatin 20 mg tablet   Take 1 tablet every day by oral route.    Estefany Washington MD   Cholecalciferol (Vitamin D3) 1.25 MG (48759 UT) capsule Take 1 capsule by mouth Every 7 (Seven) Days.   22   Estefany Washington MD   clonazePAM (KlonoPIN) 1 MG tablet Take 1 tablet by mouth 2 (Two) Times a Day As Needed.    Estefany Washington MD   clopidogrel (PLAVIX) 75 MG tablet Take 1 tablet by mouth Daily. 21   Estefany Washington MD   gabapentin (NEURONTIN) 400 MG capsule Take 1 capsule by mouth 3 (Three) Times a Day. 21   Estefany Washington MD   losartan (COZAAR) 50 MG tablet Take 1 tablet by mouth Daily.    Estefany Washington MD   losartan-hydrochlorothiazide (HYZAAR) 100-25 MG per tablet  9/3/21   Estefany Washington MD   rosuvastatin (CRESTOR) 10 MG tablet Take 1 tablet by mouth every night at bedtime. 22   Estefany Washington MD   venlafaxine XR (EFFEXOR-XR) 150 MG 24 hr capsule Take 1 capsule by mouth Daily. 9/3/21   Estefany Washington MD        Social History:   Social History     Tobacco Use    Smoking status: Every Day     Packs/day: .25      "Types: Cigarettes     Start date: 11/18/1974    Smokeless tobacco: Never   Vaping Use    Vaping Use: Never used   Substance Use Topics    Alcohol use: Not Currently    Drug use: Never         Review of Systems:  Review of Systems   Constitutional:  Negative for chills and fever.   HENT:  Negative for congestion, ear pain and sore throat.    Eyes:  Negative for pain.   Respiratory:  Negative for cough, chest tightness and shortness of breath.    Cardiovascular:  Negative for chest pain.   Gastrointestinal:  Negative for abdominal pain, diarrhea, nausea and vomiting.   Genitourinary:  Negative for flank pain and hematuria.   Musculoskeletal:  Positive for arthralgias (right foot pain). Negative for joint swelling.   Skin:  Negative for pallor.   Neurological:  Negative for seizures and headaches.   All other systems reviewed and are negative.       Physical Exam:  /74 (BP Location: Left arm, Patient Position: Lying)   Pulse 98   Temp 98.4 °F (36.9 °C) (Oral)   Resp 18   Ht 154.9 cm (61\")   Wt 54.1 kg (119 lb 4.3 oz)   SpO2 90%   BMI 22.54 kg/m²     Physical Exam  Vitals and nursing note reviewed.   Constitutional:       General: She is not in acute distress.     Appearance: Normal appearance. She is not ill-appearing, toxic-appearing or diaphoretic.   HENT:      Head: Normocephalic and atraumatic.      Mouth/Throat:      Mouth: Mucous membranes are moist.   Eyes:      General: No scleral icterus.  Cardiovascular:      Rate and Rhythm: Normal rate and regular rhythm.      Pulses: Normal pulses.      Heart sounds: Normal heart sounds.   Pulmonary:      Effort: Tachypnea and accessory muscle usage (mild accessary muscle use noted.) present. No bradypnea or respiratory distress.      Breath sounds: Examination of the right-upper field reveals wheezing. Examination of the left-upper field reveals wheezing. Decreased breath sounds and wheezing present.      Comments: Pt denies any SOA and states she feels that " same as she always does.   Abdominal:      General: Abdomen is flat.      Palpations: Abdomen is soft.      Tenderness: There is no abdominal tenderness.   Musculoskeletal:         General: Normal range of motion.      Cervical back: Normal range of motion and neck supple.      Right lower leg: No tenderness. No edema.      Left lower leg: No tenderness. No edema.   Skin:     General: Skin is warm and dry.   Neurological:      Mental Status: She is alert and oriented to person, place, and time. Mental status is at baseline.                Procedures:  Procedures      Medical Decision Making:      Comorbidities that affect care:    Cancer, COPD    External Notes reviewed:    Previous Clinic Note: 11/28/2023 and Previous Radiological Studies: 11/7/2023      The following orders were placed and all results were independently analyzed by me:  Orders Placed This Encounter   Procedures    COVID PRE-OP / PRE-PROCEDURE SCREENING ORDER (NO ISOLATION) - Swab, Nasopharynx    COVID-19, FLU A/B, RSV PCR 1 HR TAT - Swab, Nasopharynx    Blood Culture - Blood,    Blood Culture - Blood,    Legionella Antigen, Urine - Urine, Urine, Clean Catch    MRSA Screen, PCR (Inpatient) - Swab, Nares    Mycoplasma Pneumoniae Antibody, IgM - Blood, Arm, Left    Respiratory Culture - Sputum, Cough    S. Pneumo Ag Urine or CSF - Urine, Urine, Clean Catch    XR Foot 3+ View Right    XR Chest 1 View    CT Chest With Contrast Diagnostic    Comprehensive Metabolic Panel    VBG with Co-Ox and Electrolytes    CBC Auto Differential    Scan Slide    Basic Metabolic Panel    Lactic Acid, Plasma    CBC Auto Differential    Procalcitonin    Iron Profile    Occult Blood, Fecal By Immunoassay - Stool, Per Rectum    Ambulatory Referral to Pulmonary/COPD Clinic    Diet: Regular/House Diet; Texture: Regular Texture (IDDSI 7); Fluid Consistency: Thin (IDDSI 0)    If unable to obtain O2 saturation reading with pulse ox VBG  [facility] [place] [facility] [place]Specify)    Check  Pulse Oximetry while ambulating    Obtain & Apply The Following- Lower extremity; Walking boot    Obtain & Apply The Following- Lower extremity; Walking boot    Vital Signs    Intake & Output    Weigh Patient    Oral Care    Code Status and Medical Interventions:    IP General Consult (Use specialty-specific consult if known)    Inpatient RT Case Management Consult    Inpatient Pulmonary Rehab Consult    OT Consult: Eval & Treat    PT Consult: Eval & Treat Discharge Placement Assessment    RT to Initiate Bronchodilator Protocol    RT to Initiate Bronchopulmonary Hygiene Protocol    Oscillating Positive Expiratory Pressure (OPEP)    Walking Oximetry    Insert Peripheral IV    Inpatient Admission    CBC & Differential    CBC & Differential       Medications Given in the Emergency Department:  Medications   sodium chloride 0.9 % flush 10 mL (10 mL Intravenous Given 11/29/23 0853)   sodium chloride 0.9 % flush 10 mL (has no administration in time range)   sodium chloride 0.9 % infusion 40 mL (has no administration in time range)   sennosides-docusate (PERICOLACE) 8.6-50 MG per tablet 2 tablet (2 tablets Oral Given 11/29/23 0853)     And   polyethylene glycol (MIRALAX) packet 17 g (has no administration in time range)     And   bisacodyl (DULCOLAX) EC tablet 5 mg (has no administration in time range)     And   bisacodyl (DULCOLAX) suppository 10 mg (has no administration in time range)   heparin (porcine) 5000 UNIT/ML injection 5,000 Units (5,000 Units Subcutaneous Given 11/29/23 0853)   methylPREDNISolone sodium succinate (SOLU-Medrol) injection 40 mg (40 mg Intravenous Given 11/29/23 1246)   ipratropium-albuterol (DUO-NEB) nebulizer solution 3 mL (3 mL Nebulization Given 11/29/23 1208)   cefTRIAXone (ROCEPHIN) IVPB 1,000 mg (0 mg Intravenous Stopped 11/28/23 2233)   acetaminophen (TYLENOL) tablet 650 mg (has no administration in time range)   !Patient Home Medications Stored in Pharmacy ( Does not apply Not Given  11/29/23 1003)   azithromycin (ZITHROMAX) tablet 500 mg (500 mg Oral Given 11/29/23 1041)     Followed by   azithromycin (ZITHROMAX) tablet 250 mg (has no administration in time range)   budesonide (PULMICORT) nebulizer solution 0.5 mg (0.5 mg Nebulization Given 11/29/23 1011)   arformoterol (BROVANA) nebulizer solution 15 mcg (15 mcg Nebulization Given 11/29/23 1011)   famotidine (PEPCID) tablet 20 mg (has no administration in time range)   ARIPiprazole (ABILIFY) tablet 30 mg (has no administration in time range)   rosuvastatin (CRESTOR) tablet 10 mg (has no administration in time range)   venlafaxine XR (EFFEXOR-XR) 24 hr capsule 150 mg (150 mg Oral Given 11/29/23 1041)   clonazePAM (KlonoPIN) tablet 1 mg (has no administration in time range)   nicotine (NICODERM CQ) 21 MG/24HR patch 1 patch (1 patch Transdermal Medication Applied 11/29/23 1528)   clopidogrel (PLAVIX) tablet 75 mg (75 mg Oral Given 11/29/23 1528)   gabapentin (NEURONTIN) capsule 400 mg (400 mg Oral Given 11/29/23 1528)   ipratropium-albuterol (DUO-NEB) nebulizer solution 3 mL (3 mL Nebulization Given 11/28/23 1605)   methylPREDNISolone sodium succinate (SOLU-Medrol) injection 125 mg (125 mg Intravenous Given 11/28/23 1850)   iopamidol (ISOVUE-370) 76 % injection 100 mL (59 mL Intravenous Given 11/28/23 1930)        ED Course:    ED Course as of 11/29/23 1601   Tue Nov 28, 2023   1531 Primary RN unable to obtain O2 saturation via  pulse.  Multiple attempts made even after removing fingernail polish.  VBG to be obtained.  Patient states she used to wear home O2 approximately 5 years ago however stopped using it stating that she was instructed by provider that she no longer needed it.  She does have a history of COPD and continues to smoke approximately 10 cigarettes a day [MS]   1637 Updated pt with results. She continues to deny any SOA and states she does not feel any tightness in her chest. She denies any change in her breathing or respiratory  status. Her O2 sats are 98% on  [MS]   1811 I have discussed this pt with ER attending Dr. Soares  [MS]   1820 Patient's O2 saturation was assessed while ambulating by respiratory therapist.  Patient has significant drop at room air decreasing up to 74%.  RT placed her on 2 L which had to continuously be increased to 6 L/min via nasal cannula for patient to region O2 saturation of 91%.  Note, once patient was placed on 6 L it still took approximately 2 minutes for her oxygen saturation to increase to 91%. [MS]   2010 Order placed for hospital consult at this time.  [MS]   2014 XR Foot 3+ View Right  Has an appointment with Dr. Kelsey for 1/8/2024 but has called to see if she can have the appointment moved up.  [MS]   2032 Dr. Evans has agreed to admit the pt to hospital for hypoxia.  [MS]      ED Course User Index  [MS] Margo Samson, AMADOR       Labs:    Lab Results (last 24 hours)       Procedure Component Value Units Date/Time    CBC & Differential [[id number]]  (Abnormal) Collected: 11/28/23 1645    Specimen: Blood Updated: 11/28/23 1709    Narrative:      The following orders were created for panel order CBC & Differential.  Procedure                               Abnormality         Status                     ---------                               -----------         ------                     CBC Auto Differential[[id number]]        Abnormal            Final result               Scan Slide[[id number]]                                       Final result                 Please view results for these tests on the individual orders.    CBC Auto Differential [[id number]]  (Abnormal) Collected: 11/28/23 1645    Specimen: Blood Updated: 11/28/23 1709     WBC 6.27 10*3/mm3      RBC 4.58 10*6/mm3      Hemoglobin 9.8 g/dL      Hematocrit 36.7 %      MCV 80.1 fL      MCH 21.4 pg      MCHC 26.7 g/dL      RDW 19.2 %      RDW-SD 55.0 fl      MPV 11.1 fL      Platelets 193 10*3/mm3      Neutrophil % 78.2 %       Lymphocyte % 9.1 %      Monocyte % 9.4 %      Eosinophil % 2.2 %      Basophil % 0.8 %      Immature Grans % 0.3 %      Neutrophils, Absolute 4.90 10*3/mm3      Lymphocytes, Absolute 0.57 10*3/mm3      Monocytes, Absolute 0.59 10*3/mm3      Eosinophils, Absolute 0.14 10*3/mm3      Basophils, Absolute 0.05 10*3/mm3      Immature Grans, Absolute 0.02 10*3/mm3      nRBC 0.5 /100 WBC     Scan Slide [[id number]] Collected: 11/28/23 1645    Specimen: Blood Updated: 11/28/23 1709     Anisocytosis Slight/1+     Hypochromia Mod/2+     Microcytes Mod/2+     Polychromasia Slight/1+     WBC Morphology Normal     Platelet Estimate Adequate     Large Platelets Slight/1+    Comprehensive Metabolic Panel [[id number]]  (Abnormal) Collected: 11/28/23 1719    Specimen: Blood from Arm, Right Updated: 11/28/23 1752     Glucose 80 mg/dL      BUN 19 mg/dL      Creatinine 0.67 mg/dL      Sodium 138 mmol/L      Potassium 3.9 mmol/L      Chloride 100 mmol/L      CO2 30.6 mmol/L      Calcium 8.5 mg/dL      Total Protein 6.3 g/dL      Albumin 3.3 g/dL      ALT (SGPT) 6 U/L      AST (SGOT) 14 U/L      Alkaline Phosphatase 80 U/L      Total Bilirubin 0.2 mg/dL      Globulin 3.0 gm/dL      A/G Ratio 1.1 g/dL      BUN/Creatinine Ratio 28.4     Anion Gap 7.4 mmol/L      eGFR 97.7 mL/min/1.73     Narrative:      GFR Normal >60  Chronic Kidney Disease <60  Kidney Failure <15      Lactic Acid, Plasma [[id number]]  (Normal) Collected: 11/28/23 2123    Specimen: Blood Updated: 11/28/23 2157     Lactate 0.8 mmol/L     Blood Culture - Blood, Arm, Right [[id number]] Collected: 11/28/23 2123    Specimen: Blood from Arm, Right Updated: 11/28/23 2128    Blood Culture - Blood, Arm, Right [[id number]] Collected: 11/28/23 2140    Specimen: Blood from Arm, Right Updated: 11/28/23 2146    Mycoplasma Pneumoniae Antibody, IgM - Blood, Arm, Left [[id number]]  (Normal) Collected: 11/28/23 9220    Specimen: Blood from Arm, Left Updated: 11/28/23 8834     Mycoplasma pneumo  IgM Negative    MRSA Screen, PCR (Inpatient) - Swab, Nares [[id number]]  (Normal) Collected: 11/29/23 0101    Specimen: Swab from Nares Updated: 11/29/23 0248     MRSA PCR No MRSA Detected    Narrative:      The negative predictive value of this diagnostic test is high and should only be used to consider de-escalating anti-MRSA therapy. A positive result may indicate colonization with MRSA and must be correlated clinically.    Basic Metabolic Panel [[id number]]  (Abnormal) Collected: 11/29/23 0611    Specimen: Blood from Hand, Right Updated: 11/29/23 0704     Glucose 165 mg/dL      BUN 16 mg/dL      Creatinine 0.63 mg/dL      Sodium 138 mmol/L      Potassium 4.2 mmol/L      Chloride 100 mmol/L      CO2 31.7 mmol/L      Calcium 8.3 mg/dL      BUN/Creatinine Ratio 25.4     Anion Gap 6.3 mmol/L      eGFR 99.2 mL/min/1.73     Narrative:      GFR Normal >60  Chronic Kidney Disease <60  Kidney Failure <15      CBC & Differential [[id number]]  (Abnormal) Collected: 11/29/23 0611    Specimen: Blood from Hand, Right Updated: 11/29/23 0627    Narrative:      The following orders were created for panel order CBC & Differential.  Procedure                               Abnormality         Status                     ---------                               -----------         ------                     CBC Auto Differential[[id number]]        Abnormal            Final result                 Please view results for these tests on the individual orders.    CBC Auto Differential [[id number]]  (Abnormal) Collected: 11/29/23 0611    Specimen: Blood from Hand, Right Updated: 11/29/23 0627     WBC 4.93 10*3/mm3      RBC 4.59 10*6/mm3      Hemoglobin 9.6 g/dL      Hematocrit 37.2 %      MCV 81.0 fL      MCH 20.9 pg      MCHC 25.8 g/dL      RDW 18.9 %      RDW-SD 55.6 fl      MPV 10.5 fL      Platelets 178 10*3/mm3      Neutrophil % 91.9 %      Lymphocyte % 4.5 %      Monocyte % 2.2 %      Eosinophil % 0.0 %      Basophil % 0.2 %      Immature  "Grans % 1.2 %      Neutrophils, Absolute 4.53 10*3/mm3      Lymphocytes, Absolute 0.22 10*3/mm3      Monocytes, Absolute 0.11 10*3/mm3      Eosinophils, Absolute 0.00 10*3/mm3      Basophils, Absolute 0.01 10*3/mm3      Immature Grans, Absolute 0.06 10*3/mm3      nRBC 1.4 /100 WBC     Procalcitonin [[id number]]  (Normal) Collected: 11/29/23 0611    Specimen: Blood from Hand, Right Updated: 11/29/23 0957     Procalcitonin 0.10 ng/mL     Narrative:      As a Marker for Sepsis (Non-Neonates):    1. <0.5 ng/mL represents a low risk of severe sepsis and/or septic shock.  2. >2 ng/mL represents a high risk of severe sepsis and/or septic shock.    As a Marker for Lower Respiratory Tract Infections that require antibiotic therapy:    PCT on Admission    Antibiotic Therapy       6-12 Hrs later    >0.5                Strongly Recommended  >0.25 - <0.5        Recommended  0.1 - 0.25          Discouraged              Remeasure/reassess PCT  <0.1                Strongly Discouraged     Remeasure/reassess PCT    As 28 day mortality risk marker: \"Change in Procalcitonin Result\" (>80% or <=80%) if Day 0 (or Day 1) and Day 4 values are available. Refer to [email or web address]    Change in PCT <=80%  A decrease of PCT levels below or equal to 80% defines a positive change in PCT test result representing a higher risk for 28-day all-cause mortality of patients diagnosed with severe sepsis for septic shock.    Change in PCT >80%  A decrease of PCT levels of more than 80% defines a negative change in PCT result representing a lower risk for 28-day all-cause mortality of patients diagnosed with severe sepsis or septic shock.    This test is Prognostic not Diagnostic, if elevated correlate with clinical findings before administering antibiotic treatment.                 Imaging:    CT Chest With Contrast Diagnostic    Result Date: 11/28/2023  PROCEDURE: CT CHEST W CONTRAST DIAGNOSTIC  COMPARISON:  None INDICATIONS: LOW " OXYGEN, PT DENIES SHORTNESS OF BREATH. r/o PE  TECHNIQUE: After obtaining the patient's consent, CT images were obtained with non-ionic intravenous contrast material.   PROTOCOL:   Pulmonary embolism imaging protocol performed    RADIATION:   DLP: 246.5 mGy*cm   Automated exposure control was utilized to minimize radiation dose. CONTRAST: 59 cc Isovue 370 I.V. LABS:   eGFR: 97.7 ml/min/1.73m2  FINDINGS:  Pulmonary arteries:  Adequately opacified.  No emboli demonstrated  Sonal/mediastinum:  Interval pre-vascular lymph node enlargement, now 1.1 cm, was 8 mm.  Interval bilateral hilar lymph node enlargement.  No pericardial effusion.  Coronary artery calcification  Lungs/pleura:  Stable left perihilar fibrosis with scarring radiating into the left upper lobe.  Bilateral bronchial wall thickening.  Prominent tree-in-bud opacities in the right lower lobe, with scattered tree-in-bud opacities elsewhere in the lungs.  Strandy airspace disease in the lateral right lung base that likely represents atelectasis.  5 mm nodule in the medial right middle lobe.  No pleural effusion  Upper abdomen:  No acute abnormality.  Cysts in the liver  Bones/soft tissues:  No acute bony abnormality.         1. No evidence of pulmonary embolism  2. Findings of bronchitis, with bronchiolitis changes that are most pronounced in the right lower lobe  3. Pre-vascular and bilateral hilar lymph node enlargement which may be reactive or metastatic, recommend attention on follow-up  4. 5 mm nodule in the medial right middle lobe that is probably stable     EDY WOODY MD       Electronically Signed and Approved By: EDY WOODY MD on 11/28/2023 at 19:48                Differential Diagnosis and Discussion:    Dyspnea: Differential diagnosis includes but is not limited to metabolic acidosis, neurological disorders, psychogenic, asthma, pneumothorax, upper airway obstruction, COPD, pneumonia, noncardiogenic pulmonary edema, interstitial lung  disease, anemia, congestive heart failure, and pulmonary embolism  Extremity Pain: Differential diagnosis includes but is not limited to soft tissue sprain, tendonitis, tendon injury, dislocation, fracture, deep vein thrombosis, arterial insufficiency, osteoarthritis, bursitis, and ligamentous damage.    All labs were reviewed and interpreted by me.  All X-rays impressions were independently interpreted by me.  CT scan radiology impression was interpreted by me.    MDM     Amount and/or Complexity of Data Reviewed  Clinical lab tests: reviewed and ordered  Tests in the radiology section of CPT®: reviewed and ordered  Decide to obtain previous medical records or to obtain history from someone other than the patient: yes  Review and summarize past medical records: yes (I have personally reviewed patient's previous medical encounters.  )    Risk of Complications, Morbidity, and/or Mortality  Presenting problems: high  Diagnostic procedures: moderate  Management options: moderate                 Patient Care Considerations:    ANTIBIOTICS: I considered prescribing antibiotics as an outpatient however patient has no indications of a bacterial infection including pneumonia.  She is afebrile and white blood cell count is within normal limits.      Consultants/Shared Management Plan:    Hospitalist: I have discussed the case with Dr. Evans who agrees to accept the patient for admission.    Social Determinants of Health:    Patient is independent, reliable, and has access to care.       Disposition and Care Coordination:    Admit:   Through independent evaluation of the patient's history, physical, and imperical data, the patient meets criteria for observation/admission to the hospital.        Final diagnoses:   Hypoxia   Nondisplaced fracture of fifth metatarsal bone, unspecified foot, initial encounter for closed fracture   Right foot pain   Traumatic ecchymosis of right foot, initial encounter   Right middle lobe  pulmonary nodule   Bronchiolitis        ED Disposition       ED Disposition   Decision to Admit    Condition   --    Comment   Level of Care: Remote Telemetry [26]   Diagnosis: Hypoxia [124001]   Admitting Physician: PAM ALONZO [[id number]]   Attending Physician: PAM ALONZO [[id number]]   Certification: I Certify That Inpatient Hospital Services Are Medically Necessary For Greater Than 2 Midnights                 This medical record created using voice recognition software.             Margo Samson APRN  11/29/23 1601      Electronically signed by Margo Samson APRN at 11/29/23 1601       Orders (active)        Start     Ordered    11/30/23 0900  azithromycin (ZITHROMAX) tablet 250 mg  Daily        See \[facility]\""pace for full Linked Orders Report.    11/29/23 0935    11/30/23 0600  Iron Profile  Morning Draw         11/29/23 0935    11/29/23 2100  ARIPiprazole (ABILIFY) tablet 30 mg  Nightly         11/29/23 0936    11/29/23 2100  rosuvastatin (CRESTOR) tablet 10 mg  Nightly         11/29/23 0936    11/29/23 1730  famotidine (PEPCID) tablet 20 mg  2 Times Daily Before Meals         11/29/23 0935    11/29/23 1600  gabapentin (NEURONTIN) capsule 400 mg  3 Times Daily         11/29/23 1322    11/29/23 1415  nicotine (NICODERM CQ) 21 MG/24HR patch 1 patch  Every 24 Hours Scheduled         11/29/23 1322    11/29/23 1415  clopidogrel (PLAVIX) tablet 75 mg  Daily         11/29/23 1322    11/29/23 1300  Oscillating Positive Expiratory Pressure (OPEP)  Every 6 Hours While Awake - RT       11/29/23 1014    11/29/23 1221  Inpatient Pulmonary Rehab Consult  Once        Provider:  (Not yet assigned)    11/29/23 1220    11/29/23 1030  budesonide (PULMICORT) nebulizer solution 0.5 mg  2 Times Daily - RT         11/29/23 0935    11/29/23 1030  arformoterol (BROVANA) nebulizer solution 15 mcg  2 Times Daily - RT         11/29/23 0935    11/29/23 1030  venlafaxine XR (EFFEXOR-XR) 24 hr capsule 150 mg  Daily          11/29/23 0936    11/29/23 1000  !Patient Home Medications Stored in Pharmacy  2 Times Daily         11/29/23 0912    11/29/23 0936  clonazePAM (KlonoPIN) tablet 1 mg  2 Times Daily PRN         11/29/23 0936    11/29/23 0936  Occult Blood, Fecal By Immunoassay - Stool, Per Rectum  Daily       11/29/23 0935    11/29/23 0934  PT Consult: Eval & Treat Discharge Placement Assessment  Once         11/29/23 0935    11/29/23 0934  OT Consult: Eval & Treat  Once         11/29/23 0935    11/29/23 0800  Oral Care  2 Times Daily       11/28/23 2255 11/29/23 0600  Basic Metabolic Panel  Daily       11/28/23 2255 11/29/23 0600  CBC & Differential  Daily       11/28/23 2255 11/29/23 0317  Legionella Antigen, Urine - Urine, Urine, Clean Catch  Once         11/28/23 2101 11/29/23 0317  S. Pneumo Ag Urine or CSF - Urine, Urine, Clean Catch  Once         11/28/23 2101 11/29/23 0000  Vital Signs  Every 4 Hours       11/28/23 2255 11/29/23 0000  Ambulatory Referral to Pulmonary/COPD Clinic         11/29/23 1224    11/28/23 2345  sodium chloride 0.9 % flush 10 mL  Every 12 Hours Scheduled         11/28/23 2255 11/28/23 2345  sennosides-docusate (PERICOLACE) 8.6-50 MG per tablet 2 tablet  2 Times Daily        See Hyperspace for full Linked Orders Report.    11/28/23 2255 11/28/23 2345  heparin (porcine) 5000 UNIT/ML injection 5,000 Units  Every 12 Hours Scheduled         11/28/23 2255 11/28/23 2256  Intake & Output  Every Shift       11/28/23 2255 11/28/23 2256  Weigh Patient  Once         11/28/23 2255 11/28/23 2256  Insert Peripheral IV  Once         11/28/23 2255 11/28/23 2256  Diet: Regular/House Diet; Texture: Regular Texture (IDDSI 7); Fluid Consistency: Thin (IDDSI 0)  Diet Effective Now         11/28/23 2255 11/28/23 2255  sodium chloride 0.9 % flush 10 mL  As Needed         11/28/23 2255    11/28/23 2255  sodium chloride 0.9 % infusion 40 mL  As Needed         11/28/23 2255    11/28/23 2257   polyethylene glycol (MIRALAX) packet 17 g  Daily PRN        See Hyperspace for full Linked Orders Report.    23  bisacodyl (DULCOLAX) EC tablet 5 mg  Daily PRN        See Hyperspace for full Linked Orders Report.    23  bisacodyl (DULCOLAX) suppository 10 mg  Daily PRN        See Hyperspace for full Linked Orders Report.    23  methylPREDNISolone sodium succinate (SOLU-Medrol) injection 40 mg  Every 8 Hours         23  ipratropium-albuterol (DUO-NEB) nebulizer solution 3 mL  4 Times Daily - RT         23  cefTRIAXone (ROCEPHIN) IVPB 1,000 mg  Every 24 Hours         23  Blood Culture - Blood, Arm, Right  Once        See Hyperspace for full Linked Orders Report.    23  Blood Culture - Blood, Arm, Right  Once        See Hyperspace for full Linked Orders Report.    23  Respiratory Culture - Sputum, Cough  Once         23  acetaminophen (TYLENOL) tablet 650 mg  Every 6 Hours PRN         23  Code Status and Medical Interventions:  Continuous         23  IP General Consult (Use specialty-specific consult if known)  Once        Provider:  Marc Evans MD    23 2014    Unscheduled  RT to Initiate Bronchodilator Protocol  Every 4 Hours PRN - RT       23 0935    Unscheduled  RT to Initiate Bronchopulmonary Hygiene Protocol  Every 4 Hours PRN - RT       23 09                     Physician Progress Notes (last 24 hours)        Brandon Fenton MD at 23 1526           [facility]ist Progress Note  Date: 2023  Patient Name: Gabriella Jha  : 1960  MRN: [id number]  Date of admission: 2023      Subjective   Subjective     Chief Complaint: Shortness of air.  Right foot  pain after fall    Summary:   This is a 63-year-old female with a past medical history of smoker, COPD, hypertension, anxiety, depression, history of CVA and lung cancer presenting with hypoxia. Pt states that 3 days ago, she tripped while walking and fell to the floor. She presented to an urgent care today for right toe pain. While at the urgent care, her oxygen saturation was ranging from 50 to 70% on room air.  She was hooked up to 2 L of oxygen via nasal cannula in exam room with oxygen saturations ranging from 85 to 90% on the 2 L.  Upon O2 walk test on room air, her oxygen saturation was dipping to the upper 70s. She was transferred to our hospital for further management.  At this time, she denies any chest pain, SOB, N/V, abdominal pain or headaches     In ED, blood pressure 107/62, heart rate 93, respiratory rate of 28, temperature 96.8 and O2 saturation 70% on room air.  Labs on arrival showed a sodium 138, thousand 3.9, BUN 19, creatinine 0.67, WBC 6.2, hemoglobin 9.8 and platelet count 193.  Chest x-ray showed no active pulmonary process.  Right foot x-ray showed nondisplaced fractures of the base of the right fifth metatarsal.  CT chest showed no evidence of pulmonary embolism.  Findings of bronchitis and bilateral hilar lymph node enlargements.  Interval Followup:   90% sats on 2 L.  No home oxygen use.  Patient was prescribed home oxygen long time ago but never used it.  Continues to have wheezing and cough.  Not able to bring up mucus.  No chest pain.  Right foot pain improving.  No more lethargic or unsteady.  Wondering when she will be going home.    Review of Systems   All systems were reviewed and negative except for: Summary and interval follow-up    Objective   Objective     Vitals:   Temp:  [98.2 °F (36.8 °C)-98.9 °F (37.2 °C)] 98.4 °F (36.9 °C)  Heart Rate:  [89-98] 98  Resp:  [18] 18  BP: (120-148)/(66-75) 132/74  Flow (L/min):  [2-4] 2  Physical Exam    Constitutional: Awake, alert, no acute  distress   Eyes: Pupils equal, sclerae anicteric, no conjunctival injection   HENT: NCAT, mucous membranes moist.  Edentulous   Neck: Supple, no thyromegaly, no lymphadenopathy, trachea midline   Respiratory: Wheezing  bilaterally, nonlabored respirations    Cardiovascular: RRR, no murmurs, rubs, or gallops, palpable pedal pulses bilaterally   Gastrointestinal: Positive bowel sounds, soft, nontender, nondistended   Musculoskeletal: Swollen, bruised and tender right lateral proximal foot area at fifth metatarsal.  No bilateral ankle edema, no clubbing or cyanosis to extremities   Psychiatric: Appropriate affect, cooperative   Neurologic: Oriented x 3, strength symmetric in all extremities, Cranial Nerves grossly intact to confrontation, speech clear   Skin: No rashes     Result Review    Result Review:  I have personally reviewed the results for the past 24 hours and agree with these findings:  [x]  Laboratory  [x]  Microbiology  [x]  Radiology  []  EKG/Telemetry   []  Cardiology/Vascular   []  Pathology  [x]  Old records  [x]  Other: Medications    Assessment & Plan   Assessment / Plan     Assessment:    Hypoxia.  No recent home oxygen use  Acute COPD exacerbation  Right fifth metatarsal fracture  Fall at home causing above  Anemia  Anxiety  Depression  History of CVA  History of lung cancer.  Bilateral hilar lymphadenopathy.  5 mm right lung nodule appears to be stable.  Tobacco abuse disorder.       Plan:  -Pulmicort and brovana neb  -DuoNebs every 4 hours  Bronchodilator and bronchopulmonary hygiene protocol  -Titrate oxygen to SaO2 of 88% 92%  -Follow-up sputum and blood cultures  -r/o MRSA, Legionella, Strep pneumo, Mycoplasma.  All negative  -Solu-Medrol 40 q8h  -Ceftriaxone plus Zithromax.  DC doxycycline  -Resumed home medication including Effexor, Crestor, Plavix, Neurontin, Klonopin and Abilify.  -Check iron profile and Hemoccult stool.  Pepcid.  Continue short leg walking boot for right foot fracture.   Minimize weightbearing.  Nicotine patch.  DC telemetry.  RT case management consulted for COPD clinic set up.  ABG noted.  Smoking cessation discussed with patient    Discussed plan withRN and .  Discharge home in next couple of days.  Will need walking oximetry prior to discharge for home oxygen set up    DVT prophylaxis:  Medical DVT prophylaxis orders are present.    CODE STATUS:   Level Of Support Discussed With: Patient  Code Status (Patient has no pulse and is not breathing): CPR (Attempt to Resuscitate)  Medical Interventions (Patient has pulse or is breathing): Full Support      Part of this note may be an electronic transcription/translation of spoken language to printed text using the Dragon Dictation System.     Electronically signed by Brandon Fenton MD, 11/29/23, 3:26 PM EST.               Electronically signed by Brandon Fenton MD at 11/29/23 153

## 2023-11-30 ENCOUNTER — READMISSION MANAGEMENT (OUTPATIENT)
Dept: CALL CENTER | Facility: HOSPITAL | Age: 63
End: 2023-11-30
Payer: COMMERCIAL

## 2023-11-30 VITALS
HEART RATE: 92 BPM | SYSTOLIC BLOOD PRESSURE: 117 MMHG | OXYGEN SATURATION: 90 % | TEMPERATURE: 98.5 F | RESPIRATION RATE: 20 BRPM | DIASTOLIC BLOOD PRESSURE: 64 MMHG | WEIGHT: 119.27 LBS | BODY MASS INDEX: 22.52 KG/M2 | HEIGHT: 61 IN

## 2023-11-30 PROBLEM — D50.9 IRON DEFICIENCY ANEMIA: Status: ACTIVE | Noted: 2023-11-30

## 2023-11-30 PROBLEM — A48.1 LEGIONELLA PNEUMONIA: Status: ACTIVE | Noted: 2023-11-30

## 2023-11-30 LAB
ANION GAP SERPL CALCULATED.3IONS-SCNC: 4.7 MMOL/L (ref 5–15)
BASOPHILS # BLD AUTO: 0.01 10*3/MM3 (ref 0–0.2)
BASOPHILS NFR BLD AUTO: 0.1 % (ref 0–1.5)
BUN SERPL-MCNC: 14 MG/DL (ref 8–23)
BUN/CREAT SERPL: 25 (ref 7–25)
CALCIUM SPEC-SCNC: 8.7 MG/DL (ref 8.6–10.5)
CHLORIDE SERPL-SCNC: 99 MMOL/L (ref 98–107)
CO2 SERPL-SCNC: 34.3 MMOL/L (ref 22–29)
CREAT SERPL-MCNC: 0.56 MG/DL (ref 0.57–1)
DEPRECATED RDW RBC AUTO: 56.2 FL (ref 37–54)
EGFRCR SERPLBLD CKD-EPI 2021: 102.1 ML/MIN/1.73
EOSINOPHIL # BLD AUTO: 0 10*3/MM3 (ref 0–0.4)
EOSINOPHIL NFR BLD AUTO: 0 % (ref 0.3–6.2)
ERYTHROCYTE [DISTWIDTH] IN BLOOD BY AUTOMATED COUNT: 18.9 % (ref 12.3–15.4)
GLUCOSE SERPL-MCNC: 107 MG/DL (ref 65–99)
HCT VFR BLD AUTO: 36.6 % (ref 34–46.6)
HGB BLD-MCNC: 9.4 G/DL (ref 12–15.9)
IMM GRANULOCYTES # BLD AUTO: 0.07 10*3/MM3 (ref 0–0.05)
IMM GRANULOCYTES NFR BLD AUTO: 1 % (ref 0–0.5)
IRON 24H UR-MRATE: 10 MCG/DL (ref 37–145)
IRON SATN MFR SERPL: 2 % (ref 20–50)
L PNEUMO1 AG UR QL IA: POSITIVE
LYMPHOCYTES # BLD AUTO: 0.31 10*3/MM3 (ref 0.7–3.1)
LYMPHOCYTES NFR BLD AUTO: 4.6 % (ref 19.6–45.3)
MCH RBC QN AUTO: 20.9 PG (ref 26.6–33)
MCHC RBC AUTO-ENTMCNC: 25.7 G/DL (ref 31.5–35.7)
MCV RBC AUTO: 81.5 FL (ref 79–97)
MONOCYTES # BLD AUTO: 0.3 10*3/MM3 (ref 0.1–0.9)
MONOCYTES NFR BLD AUTO: 4.5 % (ref 5–12)
NEUTROPHILS NFR BLD AUTO: 6 10*3/MM3 (ref 1.7–7)
NEUTROPHILS NFR BLD AUTO: 89.8 % (ref 42.7–76)
NRBC BLD AUTO-RTO: 0.7 /100 WBC (ref 0–0.2)
PLATELET # BLD AUTO: 175 10*3/MM3 (ref 140–450)
PMV BLD AUTO: 11 FL (ref 6–12)
POTASSIUM SERPL-SCNC: 4.7 MMOL/L (ref 3.5–5.2)
RBC # BLD AUTO: 4.49 10*6/MM3 (ref 3.77–5.28)
S PNEUM AG SPEC QL LA: NEGATIVE
SODIUM SERPL-SCNC: 138 MMOL/L (ref 136–145)
TIBC SERPL-MCNC: 426 MCG/DL (ref 298–536)
TRANSFERRIN SERPL-MCNC: 286 MG/DL (ref 200–360)
WBC NRBC COR # BLD AUTO: 6.69 10*3/MM3 (ref 3.4–10.8)

## 2023-11-30 PROCEDURE — 83540 ASSAY OF IRON: CPT | Performed by: INTERNAL MEDICINE

## 2023-11-30 PROCEDURE — 94799 UNLISTED PULMONARY SVC/PX: CPT

## 2023-11-30 PROCEDURE — 87449 NOS EACH ORGANISM AG IA: CPT

## 2023-11-30 PROCEDURE — 25010000002 NA FERRIC GLUC CPLX PER 12.5 MG: Performed by: INTERNAL MEDICINE

## 2023-11-30 PROCEDURE — 94760 N-INVAS EAR/PLS OXIMETRY 1: CPT

## 2023-11-30 PROCEDURE — 25810000003 SODIUM CHLORIDE 0.9 % SOLUTION: Performed by: INTERNAL MEDICINE

## 2023-11-30 PROCEDURE — 84466 ASSAY OF TRANSFERRIN: CPT | Performed by: INTERNAL MEDICINE

## 2023-11-30 PROCEDURE — 94618 PULMONARY STRESS TESTING: CPT

## 2023-11-30 PROCEDURE — 25010000002 METHYLPREDNISOLONE PER 40 MG

## 2023-11-30 PROCEDURE — 25010000002 HEPARIN (PORCINE) PER 1000 UNITS: Performed by: STUDENT IN AN ORGANIZED HEALTH CARE EDUCATION/TRAINING PROGRAM

## 2023-11-30 PROCEDURE — 97161 PT EVAL LOW COMPLEX 20 MIN: CPT

## 2023-11-30 PROCEDURE — 99239 HOSP IP/OBS DSCHRG MGMT >30: CPT | Performed by: INTERNAL MEDICINE

## 2023-11-30 PROCEDURE — 85025 COMPLETE CBC W/AUTO DIFF WBC: CPT | Performed by: STUDENT IN AN ORGANIZED HEALTH CARE EDUCATION/TRAINING PROGRAM

## 2023-11-30 PROCEDURE — 87899 AGENT NOS ASSAY W/OPTIC: CPT

## 2023-11-30 PROCEDURE — 80048 BASIC METABOLIC PNL TOTAL CA: CPT | Performed by: STUDENT IN AN ORGANIZED HEALTH CARE EDUCATION/TRAINING PROGRAM

## 2023-11-30 PROCEDURE — 94664 DEMO&/EVAL PT USE INHALER: CPT

## 2023-11-30 PROCEDURE — 63710000001 PREDNISONE PER 1 MG: Performed by: INTERNAL MEDICINE

## 2023-11-30 RX ORDER — FERROUS SULFATE 325(65) MG
325 TABLET ORAL
Qty: 30 TABLET | Refills: 0 | Status: SHIPPED | OUTPATIENT
Start: 2023-11-30 | End: 2023-12-30

## 2023-11-30 RX ORDER — PREDNISONE 20 MG/1
40 TABLET ORAL
Status: DISCONTINUED | OUTPATIENT
Start: 2023-11-30 | End: 2023-11-30 | Stop reason: HOSPADM

## 2023-11-30 RX ORDER — AZITHROMYCIN 250 MG/1
500 TABLET, FILM COATED ORAL EVERY 24 HOURS
Status: DISCONTINUED | OUTPATIENT
Start: 2023-12-01 | End: 2023-11-30 | Stop reason: HOSPADM

## 2023-11-30 RX ORDER — NICOTINE 21 MG/24HR
1 PATCH, TRANSDERMAL 24 HOURS TRANSDERMAL
Qty: 14 PATCH | Refills: 0 | Status: SHIPPED | OUTPATIENT
Start: 2023-12-01 | End: 2023-12-15

## 2023-11-30 RX ORDER — PREDNISONE 20 MG/1
40 TABLET ORAL
Qty: 12 TABLET | Refills: 0 | Status: SHIPPED | OUTPATIENT
Start: 2023-12-01 | End: 2023-12-07

## 2023-11-30 RX ORDER — BUDESONIDE AND FORMOTEROL FUMARATE DIHYDRATE 160; 4.5 UG/1; UG/1
2 AEROSOL RESPIRATORY (INHALATION)
Status: DISCONTINUED | OUTPATIENT
Start: 2023-11-30 | End: 2023-11-30 | Stop reason: HOSPADM

## 2023-11-30 RX ORDER — TIOTROPIUM BROMIDE AND OLODATEROL 3.124; 2.736 UG/1; UG/1
2 SPRAY, METERED RESPIRATORY (INHALATION)
Qty: 4 G | Refills: 11 | Status: SHIPPED | OUTPATIENT
Start: 2023-11-30

## 2023-11-30 RX ORDER — LEVOFLOXACIN 750 MG/1
750 TABLET, FILM COATED ORAL EVERY 24 HOURS
Status: DISCONTINUED | OUTPATIENT
Start: 2023-11-30 | End: 2023-11-30

## 2023-11-30 RX ORDER — FAMOTIDINE 20 MG/1
20 TABLET, FILM COATED ORAL
Qty: 60 TABLET | Refills: 0 | Status: SHIPPED | OUTPATIENT
Start: 2023-11-30 | End: 2023-12-30

## 2023-11-30 RX ORDER — AZITHROMYCIN 500 MG/1
500 TABLET, FILM COATED ORAL DAILY
Qty: 3 TABLET | Refills: 0 | Status: SHIPPED | OUTPATIENT
Start: 2023-12-01 | End: 2023-12-04

## 2023-11-30 RX ADMIN — BUDESONIDE 0.5 MG: 0.5 SUSPENSION RESPIRATORY (INHALATION) at 06:27

## 2023-11-30 RX ADMIN — GABAPENTIN 400 MG: 400 CAPSULE ORAL at 16:47

## 2023-11-30 RX ADMIN — IPRATROPIUM BROMIDE AND ALBUTEROL SULFATE 3 ML: .5; 3 SOLUTION RESPIRATORY (INHALATION) at 06:27

## 2023-11-30 RX ADMIN — SODIUM CHLORIDE 250 MG: 9 INJECTION, SOLUTION INTRAVENOUS at 11:36

## 2023-11-30 RX ADMIN — FAMOTIDINE 20 MG: 20 TABLET ORAL at 16:47

## 2023-11-30 RX ADMIN — HEPARIN SODIUM 5000 UNITS: 5000 INJECTION INTRAVENOUS; SUBCUTANEOUS at 08:49

## 2023-11-30 RX ADMIN — NICOTINE 1 PATCH: 21 PATCH, EXTENDED RELEASE TRANSDERMAL at 08:49

## 2023-11-30 RX ADMIN — PREDNISONE 40 MG: 20 TABLET ORAL at 11:36

## 2023-11-30 RX ADMIN — METHYLPREDNISOLONE SODIUM SUCCINATE 40 MG: 40 INJECTION INTRAMUSCULAR; INTRAVENOUS at 06:01

## 2023-11-30 RX ADMIN — GABAPENTIN 400 MG: 400 CAPSULE ORAL at 08:49

## 2023-11-30 RX ADMIN — Medication 10 ML: at 08:50

## 2023-11-30 RX ADMIN — ARFORMOTEROL TARTRATE 15 MCG: 15 SOLUTION RESPIRATORY (INHALATION) at 06:27

## 2023-11-30 RX ADMIN — CLOPIDOGREL BISULFATE 75 MG: 75 TABLET ORAL at 08:49

## 2023-11-30 RX ADMIN — AZITHROMYCIN DIHYDRATE 250 MG: 250 TABLET ORAL at 08:49

## 2023-11-30 RX ADMIN — IPRATROPIUM BROMIDE AND ALBUTEROL SULFATE 3 ML: .5; 3 SOLUTION RESPIRATORY (INHALATION) at 12:12

## 2023-11-30 RX ADMIN — LEVOFLOXACIN 750 MG: 750 TABLET, FILM COATED ORAL at 11:36

## 2023-11-30 RX ADMIN — FAMOTIDINE 20 MG: 20 TABLET ORAL at 08:49

## 2023-11-30 RX ADMIN — DOCUSATE SODIUM 50MG AND SENNOSIDES 8.6MG 2 TABLET: 8.6; 5 TABLET, FILM COATED ORAL at 08:49

## 2023-11-30 RX ADMIN — VENLAFAXINE HYDROCHLORIDE 150 MG: 150 CAPSULE, EXTENDED RELEASE ORAL at 08:49

## 2023-11-30 NOTE — THERAPY EVALUATION
Acute Care - Physical Therapy Initial Evaluation   [place]     Patient Name: Gabriella Jha  : 1960  MRN: [id number]  Today's Date: 2023      Visit Dx:     ICD-10-CM ICD-9-CM   1. Hypoxia  R09.02 799.02   2. Nondisplaced fracture of fifth metatarsal bone, unspecified foot, initial encounter for closed fracture  S92.356A 825.25   3. Right foot pain  M79.671 729.5   4. Traumatic ecchymosis of right foot, initial encounter  S90.31XA 924.20   5. Right middle lobe pulmonary nodule  R91.1 793.11   6. Bronchiolitis  J21.9 466.19   7. Chronic obstructive pulmonary disease, unspecified COPD type  J44.9 496   8. Impaired mobility and ADLs  Z74.09 V49.89    Z78.9    9. Difficulty walking  R26.2 719.7     Patient Active Problem List   Diagnosis    Abdominal hernia    Anxiety disorder    Cerebral infarction    Chronic obstructive pulmonary disease    Chronic tension-type headache, intractable    Depressive disorder    Diastolic congestive heart failure    Diverticulitis    Gastroesophageal reflux disease    Headache    Hyperlipidemia    Hypertension    Indeterminate colitis    Major depressive disorder with single episode    Methicillin susceptible Staphylococcus aureus infection    Other hereditary and idiopathic neuropathies    Peripheral neuropathic pain    Postmenopausal state    Requests hormone replacement therapy    Special screening for malignant neoplasms, colon    Lung cancer    Malignant neoplasm of upper lobe, right bronchus or lung    Encounter for screening colonoscopy    Hypoxia     Past Medical History:   Diagnosis Date    Anxiety     CHF (congestive heart failure)     COPD (chronic obstructive pulmonary disease)     GERD (gastroesophageal reflux disease)     Hyperlipemia     Hypertension     Lung cancer     Migraines      Past Surgical History:   Procedure Laterality Date     SECTION      HYSTERECTOMY       PT Assessment (last 12 hours)       PT Evaluation and Treatment       Row Name  11/30/23 1300          Physical Therapy Time and Intention    Subjective Information no complaints  -CS     Document Type evaluation  -CS     Mode of Treatment individual therapy;physical therapy  -CS     Patient Effort fair  -CS     Symptoms Noted During/After Treatment fatigue  -CS       Row Name 11/30/23 1300          General Information    Patient Profile Reviewed yes  -CS     Patient Observations alert;cooperative  -CS     Prior Level of Function independent:;all household mobility;gait;transfer;ADL's;bed mobility  Sister lives behind her and can assist her with any needs. Typically independent with all functional mobility and ambulation not using an assistive device.  -CS     Equipment Currently Used at Home none  -CS     Existing Precautions/Restrictions fall;oxygen therapy device and L/min  2L NC  -CS     Barriers to Rehab none identified  -CS       Row Name 11/30/23 1300          Living Environment    Current Living Arrangements apartment  -CS     Home Accessibility stairs to enter home;stairs within home  -CS     People in Home alone  -CS     Primary Care Provided by self  -CS       Row Name 11/30/23 1300          Home Main Entrance    Number of Stairs, Main Entrance none  -CS       Row Name 11/30/23 1300          Pain    Pretreatment Pain Rating 0/10 - no pain  -CS     Posttreatment Pain Rating 0/10 - no pain  -CS       Row Name 11/30/23 1300          Cognition    Orientation Status (Cognition) oriented x 3  -CS       Row Name 11/30/23 1300          Range of Motion Comprehensive    General Range of Motion no range of motion deficits identified  -CS       Row Name 11/30/23 1300          Strength Comprehensive (MMT)    General Manual Muscle Testing (MMT) Assessment --  BLEs assessed at 4/5  -CS       Row Name 11/30/23 1300          Bed Mobility    Bed Mobility bed mobility (all) activities  -CS     All Activities, [place] (Bed Mobility) standby assist  -CS     Assistive Device (Bed Mobility) bed  rails;head of bed elevated  -CS       Row Name 11/30/23 1300          Transfers    Transfers sit-stand transfer  -CS       Row Name 11/30/23 1300          Sit-Stand Transfer    Sit-Stand [place] (Transfers) verbal cues;contact guard  -     Assistive Device (Sit-Stand Transfers) walker, front-wheeled  -       Row Name 11/30/23 1300          Gait/Stairs (Locomotion)    Comment, (Gait/Stairs) Pt refused to ambulate during evaluation but had ambulated with staff prior to me entering the room. She was able to ambulate to/from the bathroom 20' total with CGA and cam boot donned on R LE with rollign walker.  -       Row Name 11/30/23 1300          Safety Issues, Functional Mobility    Impairments Affecting Function (Mobility) balance;endurance/activity tolerance;strength;pain;shortness of breath  -       Row Name 11/30/23 1300          Balance    Balance Assessment sitting static balance  -CS     Static Sitting Balance supervision  -CS     Position, Sitting Balance supported  -       Row Name 11/30/23 1300          Plan of Care Review    Plan of Care Reviewed With patient  -CS     Progress no change  -     Outcome Evaluation Pt presents with limitations that impede their ability to safely and independently transfer and ambulate. The skills of a therapist will be required to safely and effectively implement the following treatment plan to restore maximal level of function.  -       Row Name 11/30/23 1300          Positioning and Restraints    Pre-Treatment Position in bed  -CS     Post Treatment Position bed  -CS     In Bed supine;call light within reach;encouraged to call for assist;exit alarm on  -CS       Row Name 11/30/23 1300          Therapy Assessment/Plan (PT)    Rehab Potential (PT) good, to achieve stated therapy goals  -CS     Criteria for Skilled Interventions Met (PT) yes;skilled treatment is necessary  -CS     Therapy Frequency (PT) daily  -CS     Predicted Duration of Therapy Intervention  (PT) 10 days  -CS     Problem List (PT) problems related to;balance;mobility;sensation;pain  -CS     Activity Limitations Related to Problem List (PT) unable to ambulate safely;unable to transfer safely  -CS       Row Name 11/30/23 1300          PT Evaluation Complexity    History, PT Evaluation Complexity no personal factors and/or comorbidities  -CS     Examination of Body Systems (PT Eval Complexity) total of 4 or more elements  -CS     Clinical Presentation (PT Evaluation Complexity) stable  -CS     Clinical Decision Making (PT Evaluation Complexity) low complexity  -CS     Overall Complexity (PT Evaluation Complexity) low complexity  -CS       Row Name 11/30/23 1300          Therapy Plan Review/Discharge Plan (PT)    Therapy Plan Review (PT) evaluation/treatment results reviewed;patient  -CS       Row Name 11/30/23 1300          Physical Therapy Goals    Bed Mobility Goal Selection (PT) bed mobility, PT goal 1  -CS     Transfer Goal Selection (PT) transfer, PT goal 1  -CS     Gait Training Goal Selection (PT) gait training, PT goal 1  -CS       Row Name 11/30/23 1300          Bed Mobility Goal 1 (PT)    Activity/Assistive Device (Bed Mobility Goal 1, PT) bed mobility activities, all  -CS     [place] Level/Cues Needed (Bed Mobility Goal 1, PT) independent  -CS     Time Frame (Bed Mobility Goal 1, PT) long term goal (LTG);10 days  -CS       Row Name 11/30/23 1300          Transfer Goal 1 (PT)    Activity/Assistive Device (Transfer Goal 1, PT) sit-to-stand/stand-to-sit;bed-to-chair/chair-to-bed  w/AAD  -CS     [place] Level/Cues Needed (Transfer Goal 1, PT) modified independence  -CS     Time Frame (Transfer Goal 1, PT) long term goal (LTG);10 days  -CS       Row Name 11/30/23 1300          Gait Training Goal 1 (PT)    Activity/Assistive Device (Gait Training Goal 1, PT) gait (walking locomotion);assistive device use  w/AAD  -CS     [place] Level (Gait Training Goal 1, PT) modified independence  -CS      Distance (Gait Training Goal 1, PT) 150  -CS     Time Frame (Gait Training Goal 1, PT) long term goal (LTG);10 days  -CS               User Key  (r) = Recorded By, (t) = Taken By, (c) = Cosigned By      Initials Name Provider Type    CS Kyara Valdes PT Physical Therapist                    Physical Therapy Education       Title: PT OT SLP Therapies (In Progress)       Topic: Physical Therapy (In Progress)       Point: Mobility training (Not Started)       Learner Progress:  Not documented in this visit.              Point: Home exercise program (Not Started)       Learner Progress:  Not documented in this visit.              Point: Body mechanics (Not Started)       Learner Progress:  Not documented in this visit.              Point: Precautions (In Progress)       Learning Progress Summary             Patient Acceptance, E, NR by CS at 11/30/2023 1312                                         User Key       Initials Effective Dates Name Provider Type Discipline     04/25/21 -  Kyara Valdes PT Physical Therapist PT                  PT Recommendation and Plan  Anticipated Discharge Disposition (PT): home with home health, home with assist  Planned Therapy Interventions (PT): balance training, bed mobility training, gait training, transfer training, strengthening  Therapy Frequency (PT): daily  Plan of Care Reviewed With: patient  Progress: no change  Outcome Evaluation: Pt presents with limitations that impede their ability to safely and independently transfer and ambulate. The skills of a therapist will be required to safely and effectively implement the following treatment plan to restore maximal level of function.   Outcome Measures       Row Name 11/30/23 1200             How much help from another person do you currently need...    Turning from your back to your side while in flat bed without using bedrails? 4  -CS      Moving from lying on back to sitting on the side of a flat bed without bedrails?  4  -CS      Moving to and from a bed to a chair (including a wheelchair)? 3  -CS      Standing up from a chair using your arms (e.g., wheelchair, bedside chair)? 3  -CS      Climbing 3-5 steps with a railing? 3  -CS      To walk in hospital room? 3  -CS      AM-PAC 6 Clicks Score (PT) 20  -CS      Highest Level of Mobility Goal 6 --> Walk 10 steps or more  -CS         Functional Assessment    Outcome Measure Options AM-PAC 6 Clicks Basic Mobility (PT)  -CS                User Key  (r) = Recorded By, (t) = Taken By, (c) = Cosigned By      Initials Name Provider Type    Kyara Melendrez PT Physical Therapist                     Time Calculation:    PT Charges       Row Name 11/30/23 1259             Time Calculation    PT Received On 11/30/23  -CS      PT Goal Re-Cert Due Date 12/09/23  -CS         Untimed Charges    PT Eval/Re-eval Minutes 25  -CS         Total Minutes    Untimed Charges Total Minutes 25  -CS       Total Minutes 25  -CS                User Key  (r) = Recorded By, (t) = Taken By, (c) = Cosigned By      Initials Name Provider Type    Kyara Melendrez PT Physical Therapist                  Therapy Charges for Today       Code Description Service Date Service Provider Modifiers Qty    [id number] HC PT EVAL LOW COMPLEXITY 2 11/30/2023 Kyara Valdes, PT GP 1            PT G-Codes  Outcome Measure Options: AM-PAC 6 Clicks Basic Mobility (PT)  AM-PAC 6 Clicks Score (PT): 20  AM-PAC 6 Clicks Score (OT): 21    Kyara Valdes PT  11/30/2023

## 2023-11-30 NOTE — PROCEDURES
Respiratory Therapist Walking Oximetry Progress Note      Patient Name:  Gabriella Jha  YOB: 1960  Date of Procedure: 11/30/23              ROOM AIR BASELINE   SpO2%- 82%   Heart Rate -91     EXERCISE ON ROOM AIR SpO2% EXERCISE ON O2 LPM SpO2%   1 MINUTE 82 1 MINUTE 2 87   2 MINUTES  2 MINUTES 2 85   3 MINUTES  3 MINUTES 3 87     4 MINUTES 3 88   5 MINUTES  5 MINUTES 3 89   6 MINUTES  6 MINUTES                SpO2% Post Exercise  92   HR Post Exercise  102   Time to Recovery       Comments:     Patient has a broken foot and states that she cannot walk very well in the boot she was given yesterday.  We did arm and leg exercises while she sat on the edge of her bed.  Her SpO2 decreased to 85% on 2 lpm during her exercising.  Patient SpO2 increased to 88% on 3 lpm while she performed her arm and leg movements/exercises and remained 88% or above.  Patient had no complaints of shortness of breath.  Returned patient oxygen to 2 lpm at rest, SpO2 92%.        Electronically signed by Carlyn Yuen RRT, 11/30/23, 3:37 PM EST.

## 2023-11-30 NOTE — PLAN OF CARE
Goal Outcome Evaluation:  Plan of Care Reviewed With: patient        Progress: no change  Outcome Evaluation: Pt presents with limitations that impede their ability to safely and independently transfer and ambulate. The skills of a therapist will be required to safely and effectively implement the following treatment plan to restore maximal level of function.      Anticipated Discharge Disposition (PT): home with home health, home with assist

## 2023-11-30 NOTE — PLAN OF CARE
Goal Outcome Evaluation:  Plan of Care Reviewed With: patient, daughter      Pt to discharge home today.

## 2023-11-30 NOTE — DISCHARGE SUMMARY
[facility]        HOSPITALIST  DISCHARGE SUMMARY    Patient Name: Gabriella Jha  : 1960  MRN: [id number]    Date of Admission: 2023  Date of Discharge:  2023  Primary Care Physician: Carolina Parra APRN    Consults       Date and Time Order Name Status Description    2023  8:14 PM IP General Consult (Use specialty-specific consult if known)              Active and Resolved Hospital Problems:  Active Hospital Problems    Diagnosis POA   • **Hypoxia [R09.02] Yes   • Legionella pneumonia [A48.1] Yes   • Iron deficiency anemia [D50.9] Yes      Resolved Hospital Problems   No resolved problems to display.   Hypoxia.  No recent home oxygen use  Acute COPD exacerbation  Legionella pneumonia  Right fifth metatarsal fracture  Fall at home causing above  Anemia iron deficiency.  S/p IV iron transfusion  Anxiety  Depression  History of CVA  History of lung cancer.  Bilateral hilar lymphadenopathy.  5 mm right lung nodule appears to be stable.  Tobacco abuse disorder.    Hospital Course     Hospital Course:  Gabriella Jha is a 63 y.o. female with a past medical history of smoker, COPD, hypertension, anxiety, depression, history of CVA and lung cancer presenting with hypoxia. Pt states that 3 days ago, she tripped while walking and fell to the floor. She presented to an urgent care today for right toe pain. While at the urgent care, her oxygen saturation was ranging from 50 to 70% on room air.  She was hooked up to 2 L of oxygen via nasal cannula in exam room with oxygen saturations ranging from 85 to 90% on the 2 L.  Upon O2 walk test on room air, her oxygen saturation was dipping to the upper 70s. She was transferred to our hospital for further management.  At this time, she denies any chest pain, SOB, N/V, abdominal pain or headaches     In ED, blood pressure 107/62, heart rate 93, respiratory rate of 28, temperature 96.8 and O2 saturation 70% on room air.  Labs on arrival  showed a sodium 138, thousand 3.9, BUN 19, creatinine 0.67, WBC 6.2, hemoglobin 9.8 and platelet count 193.  Chest x-ray showed no active pulmonary process.  Right foot x-ray showed nondisplaced fractures of the base of the right fifth metatarsal.  CT chest showed no evidence of pulmonary embolism.  Findings of bronchitis and bilateral hilar lymph node enlargements.  Interval Followup:   90% sats on 2 L.  No home oxygen use.  Patient was prescribed home oxygen long time ago but never used it.  Continues to have wheezing and cough.  Not able to bring up mucus.  No chest pain.  Right foot pain improving.  No more lethargic or unsteady.  Failed walk test in 1 minute.  Patient has broken right foot and does not walk well..    Patient wants to go home today.  Does not want to go to rehab    DISCHARGE Follow Up Recommendations for labs and diagnostics: Use of home oxygen at all times.  Follow with PCP and pulmonary.  Need spirometry as an outpatient to be set up by COPD clinic.  PCP to set up GI for anemia work-up.  Holding home losartan as blood pressure stable.  Finish antibiotics as directed.  Minimize weightbearing right foot and use right leg short walking boot.      Day of Discharge     Vital Signs:  Temp:  [98 °F (36.7 °C)-98.5 °F (36.9 °C)] 98.5 °F (36.9 °C)  Heart Rate:  [77-94] 92  Resp:  [16-20] 20  BP: (114-129)/(62-76) 117/64  Flow (L/min):  [2] 2    Physical Exam:     Constitutional: Awake, alert, no acute distress              Eyes: Pupils equal, sclerae anicteric, no conjunctival injection              HENT: NCAT, mucous membranes moist.  Edentulous              Neck: Supple, no thyromegaly, no lymphadenopathy, trachea midline              Respiratory: Wheezing  bilaterally, nonlabored respirations               Cardiovascular: RRR, no murmurs, rubs, or gallops, palpable pedal pulses bilaterally              Gastrointestinal: Positive bowel sounds, soft, nontender, nondistended               Musculoskeletal: Swollen, bruised and tender right lateral proximal foot area at fifth metatarsal.  No bilateral ankle edema, no clubbing or cyanosis to extremities              Psychiatric: Appropriate affect, cooperative              Neurologic: Oriented x 3, strength symmetric in all extremities, Cranial Nerves grossly intact to confrontation, speech clear              Skin: No rashes        Discharge Details        Discharge Medications        New Medications        Instructions Start Date   azithromycin 500 MG tablet  Commonly known as: Zithromax   500 mg, Oral, Daily   Start Date: December 1, 2023     famotidine 20 MG tablet  Commonly known as: PEPCID   20 mg, Oral, 2 Times Daily Before Meals      ferrous sulfate 325 (65 FE) MG tablet   325 mg, Oral, Daily With Breakfast      nicotine 21 MG/24HR patch  Commonly known as: NICODERM CQ   1 patch, Transdermal, Every 24 Hours Scheduled   Start Date: December 1, 2023     predniSONE 20 MG tablet  Commonly known as: DELTASONE   40 mg, Oral, Daily With Breakfast   Start Date: December 1, 2023     Stiolto Respimat 2.5-2.5 MCG/ACT aerosol solution inhaler  Generic drug: tiotropium bromide-olodaterol   2 puffs, Inhalation, Daily - RT             Continue These Medications        Instructions Start Date   albuterol sulfate  (90 Base) MCG/ACT inhaler  Commonly known as: PROVENTIL HFA;VENTOLIN HFA;PROAIR HFA   2 puffs, Inhalation, Every 4 Hours PRN      ARIPiprazole 30 MG tablet  Commonly known as: ABILIFY   30 mg, Oral, Nightly      clonazePAM 1 MG tablet  Commonly known as: KlonoPIN   1 mg, Oral, 2 Times Daily PRN      clopidogrel 75 MG tablet  Commonly known as: PLAVIX   75 mg, Oral, Daily      gabapentin 400 MG capsule  Commonly known as: NEURONTIN   400 mg, Oral, 3 Times Daily      rosuvastatin 10 MG tablet  Commonly known as: CRESTOR   10 mg, Oral, Every Night at Bedtime      venlafaxine  MG 24 hr capsule  Commonly known as: EFFEXOR-XR   150 mg, Oral,  Daily      Vitamin D3 1.25 MG (65423 UT) capsule   50,000 Units, Oral, Every 7 Days,               Stop These Medications      losartan 50 MG tablet  Commonly known as: COZAAR              Allergies   Allergen Reactions   • Clindamycin Hcl GI Intolerance       Discharge Disposition:  Home or Self Care.  In private vehicle with family member    Diet:  Diet Instructions       Diet: Regular/House Diet; Regular Texture (IDDSI 7); Thin (IDDSI 0)      Discharge Diet: Regular/House Diet    Texture: Regular Texture (IDDSI 7)    Fluid Consistency: Thin (IDDSI 0)            Discharge Activity:   Activity Instructions       Activity as Tolerated              CODE STATUS:  Code Status and Medical Interventions:   Ordered at: 11/28/23 2100     Level Of Support Discussed With:    Patient     Code Status (Patient has no pulse and is not breathing):    CPR (Attempt to Resuscitate)     Medical Interventions (Patient has pulse or is breathing):    Full Support         Future Appointments   Date Time Provider Department Center   12/7/2023 11:30 AM Lily Le APRN [facility]   1/8/2024  1:45 PM Sherif Kelsey DPM [facility]   5/6/2024 10:15 AM [address]   5/7/2024 10:00 AM Marcia Pardo APRN [facility]       Additional Instructions for the Follow-ups that You Need to Schedule       Discharge Follow-up with PCP   As directed       Currently Documented PCP:    Carolina Parra APRN    PCP Phone Number:    [phone]     Follow Up Details: 1 week                Pertinent  and/or Most Recent Results     PROCEDURES:   * Cannot find OR case *     LAB RESULTS:      Lab 11/30/23  0511 11/29/23  0611 11/28/23  2123 11/28/23  1645 11/28/23  1552   WBC 6.69 4.93  --  6.27  --    HEMOGLOBIN 9.4* 9.6*  --  9.8*  --    HEMATOCRIT 36.6 37.2  --  36.7  --    PLATELETS 175 178  --  193  --    NEUTROS ABS 6.00 4.53  --  4.90  --    IMMATURE GRANS (ABS) 0.07* 0.06*  --  0.02  --    LYMPHS ABS 0.31* 0.22*  --   0.57*  --    MONOS ABS 0.30 0.11  --  0.59  --    EOS ABS 0.00 0.00  --  0.14  --    MCV 81.5 81.0  --  80.1  --    PROCALCITONIN  --  0.10  --   --   --    LACTATE  --   --  0.8  --   --    LACTATE, ARTERIAL  --   --   --   --  1.20         Lab 11/30/23  0511 11/29/23  0611 11/28/23  1719 11/28/23  1552   SODIUM 138 138 138  --    SODIUM, VENOUS  --   --   --  137.4   POTASSIUM 4.7 4.2 3.9  --    POTASSIUM, VENOUS  --   --   --  4.6   CHLORIDE 99 100 100  --    CHLORIDE, VENOUS  --   --   --  101   CO2 34.3* 31.7* 30.6*  --    ANION GAP 4.7* 6.3 7.4  --    BUN 14 16 19  --    CREATININE 0.56* 0.63 0.67  --    EGFR 102.1 99.2 97.7  --    GLUCOSE 107* 165* 80  --    GLUCOSE, ARTERIAL  --   --   --  76   CALCIUM 8.7 8.3* 8.5*  --    IONIZED CALCIUM  --   --   --  1.06*         Lab 11/28/23  1719   TOTAL PROTEIN 6.3   ALBUMIN 3.3*   GLOBULIN 3.0   ALT (SGPT) 6   AST (SGOT) 14   BILIRUBIN 0.2   ALK PHOS 80                 Lab 11/30/23  0511   IRON 10*   IRON SATURATION (TSAT) 2*   TIBC 426   TRANSFERRIN 286         Lab 11/28/23  1552   FIO2 21   CARBOXYHEMOGLOBIN 16.2*     Brief Urine Lab Results  (Last result in the past 365 days)        Color   Clarity   Blood   Leuk Est   Nitrite   Protein   CREAT   Urine HCG        07/03/23 1311 Yellow   Clear   Trace   Moderate (2+)   Negative   Negative                 Microbiology Results (last 10 days)       Procedure Component Value - Date/Time    Legionella Antigen, Urine - Urine, Urine, Clean Catch [[id number]]  (Abnormal) Collected: 11/30/23 0246    Lab Status: Final result Specimen: Urine, Clean Catch Updated: 11/30/23 0707     LEGIONELLA ANTIGEN, URINE Positive    S. Pneumo Ag Urine or CSF - Urine, Urine, Clean Catch [[id number]]  (Normal) Collected: 11/30/23 0246    Lab Status: Final result Specimen: Urine, Clean Catch Updated: 11/30/23 0707     Strep Pneumo Ag Negative    MRSA Screen, PCR (Inpatient) - Swab, Nares [[id number]]  (Normal) Collected: 11/29/23 0101    Lab  Status: Final result Specimen: Swab from Nares Updated: 11/29/23 0248     MRSA PCR No MRSA Detected    Narrative:      The negative predictive value of this diagnostic test is high and should only be used to consider de-escalating anti-MRSA therapy. A positive result may indicate colonization with MRSA and must be correlated clinically.    Mycoplasma Pneumoniae Antibody, IgM - Blood, Arm, Left [[id number]]  (Normal) Collected: 11/28/23 2240    Lab Status: Final result Specimen: Blood from Arm, Left Updated: 11/28/23 2328     Mycoplasma pneumo IgM Negative    Blood Culture - Blood, Arm, Right [[id number]]  (Normal) Collected: 11/28/23 2140    Lab Status: Preliminary result Specimen: Blood from Arm, Right Updated: 11/29/23 2200     Blood Culture No growth at 24 hours    Blood Culture - Blood, Arm, Right [[id number]]  (Normal) Collected: 11/28/23 2123    Lab Status: Preliminary result Specimen: Blood from Arm, Right Updated: 11/29/23 2131     Blood Culture No growth at 24 hours    COVID PRE-OP / PRE-PROCEDURE SCREENING ORDER (NO ISOLATION) - Swab, Nasopharynx [[id number]]  (Normal) Collected: 11/28/23 1559    Lab Status: Final result Specimen: Swab from Nasopharynx Updated: 11/28/23 1643    Narrative:      The following orders were created for panel order COVID PRE-OP / PRE-PROCEDURE SCREENING ORDER (NO ISOLATION) - Swab, Nasopharynx.  Procedure                               Abnormality         Status                     ---------                               -----------         ------                     COVID-19, FLU A/B, RSV P...[[id number]]  Normal              Final result                 Please view results for these tests on the individual orders.    COVID-19, FLU A/B, RSV PCR 1 HR TAT - Swab, Nasopharynx [[id number]]  (Normal) Collected: 11/28/23 1559    Lab Status: Final result Specimen: Swab from Nasopharynx Updated: 11/28/23 1643     COVID19 Not Detected     Influenza A PCR Not Detected     Influenza B PCR Not  Detected     RSV, PCR Not Detected    Narrative:      Fact sheet for providers: https://www.fda.gov/media/459696/download    Fact sheet for patients: https://www.fda.gov/media/277993/download    Test performed by PCR.            CT Chest With Contrast Diagnostic    Result Date: 11/28/2023  Impression:    1. No evidence of pulmonary embolism  2. Findings of bronchitis, with bronchiolitis changes that are most pronounced in the right lower lobe  3. Pre-vascular and bilateral hilar lymph node enlargement which may be reactive or metastatic, recommend attention on follow-up  4. 5 mm nodule in the medial right middle lobe that is probably stable     EDY WOODY MD       Electronically Signed and Approved By: EDY WOODY MD on 11/28/2023 at 19:48             XR Chest 1 View    Result Date: 11/28/2023  Impression:   No active pulmonary process.  Chronic findings as above.       KIZZY TRUJILLO MD       Electronically Signed and Approved By: KIZZY TRUJILLO MD on 11/28/2023 at 16:10             XR Foot 3+ View Right    Result Date: 11/28/2023  Impression:  Nondisplaced fractures of the base of the right 5th metatarsal.      JUAN CABRERA MD       Electronically Signed and Approved By: JUAN CABRERA MD on 11/28/2023 at 16:09             CT Chest With Contrast Diagnostic    Result Date: 11/1/2023  Impression:   1. Stable 8 mm prevascular lymph node.  No new or enlarging mediastinal or hilar nodes. 2. Stable treatment related change in the left perihilar region.  3. No evidence of metastatic disease in chest.     TERE CISNEROS MD       Electronically Signed and Approved By: TERE CISNEROS MD on 11/01/2023 at 0:33                          Imaging Results (All)       Procedure Component Value Units Date/Time    CT Chest With Contrast Diagnostic [[id number]] Collected: 11/28/23 1948     Updated: 11/28/23 1951    Narrative:      PROCEDURE: CT CHEST W CONTRAST DIAGNOSTIC     COMPARISON:  None  INDICATIONS: LOW OXYGEN,  PT DENIES SHORTNESS OF BREATH. r/o PE     TECHNIQUE: After obtaining the patient's consent, CT images were obtained with non-ionic   intravenous contrast material.       PROTOCOL:   Pulmonary embolism imaging protocol performed      RADIATION:   DLP: 246.5 mGy*cm    Automated exposure control was utilized to minimize radiation dose.   CONTRAST: 59 cc Isovue 370 I.V.  LABS:   eGFR: 97.7 ml/min/1.73m2     FINDINGS:   Pulmonary arteries:  Adequately opacified.  No emboli demonstrated     Sonal/mediastinum:  Interval pre-vascular lymph node enlargement, now 1.1 cm, was 8 mm.  Interval   bilateral hilar lymph node enlargement.  No pericardial effusion.  Coronary artery calcification     Lungs/pleura:  Stable left perihilar fibrosis with scarring radiating into the left upper lobe.    Bilateral bronchial wall thickening.  Prominent tree-in-bud opacities in the right lower lobe, with   scattered tree-in-bud opacities elsewhere in the lungs.  Strandy airspace disease in the lateral   right lung base that likely represents atelectasis.  5 mm nodule in the medial right middle lobe.    No pleural effusion     Upper abdomen:  No acute abnormality.  Cysts in the liver     Bones/soft tissues:  No acute bony abnormality.       Impression:            1. No evidence of pulmonary embolism     2. Findings of bronchitis, with bronchiolitis changes that are most pronounced in the right lower   lobe     3. Pre-vascular and bilateral hilar lymph node enlargement which may be reactive or metastatic,   recommend attention on follow-up     4. 5 mm nodule in the medial right middle lobe that is probably stable             EDY WOODY MD         Electronically Signed and Approved By: EDY WOODY MD on 11/28/2023 at 19:48                     XR Chest 1 View [[id number]] Collected: 11/28/23 1610     Updated: 11/28/23 1613    Narrative:      PROCEDURE: XR CHEST 1 VW     COMPARISON: [facility], CT, CT CHEST W CONTRAST DIAGNOSTIC,  10/31/2023, 16:09.     INDICATIONS: SHORTNESS OF BREATH TODAY     FINDINGS:   Densely calcified right lower lobe granuloma.  Linear scarring/atelectasis in the left upper lobe   likely related to post treatment related changes.  There is underlying emphysema.  Negative for   lobar infiltrate, large effusion, edema or pneumothorax.  Heart size within normal limits.    Degenerative changes within thoracic spine with mild dextrocurvature.  Aortic atherosclerotic   disease.       Impression:         No active pulmonary process.  Chronic findings as above.                  KIZZY TRUJILLO MD         Electronically Signed and Approved By: KIZZY TRUJILLO MD on 11/28/2023 at 16:10                     XR Foot 3+ View Right [[id number]] Collected: 11/28/23 1609     Updated: 11/28/23 1612    Narrative:      PROCEDURE: XR FOOT 3+ VW RIGHT     COMPARISON: [facility], , FOOT >OR= 3V RT, 6/17/2019, 18:14.     INDICATIONS: GENERALIZED RIGHT FOOT PAIN AFTER FALL TODAY     FINDINGS:   BONES: There is a nondisplaced fracture at the base of the right 5th metatarsal.  There is a   plantar calcaneal enthesophyte.  SOFT TISSUES: Mild soft tissue swelling is present.  EFFUSION: None visible.    OTHER: Negative.         Impression:       Nondisplaced fractures of the base of the right 5th metatarsal.               JUAN CABRERA MD         Electronically Signed and Approved By: JUAN CABRERA MD on 11/28/2023 at 16:09                              Labs Pending at Discharge:  Pending Labs       Order Current Status    Blood Culture - Blood, Arm, Right Preliminary result    Blood Culture - Blood, Arm, Right Preliminary result                Time spent on Discharge including face to face service: 35 minutes  Part of this note may be an electronic transcription/translation of spoken language to printed text using the Dragon Dictation System.     TElectronically signed by Brandon Fenton MD, 11/30/23, 4:04 PM EST.

## 2023-11-30 NOTE — CONSULTS
Pulmonary Rehab Phase I - Occurrence #1    Education Topics:  Pulmonary Rehab yes     Topic Components:  Exercise yes     Teaching Aids:  Phase 2 Brochure yes     Teaching Method:  Written Instruction yes     Teaching Recipient:  Patient yes       Recovery/Discharge Instructions:  Pulmonary Rehab Phase 2 yes       Patient Qualification:  Pulmonary Rehab Phase 2 yes

## 2023-11-30 NOTE — PLAN OF CARE
Goal Outcome Evaluation:  Plan of Care Reviewed With: patient        Progress: no change  Outcome Evaluation: VSS, stable with 2L O2. Pt has been lethargic, and has slept through most of the shift. Pt will wake when spoken to and answer questions.

## 2023-12-01 NOTE — OUTREACH NOTE
Prep Survey      Flowsheet Row Responses   [employer] facility patient discharged from? [facility]   Is LACE score < 7 ? No   Eligibility Readm Mgmt   Discharge diagnosis *Hypoxia (    Legionella pneumonia   Does the patient have one of the following disease processes/diagnoses(primary or secondary)? Pneumonia   Does the patient have Home health ordered? No   Is there a DME ordered? No   Prep survey completed? Yes            CECILE VICTOR - Registered Nurse

## 2023-12-03 LAB
BACTERIA SPEC AEROBE CULT: NORMAL
BACTERIA SPEC AEROBE CULT: NORMAL

## 2023-12-04 ENCOUNTER — READMISSION MANAGEMENT (OUTPATIENT)
Dept: CALL CENTER | Facility: HOSPITAL | Age: 63
End: 2023-12-04
Payer: COMMERCIAL

## 2023-12-04 NOTE — OUTREACH NOTE
COPD/PN Week 1 Survey      Flowsheet Row Responses   [employer] facility patient discharged from? [facility]   Does the patient have one of the following disease processes/diagnoses(primary or secondary)? Pneumonia   Week 1 attempt successful? No   Unsuccessful attempts Attempt 1            Joan Castro Licensed Nurse

## 2023-12-07 ENCOUNTER — READMISSION MANAGEMENT (OUTPATIENT)
Dept: CALL CENTER | Facility: HOSPITAL | Age: 63
End: 2023-12-07
Payer: COMMERCIAL

## 2023-12-07 NOTE — OUTREACH NOTE
COPD/PN Week 1 Survey      Flowsheet Row Responses   [facility] facility patient discharged from? [facility]   Does the patient have one of the following disease processes/diagnoses(primary or secondary)? Pneumonia   Week 1 attempt successful? No   Unsuccessful attempts Attempt 2            Ashley CAO - Registered Nurse  
153.03

## 2023-12-13 ENCOUNTER — READMISSION MANAGEMENT (OUTPATIENT)
Dept: CALL CENTER | Facility: HOSPITAL | Age: 63
End: 2023-12-13
Payer: COMMERCIAL

## 2024-04-17 ENCOUNTER — HOSPITAL ENCOUNTER (EMERGENCY)
Facility: HOSPITAL | Age: 64
Discharge: HOME | End: 2024-04-17
Attending: EMERGENCY MEDICINE
Payer: COMMERCIAL

## 2024-04-17 ENCOUNTER — APPOINTMENT (OUTPATIENT)
Dept: GENERAL RADIOLOGY | Facility: HOSPITAL | Age: 64
End: 2024-04-17
Payer: COMMERCIAL

## 2024-04-17 VITALS
HEIGHT: 61 IN | BODY MASS INDEX: 22.52 KG/M2 | HEART RATE: 71 BPM | RESPIRATION RATE: 20 BRPM | WEIGHT: 119.27 LBS | TEMPERATURE: 97.2 F | SYSTOLIC BLOOD PRESSURE: 101 MMHG | DIASTOLIC BLOOD PRESSURE: 69 MMHG | OXYGEN SATURATION: 93 %
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
## 2024-04-17 PROCEDURE — 99283 EMERGENCY DEPT VISIT LOW MDM: CPT

## 2024-04-17 PROCEDURE — 72220 X-RAY EXAM SACRUM TAILBONE: CPT

## 2024-04-17 NOTE — ED PROVIDER NOTES
Time: 3:52 PM EDT  Date of encounter:  2024  Independent Historian/Clinical History and Information was obtained by:   Patient    History is limited by: N/A    Chief Complaint   Patient presents with    Fall         History of Present Illness:  Patient is a 63 y.o. year old female who presents to the emergency department for evaluation after a fall.  Patient states that she was at [employer] and was trying to sit down to look at something of the lower shelf.  States that her legs gave up and she fell on her bottom.  Denies any head trauma or LOC.  Denies any areas of pain. (Triage Provider: Saulo Luis PA-C).      Patient Care Team  Primary Care Provider: Carolina Parra APRN    Past Medical History:     Allergies   Allergen Reactions    Clindamycin Hcl Rash     Past Medical History:   Diagnosis Date    Anxiety     CHF (congestive heart failure)     COPD (chronic obstructive pulmonary disease)     GERD (gastroesophageal reflux disease)     Hyperlipemia     Hypertension     Lung cancer     Migraines      Past Surgical History:   Procedure Laterality Date     SECTION      HYSTERECTOMY       History reviewed. No pertinent family history.    Home Medications:  Prior to Admission medications    Medication Sig Start Date End Date Taking? Authorizing Provider   albuterol sulfate  (90 Base) MCG/ACT inhaler Inhale 2 puffs Every 4 (Four) Hours As Needed for Wheezing or Shortness of Air. 23   Marcia Pardo APRN   ARIPiprazole (ABILIFY) 30 MG tablet Take 1 tablet by mouth Every Night. 9/10/21   Estefany Washington MD   Cholecalciferol (Vitamin D3) 1.25 MG (66109 UT) capsule Take 1 capsule by mouth Every 7 (Seven) Days.   22   Estefany Washington MD   clonazePAM (KlonoPIN) 1 MG tablet Take 1 tablet by mouth 2 (Two) Times a Day As Needed.    Estefany Washington MD   clopidogrel (PLAVIX) 75 MG tablet Take 1 tablet by mouth Daily. 21   Estefany Washington MD   gabapentin (NEURONTIN) 400 MG  "capsule Take 1 capsule by mouth 3 (Three) Times a Day. 11/17/21   Estefany Washington MD   rosuvastatin (CRESTOR) 10 MG tablet Take 1 tablet by mouth every night at bedtime. 6/23/22   Estefany Washington MD   tiotropium bromide-olodaterol (Stiolto Respimat) 2.5-2.5 MCG/ACT aerosol solution inhaler Inhale 2 puffs Daily. 11/30/23   Brandon Fenton MD   venlafaxine XR (EFFEXOR-XR) 150 MG 24 hr capsule Take 1 capsule by mouth Daily. 9/3/21   Estefany Washington MD        Social History:   Social History     Tobacco Use    Smoking status: Every Day     Current packs/day: 0.25     Average packs/day: 0.3 packs/day for 49.4 years (12.4 ttl pk-yrs)     Types: Cigarettes     Start date: 11/18/1974    Smokeless tobacco: Never   Vaping Use    Vaping status: Never Used   Substance Use Topics    Alcohol use: Not Currently    Drug use: Never         Review of Systems:  Review of Systems   Constitutional:  Negative for chills, fatigue and fever.   HENT:  Negative for ear pain, rhinorrhea and sore throat.    Eyes:  Negative for visual disturbance.   Respiratory:  Negative for cough and shortness of breath.    Cardiovascular:  Negative for chest pain.   Gastrointestinal:  Negative for abdominal pain, diarrhea and vomiting.   Genitourinary:  Negative for difficulty urinating.   Musculoskeletal:  Positive for arthralgias and gait problem. Negative for back pain and myalgias.   Skin:  Negative for rash.   Neurological:  Positive for weakness (Patient reports she was weak and unable to walk after the fall occurred.  Patient is ambulatory at this time.). Negative for light-headedness and headaches.   Hematological:  Negative for adenopathy.   Psychiatric/Behavioral: Negative.          Physical Exam:  /69 (BP Location: Right arm, Patient Position: Sitting)   Pulse 71   Temp 97.2 °F (36.2 °C) (Oral)   Resp 20   Ht 154.9 cm (61\")   Wt 54.1 kg (119 lb 4.3 oz)   SpO2 93%   BMI 22.54 kg/m²         Physical Exam  Vitals and " nursing note reviewed.   Constitutional:       General: She is not in acute distress.     Appearance: Normal appearance. She is not toxic-appearing.   HENT:      Head: Normocephalic and atraumatic.      Nose: Nose normal.      Mouth/Throat:      Mouth: Mucous membranes are moist.   Eyes:      Conjunctiva/sclera: Conjunctivae normal.   Cardiovascular:      Rate and Rhythm: Normal rate.      Pulses: Normal pulses.   Pulmonary:      Effort: Pulmonary effort is normal.   Abdominal:      Palpations: Abdomen is soft.   Musculoskeletal:         General: No tenderness. Normal range of motion.      Cervical back: Normal range of motion.   Skin:     General: Skin is warm and dry.   Neurological:      General: No focal deficit present.      Mental Status: She is alert and oriented to person, place, and time.      Cranial Nerves: No cranial nerve deficit.      Motor: No weakness.   Psychiatric:         Mood and Affect: Mood normal.         Behavior: Behavior normal.         Thought Content: Thought content normal.         Judgment: Judgment normal.                      Procedures:  Procedures      Medical Decision Making:      Comorbidities that affect care:    COPD, Hypertension    External Notes reviewed:    None      The following orders were placed and all results were independently analyzed by me:  Orders Placed This Encounter   Procedures    XR Sacrum & Coccyx       Medications Given in the Emergency Department:  Medications - No data to display     ED Course:    The patient was initially evaluated in the triage area where orders were placed. The patient was later dispositioned by AMADOR Espinal.      The patient was advised to stay for completion of workup which includes but is not limited to communication of labs and radiological results, reassessment and plan. The patient was advised that leaving prior to disposition by a provider could result in critical findings that are not communicated to the patient.     ED  Course as of 04/17/24 1814 Wed Apr 17, 2024   0123 PROVIDER IN TRIAGE  Patient was evaluated by Saulo yoon PA-C. Orders were placed and awaiting final results and disposition.   [MV]      ED Course User Index  [MV] Saulo Luis PA       Labs:    Lab Results (last 24 hours)       ** No results found for the last 24 hours. **             Imaging:    XR Sacrum & Coccyx    Result Date: 4/17/2024  XR SACRUM AND COCCYX-  Date of Exam: 4/17/2024 5:03 PM  Indication: Fall  Comparison: None available.  Findings: Bowel limits evaluation of the sacrum and pubic symphysis. Osteopenia. There is exclusion of the lateral aspect of the proximal left femur from field-of-view. There is moderate joint space narrowing of both hips. Sacroiliac joints are normal in appearance. Large stool burden. No displaced pelvic or sacrococcygeal fractures.      Impression: Limited evaluation as described in findings. Osteopenia. Moderate arthritis of the hips. No acute pelvic fractures on radiograph. No displaced sacrococcygeal fracture identified.   Electronically Signed By-Yara Prater MD On:4/17/2024 5:29 PM         Differential Diagnosis and Discussion:      Extremity Pain: Differential diagnosis includes but is not limited to soft tissue sprain, tendonitis, tendon injury, dislocation, fracture, deep vein thrombosis, arterial insufficiency, osteoarthritis, bursitis, and ligamentous damage.    All X-rays impressions were independently interpreted by me.    MDM     Amount and/or Complexity of Data Reviewed  Tests in the radiology section of CPT®: reviewed           Patient Care Considerations:    NARCOTICS: I considered prescribing opiate pain medication as an outpatient, however patient complaining of no pain at the time of exam.      Consultants/Shared Management Plan:    None    Social Determinants of Health:    Patient is independent, reliable, and has access to care.       Disposition and Care Coordination:    Discharged: The patient is  suitable and stable for discharge with no need for consideration of admission.    I have explained the patient´s condition, diagnoses and treatment plan based on the information available to me at this time. I have answered questions and addressed any concerns. The patient has a good  understanding of the patient´s diagnosis, condition, and treatment plan as can be expected at this point. The vital signs have been stable. The patient´s condition is stable and appropriate for discharge from the emergency department.      The patient will pursue further outpatient evaluation with the primary care physician or other designated or consulting physician as outlined in the discharge instructions. They are agreeable to this plan of care and follow-up instructions have been explained in detail. The patient has received these instructions in written format and has expressed an understanding of the discharge instructions. The patient is aware that any significant change in condition or worsening of symptoms should prompt an immediate return to this or the closest emergency department or call to 911.  I have explained discharge medications and the need for follow up with the patient/caretakers. This was also printed in the discharge instructions. Patient was discharged with the following medications and follow up:      Medication List      No changes were made to your prescriptions during this visit.      Carolina Parra, APRN  [address] [place] [address]  [phone]    Go to   As needed       Final diagnoses:   Fall, initial encounter        ED Disposition       ED Disposition   Discharge    Condition   Stable    Comment   --               This medical record created using voice recognition software.             Dayna Horton, APRN  04/17/24 [id number]

## 2024-04-17 NOTE — DISCHARGE INSTRUCTIONS
Please follow-up with primary care provider as needed.  Return to the ED if you have increasing pain or increasing weakness.

## 2024-05-06 ENCOUNTER — HOSPITAL ENCOUNTER (OUTPATIENT)
Dept: CT IMAGING | Facility: HOSPITAL | Age: 64
Discharge: HOME | End: 2024-05-06
Admitting: NURSE PRACTITIONER
Payer: COMMERCIAL

## 2024-05-06 DIAGNOSIS — C34.11 MALIGNANT NEOPLASM OF UPPER LOBE, RIGHT BRONCHUS OR LUNG: ICD-10-CM

## 2024-05-06 LAB
CREAT BLDA-MCNC: 1 MG/DL (ref 0.6–1.3)
EGFRCR SERPLBLD CKD-EPI 2021: 63 ML/MIN/1.73

## 2024-05-06 PROCEDURE — 25510000001 IOPAMIDOL PER 1 ML: Performed by: NURSE PRACTITIONER

## 2024-05-06 PROCEDURE — 82565 ASSAY OF CREATININE: CPT

## 2024-05-06 PROCEDURE — 71260 CT THORAX DX C+: CPT

## 2024-05-06 RX ADMIN — IOPAMIDOL 100 ML: 755 INJECTION, SOLUTION INTRAVENOUS at 10:59

## 2024-05-07 ENCOUNTER — OFFICE VISIT (OUTPATIENT)
Dept: RADIATION ONCOLOGY | Facility: HOSPITAL | Age: 64
End: 2024-05-07
Payer: COMMERCIAL

## 2024-05-07 VITALS
SYSTOLIC BLOOD PRESSURE: 117 MMHG | RESPIRATION RATE: 16 BRPM | WEIGHT: 129.63 LBS | BODY MASS INDEX: 24.49 KG/M2 | TEMPERATURE: 98.1 F | OXYGEN SATURATION: 96 % | HEART RATE: 67 BPM | DIASTOLIC BLOOD PRESSURE: 76 MMHG

## 2024-05-07 DIAGNOSIS — Z08 ENCOUNTER FOR FOLLOW-UP SURVEILLANCE OF LUNG CANCER: ICD-10-CM

## 2024-05-07 DIAGNOSIS — Z85.118 ENCOUNTER FOR FOLLOW-UP SURVEILLANCE OF LUNG CANCER: ICD-10-CM

## 2024-05-07 DIAGNOSIS — F17.210 NICOTINE DEPENDENCE, CIGARETTES, UNCOMPLICATED: ICD-10-CM

## 2024-05-07 DIAGNOSIS — J44.9 CHRONIC OBSTRUCTIVE PULMONARY DISEASE, UNSPECIFIED COPD TYPE: ICD-10-CM

## 2024-05-07 DIAGNOSIS — C34.11 MALIGNANT NEOPLASM OF UPPER LOBE, RIGHT BRONCHUS OR LUNG: Primary | ICD-10-CM

## 2024-05-07 DIAGNOSIS — Z92.3 PERSONAL HISTORY OF RADIATION THERAPY: ICD-10-CM

## 2024-05-07 DIAGNOSIS — R93.89 ABNORMAL CT OF THE CHEST: ICD-10-CM

## 2024-05-07 DIAGNOSIS — C34.12 MALIGNANT NEOPLASM OF UPPER LOBE OF LEFT LUNG: ICD-10-CM

## 2024-05-07 PROCEDURE — G0463 HOSPITAL OUTPT CLINIC VISIT: HCPCS | Performed by: NURSE PRACTITIONER

## 2024-05-07 RX ORDER — GUAIFENESIN 600 MG/1
1200 TABLET, EXTENDED RELEASE ORAL 2 TIMES DAILY
Qty: 60 TABLET | Refills: 2 | Status: SHIPPED | OUTPATIENT
Start: 2024-05-07

## 2024-06-19 ENCOUNTER — HOSPITAL ENCOUNTER (OUTPATIENT)
Dept: CT IMAGING | Facility: HOSPITAL | Age: 64
Discharge: HOME | End: 2024-06-19
Admitting: NURSE PRACTITIONER
Payer: COMMERCIAL

## 2024-06-19 DIAGNOSIS — C34.11 MALIGNANT NEOPLASM OF UPPER LOBE, RIGHT BRONCHUS OR LUNG: ICD-10-CM

## 2024-06-19 DIAGNOSIS — C34.12 MALIGNANT NEOPLASM OF UPPER LOBE OF LEFT LUNG: ICD-10-CM

## 2024-06-19 DIAGNOSIS — R93.89 ABNORMAL CT OF THE CHEST: ICD-10-CM

## 2024-06-19 LAB
CREAT BLDA-MCNC: 1.1 MG/DL (ref 0.6–1.3)
EGFRCR SERPLBLD CKD-EPI 2021: 56.2 ML/MIN/1.73

## 2024-06-19 PROCEDURE — 82565 ASSAY OF CREATININE: CPT

## 2024-06-19 PROCEDURE — 25510000001 IOPAMIDOL PER 1 ML: Performed by: NURSE PRACTITIONER

## 2024-06-19 PROCEDURE — 71260 CT THORAX DX C+: CPT

## 2024-06-19 RX ADMIN — IOPAMIDOL 75 ML: 755 INJECTION, SOLUTION INTRAVENOUS at 16:12

## 2024-06-21 ENCOUNTER — OFFICE VISIT (OUTPATIENT)
Dept: RADIATION ONCOLOGY | Facility: HOSPITAL | Age: 64
End: 2024-06-21
Payer: COMMERCIAL

## 2024-06-21 VITALS
HEART RATE: 68 BPM | SYSTOLIC BLOOD PRESSURE: 124 MMHG | BODY MASS INDEX: 24.7 KG/M2 | DIASTOLIC BLOOD PRESSURE: 69 MMHG | TEMPERATURE: 96.2 F | OXYGEN SATURATION: 95 % | WEIGHT: 130.73 LBS | RESPIRATION RATE: 18 BRPM

## 2024-06-21 DIAGNOSIS — R93.89 ABNORMAL CT OF THE CHEST: ICD-10-CM

## 2024-06-21 DIAGNOSIS — J44.9 CHRONIC OBSTRUCTIVE PULMONARY DISEASE, UNSPECIFIED COPD TYPE: ICD-10-CM

## 2024-06-21 DIAGNOSIS — C34.12 MALIGNANT NEOPLASM OF UPPER LOBE OF LEFT LUNG: Primary | ICD-10-CM

## 2024-06-21 DIAGNOSIS — Z85.118 ENCOUNTER FOR FOLLOW-UP SURVEILLANCE OF LUNG CANCER: ICD-10-CM

## 2024-06-21 DIAGNOSIS — Z92.3 PERSONAL HISTORY OF RADIATION THERAPY: ICD-10-CM

## 2024-06-21 DIAGNOSIS — F17.210 NICOTINE DEPENDENCE, CIGARETTES, UNCOMPLICATED: ICD-10-CM

## 2024-06-21 DIAGNOSIS — Z08 ENCOUNTER FOR FOLLOW-UP SURVEILLANCE OF LUNG CANCER: ICD-10-CM

## 2024-06-21 DIAGNOSIS — R91.1 PULMONARY NODULE: ICD-10-CM

## 2024-06-21 DIAGNOSIS — C34.11 MALIGNANT NEOPLASM OF UPPER LOBE, RIGHT BRONCHUS OR LUNG: ICD-10-CM

## 2024-06-21 PROCEDURE — G0463 HOSPITAL OUTPT CLINIC VISIT: HCPCS | Performed by: NURSE PRACTITIONER

## 2024-06-21 NOTE — PROGRESS NOTES
Follow Up Office Visit      Encounter Date: 06/21/2024   Patient Name: Gabriella Jha  YOB: 1960   Medical Record Number: [id number]   Primary Diagnosis: Malignant neoplasm of upper lobe of left lung [C34.12]     Radiation Completion Date:     10/19/2022 RUL SBRT   7/1/2020 ETELVINA SBRT    Chief Complaint:    Chief Complaint   Patient presents with    Follow-up    Lung Cancer       Oncology/Hematology History   Malignant neoplasm of upper lobe, right bronchus or lung   9/30/2022 Initial Diagnosis    Malignant neoplasm of upper lobe, right bronchus or lung (HCC)     10/10/2022 - 10/19/2022 Radiation    Radiation OncologyTreatment Course:  Gabriella Jha received 5500 cGy in 5 fractions to right upper lobe via SBRT.         History of Present Illness: Gabriella Jha is a 63 y.o. female who returns to [facility] for short interval 6 week follow-up of her lung cancer. She reports feeling well overall with no new complaints or concerns. She denies any significant changes in her breathing since prior visit. She reports mild shortness of breath with exertion that does not hinder her ADLs. She has occasional cough productive of clear sputum. She denies headaches, hemoptysis, chest tightness, confusion, focal weakness or weight loss.     Subjective      Review of Systems: Review of Systems   Constitutional:  Positive for fatigue (3/10). Negative for appetite change.   HENT:  Negative for hearing loss, sore throat, tinnitus and trouble swallowing.    Eyes:  Negative for visual disturbance.   Respiratory:  Positive for cough (dry) and wheezing (Occasionally, ongoing). Negative for chest tightness and shortness of breath.    Cardiovascular:  Negative for chest pain, palpitations and leg swelling.   Gastrointestinal:  Positive for abdominal distention (x couple of weeks.). Negative for abdominal pain, anal bleeding, blood in stool, constipation, diarrhea, nausea and vomiting.   Genitourinary:   Negative for difficulty urinating, dysuria, frequency, hematuria and urgency.   Musculoskeletal:  Negative for arthralgias, back pain, gait problem and joint swelling.   Skin:  Negative for color change and rash.   Neurological:  Positive for dizziness (Noted with postural changes, ongoing). Negative for weakness and headaches.   Psychiatric/Behavioral:  Negative for sleep disturbance.        The following portions of the patient's history were reviewed and updated as appropriate: allergies, current medications, past family history, past medical history, past social history, past surgical history and problem list.    Medications:     Current Outpatient Medications:     albuterol sulfate  (90 Base) MCG/ACT inhaler, Inhale 2 puffs Every 4 (Four) Hours As Needed for Wheezing or Shortness of Air., Disp: 18 g, Rfl: 4    ARIPiprazole (ABILIFY) 30 MG tablet, Take 1 tablet by mouth Every Night., Disp: , Rfl:     buPROPion HCl (WELLBUTRIN PO), Take 1 tablet by mouth Daily., Disp: , Rfl:     Cholecalciferol (Vitamin D3) 1.25 MG (29662 UT) capsule, Take 1 capsule by mouth Every 7 (Seven) Days.  , Disp: , Rfl:     clonazePAM (KlonoPIN) 1 MG tablet, Take 1 tablet by mouth 2 (Two) Times a Day As Needed., Disp: , Rfl:     clopidogrel (PLAVIX) 75 MG tablet, Take 1 tablet by mouth Daily., Disp: , Rfl:     Ferrous Sulfate (IRON PO), Take 1 tablet by mouth Daily., Disp: , Rfl:     gabapentin (NEURONTIN) 400 MG capsule, Take 1 capsule by mouth 3 (Three) Times a Day., Disp: , Rfl:     rosuvastatin (CRESTOR) 10 MG tablet, Take 1 tablet by mouth every night at bedtime., Disp: , Rfl:     tiotropium bromide-olodaterol (Stiolto Respimat) 2.5-2.5 MCG/ACT aerosol solution inhaler, Inhale 2 puffs Daily., Disp: 4 g, Rfl: 11    venlafaxine XR (EFFEXOR-XR) 150 MG 24 hr capsule, Take 1 capsule by mouth Daily., Disp: , Rfl:     guaiFENesin (Mucinex) 600 MG 12 hr tablet, Take 2 tablets by mouth 2 (Two) Times a Day. (Patient not taking:  Reported on 6/21/2024), Disp: 60 tablet, Rfl: 2    Allergies:   Allergies   Allergen Reactions    Clindamycin Hcl Rash       Patient Smoking History:   Social History     Tobacco Use   Smoking Status Every Day    Current packs/day: 0.25    Average packs/day: 0.3 packs/day for 49.6 years (12.4 ttl pk-yrs)    Types: Cigarettes    Start date: 11/18/1974   Smokeless Tobacco Never       Measures:  PHQ-9 Total Score: 0   Quality of Life: 100 - Full Activity   ECOG score: 0  ECOG: (0) Fully active, able to carry on all predisease performance without restriction  Pain: (on a scale of 0-10)   Pain Score    06/21/24 1444   PainSc: 0-No pain     Objective     Physical Exam:   Vital Signs:   Vitals:    06/21/24 1444   BP: 124/69   Pulse: 68   Resp: 18   Temp: 96.2 °F (35.7 °C)   TempSrc: Temporal   SpO2: 95%   Weight: 59.3 kg (130 lb 11.7 oz)   PainSc: 0-No pain     Body mass index is 24.7 kg/m².   Wt Readings from Last 3 Encounters:   06/21/24 59.3 kg (130 lb 11.7 oz)   05/07/24 58.8 kg (129 lb 10.1 oz)   04/17/24 54.1 kg (119 lb 4.3 oz)       Physical Exam  Vitals reviewed.   Constitutional:       General: She is not in acute distress.     Appearance: Normal appearance. She is normal weight. She is not ill-appearing.   HENT:      Head: Normocephalic and atraumatic.      Mouth/Throat:      Mouth: Mucous membranes are moist.      Pharynx: Oropharynx is clear. No oropharyngeal exudate or posterior oropharyngeal erythema.   Eyes:      Conjunctiva/sclera: Conjunctivae normal.      Pupils: Pupils are equal, round, and reactive to light.   Cardiovascular:      Rate and Rhythm: Normal rate and regular rhythm.      Pulses: Normal pulses.      Heart sounds: Normal heart sounds.   Pulmonary:      Effort: Pulmonary effort is normal. No respiratory distress.      Breath sounds: No wheezing, rhonchi or rales.      Comments: Decreased throughout, no adventitious lung sounds heard  Abdominal:      General: Bowel sounds are normal.       "Palpations: Abdomen is soft.   Musculoskeletal:         General: Normal range of motion.      Cervical back: Normal range of motion.   Skin:     General: Skin is warm and dry.   Neurological:      General: No focal deficit present.      Mental Status: She is alert and oriented to person, place, and time. Mental status is at baseline.   Psychiatric:         Mood and Affect: Mood normal.         Behavior: Behavior normal.       Result Review: I independently reviewed the following data.  -- CT Chest With Contrast Diagnostic (06/19/2024 16:12)   -- CT Chest With Contrast Diagnostic (05/06/2024 10:59)   -- CT Chest With Contrast Diagnostic (11/28/2023 19:30)     Pathology: No results found for: \"CLININFO\", \"FINALDX\", \"SYNOPTIC\"    Imaging: CT Chest With Contrast Diagnostic    Result Date: 6/20/2024  Impression: 1. Stable CT chest findings since 5/6/2024. 2. Left upper lobe perihilar masslike density is unchanged from the prior CT chest examination, but appears increased in size when compared to a more remote CT chest of 4/17/2023, and remain suspicious for recurrent malignancy at the site of treated disease. 3.. 8 mm short axis prevascular mediastinal lymph node. Stable 4 mm right middle lobe nodule. Electronically Signed: Teressa Lopez MD  6/20/2024 3:33 PM EDT  Workstation ID: [id number]    CT Chest With Contrast Diagnostic    Result Date: 5/7/2024  1. Left hilar soft tissue slowly increasing over multiple prior comparisons in region of previously treated tumor, suspicious for local progression. Short-term follow-up chest CT can be considered versus correlation with EBUS. 2. Overall stable to maybe slightly decreased mediastinal, retrocrural and gastrohepatic adenopathy. 3. Peribronchial thickening with minimal groundglass and nodular opacities in the bilateral lower lobes, likely infectious/inflammatory. Recommend attention on follow-up imaging.  Electronically Signed By-Leonidas Barriga MD On:5/7/2024 8:24 AM  "     XR Sacrum & Coccyx    Result Date: 4/17/2024  Impression: Limited evaluation as described in findings. Osteopenia. Moderate arthritis of the hips. No acute pelvic fractures on radiograph. No displaced sacrococcygeal fracture identified.   Electronically Signed By-Yara Prater MD On:4/17/2024 5:29 PM        Labs:   WBC   Date Value Ref Range Status   11/30/2023 6.69 3.40 - 10.80 10*3/mm3 Final     Hemoglobin   Date Value Ref Range Status   11/30/2023 9.4 (L) 12.0 - 15.9 g/dL Final     Hematocrit   Date Value Ref Range Status   11/30/2023 36.6 34.0 - 46.6 % Final     Platelets   Date Value Ref Range Status   11/30/2023 175 140 - 450 10*3/mm3 Final     Creatinine   Date Value Ref Range Status   06/19/2024 1.10 0.60 - 1.30 mg/dL Final     Comment:     Serial Number: [id number]:  [id number]     BUN   Date Value Ref Range Status   11/30/2023 14 8 - 23 mg/dL Final     eGFR   Date Value Ref Range Status   06/19/2024 56.2 (L) >60.0 mL/min/1.73 Final     TSH   Date Value Ref Range Status   02/17/2020 2.690 0.270 - 4.200 m[iU]/L Final     Assessment / Plan      Impression: Gabriella Jha is a pleasant 63 y.o. female with history of a PET avid left upper lobe nodule presumed primary lung malignancy in 2020. She was not a surgical candidate and was treated for clinical diagnosis of lung cancer. She is status post stereotactic body radiotherapy to left upper lobe, completed on 7/1/2020. She had an excellent radiographic response. She subsequently developed a FDG avid right upper lobe lesion consistent with primary lung cancer as per PET scan dated 9/20/2022. She is status post stereotactic body radiotherapy to the right upper lobe, completed on 10/19/2022. Her post-treatment CT scan of the chest without contrast on 1/9/2023 showed good radiographic treatment response as the previously noted noncalcified nodule in the right upper lobe had essentially resolved. I discussed findings of her recent CT chest on 5/6/24 as the left  hilar soft tissue is slowly increasing over multiple prior comparisons in region of previously treated tumor. Dr. Kurtz independently reviewed images comparing 5/6/24 study to study on 1/9/23 and he does not appreciate a significant increase in size. Discussed findings with Ms. Jha and discussed recommendation of proceeding with EBUS and biopsy of this site. She is not interested in undergoing biopsy at that time monitored with short interval imaging in 6 weeks. Repeat CT chest on 6/19/24 reveals the left upper lobe perihilar masslike density remains unchanged from prior study but appears increased in size when compared to CT chest on 4/17/23 and remains suspicious for recurrent malignancy at the site of treated disease. Recommend proceeding with PET/CT scan for further evaluation and characterization. Depending upon those results will consider order MRI brain and revisit conversation of proceeding with biopsy if indicated.     Pulmonary nodule: CT chest on 6/19/24 demonstrates stable 4 mm right middle lobe nodule. Will continue to monitor on serial imaging.      COPD: patients reports known history of COPD. She has politely declined referral to Pulmonology. I have previously prescribed Albuterol inhaler and will be happy to refill prescription when needed.      Nicotine dependence: she is a current cigarette smoker; 3 cigarettes/day. She verbalizes understanding of need for smoking cessation and is actively decreasing her number of cigarettes per day.      Assessment/Plan:   Diagnoses and all orders for this visit:    1. Malignant neoplasm of upper lobe of left lung (Primary)  -     NM PET/CT Skull Base to Mid Thigh; Future    2. Malignant neoplasm of upper lobe, right bronchus or lung  -     NM PET/CT Skull Base to Mid Thigh; Future    3. Personal history of radiation therapy  -     NM PET/CT Skull Base to Mid Thigh; Future    4. Encounter for follow-up surveillance of lung cancer    5. Abnormal CT of the  chest  -     NM PET/CT Skull Base to Mid Thigh; Future    6. Chronic obstructive pulmonary disease, unspecified COPD type    7. Pulmonary nodule    8. Nicotine dependence, cigarettes, uncomplicated      Follow Up:   Return for follow-up after PET scan.    No follow-ups on file.  Gabriella KUMAR Abhijeet was encouraged to contact me in the interim with any questions or concerns regarding her care.      AMADOR Rangel  Radiation Oncology  [facility]    This document has been signed by AMADOR Banda on June 21, 2024 15:01 EDT

## 2024-06-27 ENCOUNTER — HOSPITAL ENCOUNTER (OUTPATIENT)
Dept: PET IMAGING | Facility: HOSPITAL | Age: 64
Discharge: HOME | End: 2024-06-27
Payer: COMMERCIAL

## 2024-06-27 DIAGNOSIS — C34.11 MALIGNANT NEOPLASM OF UPPER LOBE, RIGHT BRONCHUS OR LUNG: ICD-10-CM

## 2024-06-27 DIAGNOSIS — Z92.3 PERSONAL HISTORY OF RADIATION THERAPY: ICD-10-CM

## 2024-06-27 DIAGNOSIS — R93.89 ABNORMAL CT OF THE CHEST: ICD-10-CM

## 2024-06-27 DIAGNOSIS — C34.12 MALIGNANT NEOPLASM OF UPPER LOBE OF LEFT LUNG: ICD-10-CM

## 2024-06-27 PROCEDURE — 0 FLUDEOXYGLUCOSE F18 SOLUTION: Performed by: NURSE PRACTITIONER

## 2024-06-27 PROCEDURE — A9552 F18 FDG: HCPCS | Performed by: NURSE PRACTITIONER

## 2024-06-27 PROCEDURE — 78815 PET IMAGE W/CT SKULL-THIGH: CPT

## 2024-06-27 RX ADMIN — FLUDEOXYGLUCOSE F 18 1 DOSE: 200 INJECTION, SOLUTION INTRAVENOUS at 12:25

## 2024-07-01 ENCOUNTER — OFFICE VISIT (OUTPATIENT)
Dept: RADIATION ONCOLOGY | Facility: HOSPITAL | Age: 64
End: 2024-07-01
Payer: COMMERCIAL

## 2024-07-01 VITALS
TEMPERATURE: 98.4 F | WEIGHT: 130.7 LBS | RESPIRATION RATE: 12 BRPM | DIASTOLIC BLOOD PRESSURE: 82 MMHG | HEART RATE: 77 BPM | OXYGEN SATURATION: 92 % | BODY MASS INDEX: 24.7 KG/M2 | SYSTOLIC BLOOD PRESSURE: 108 MMHG

## 2024-07-01 DIAGNOSIS — Z71.2 ENCOUNTER TO DISCUSS TEST RESULTS: Primary | ICD-10-CM

## 2024-07-01 DIAGNOSIS — C34.12 MALIGNANT NEOPLASM OF UPPER LOBE OF LEFT LUNG: ICD-10-CM

## 2024-07-01 DIAGNOSIS — R91.1 PULMONARY NODULE: ICD-10-CM

## 2024-07-01 DIAGNOSIS — Z92.3 PERSONAL HISTORY OF RADIATION THERAPY: ICD-10-CM

## 2024-07-01 DIAGNOSIS — C34.11 MALIGNANT NEOPLASM OF UPPER LOBE, RIGHT BRONCHUS OR LUNG: ICD-10-CM

## 2024-07-01 DIAGNOSIS — F17.210 NICOTINE DEPENDENCE, CIGARETTES, UNCOMPLICATED: ICD-10-CM

## 2024-07-01 DIAGNOSIS — J44.9 CHRONIC OBSTRUCTIVE PULMONARY DISEASE, UNSPECIFIED COPD TYPE: ICD-10-CM

## 2024-07-01 PROCEDURE — G0463 HOSPITAL OUTPT CLINIC VISIT: HCPCS | Performed by: NURSE PRACTITIONER

## 2025-01-02 ENCOUNTER — HOSPITAL ENCOUNTER (OUTPATIENT)
Dept: CT IMAGING | Facility: HOSPITAL | Age: 65
Discharge: HOME | End: 2025-01-02
Admitting: NURSE PRACTITIONER
Payer: MEDICARE

## 2025-01-02 DIAGNOSIS — C34.11 MALIGNANT NEOPLASM OF UPPER LOBE, RIGHT BRONCHUS OR LUNG: ICD-10-CM

## 2025-01-02 DIAGNOSIS — C34.12 MALIGNANT NEOPLASM OF UPPER LOBE OF LEFT LUNG: ICD-10-CM

## 2025-01-02 DIAGNOSIS — Z92.3 PERSONAL HISTORY OF RADIATION THERAPY: ICD-10-CM

## 2025-01-02 PROCEDURE — 71250 CT THORAX DX C-: CPT

## 2025-01-07 ENCOUNTER — OFFICE VISIT (OUTPATIENT)
Dept: RADIATION ONCOLOGY | Facility: HOSPITAL | Age: 65
End: 2025-01-07
Payer: MEDICARE

## 2025-01-07 VITALS
SYSTOLIC BLOOD PRESSURE: 135 MMHG | BODY MASS INDEX: 24.95 KG/M2 | WEIGHT: 132.06 LBS | OXYGEN SATURATION: 97 % | HEART RATE: 87 BPM | RESPIRATION RATE: 16 BRPM | DIASTOLIC BLOOD PRESSURE: 86 MMHG | TEMPERATURE: 97.5 F

## 2025-01-07 DIAGNOSIS — Z85.118 ENCOUNTER FOR FOLLOW-UP SURVEILLANCE OF LUNG CANCER: ICD-10-CM

## 2025-01-07 DIAGNOSIS — C34.12 MALIGNANT NEOPLASM OF UPPER LOBE OF LEFT LUNG: Primary | ICD-10-CM

## 2025-01-07 DIAGNOSIS — Z08 ENCOUNTER FOR FOLLOW-UP SURVEILLANCE OF LUNG CANCER: ICD-10-CM

## 2025-01-07 DIAGNOSIS — R91.1 PULMONARY NODULE: ICD-10-CM

## 2025-01-07 DIAGNOSIS — Z92.3 HISTORY OF EXTERNAL BEAM RADIATION THERAPY: ICD-10-CM

## 2025-01-07 DIAGNOSIS — F17.210 NICOTINE DEPENDENCE, CIGARETTES, UNCOMPLICATED: ICD-10-CM

## 2025-01-07 DIAGNOSIS — C34.11 MALIGNANT NEOPLASM OF UPPER LOBE, RIGHT BRONCHUS OR LUNG: ICD-10-CM

## 2025-01-07 DIAGNOSIS — J44.9 CHRONIC OBSTRUCTIVE PULMONARY DISEASE, UNSPECIFIED COPD TYPE: ICD-10-CM

## 2025-01-07 PROCEDURE — G0463 HOSPITAL OUTPT CLINIC VISIT: HCPCS | Performed by: NURSE PRACTITIONER

## 2025-01-07 RX ORDER — ROSUVASTATIN CALCIUM 20 MG/1
TABLET, COATED ORAL
COMMUNITY
Start: 2024-12-05

## 2025-01-07 RX ORDER — BUPROPION HYDROCHLORIDE 150 MG/1
TABLET, EXTENDED RELEASE ORAL
COMMUNITY
Start: 2024-10-28

## 2025-01-07 RX ORDER — GABAPENTIN 600 MG/1
TABLET ORAL
COMMUNITY
Start: 2024-12-13

## 2025-01-07 RX ORDER — LOSARTAN POTASSIUM 50 MG/1
TABLET ORAL
COMMUNITY
Start: 2024-10-28

## 2025-01-07 NOTE — PROGRESS NOTES
Follow Up Office Visit      Encounter Date: 01/07/2025   Patient Name: Gabriella Jha  YOB: 1960   Medical Record Number: [id number]   Primary Diagnosis: Malignant neoplasm of upper lobe of left lung [C34.12]     Radiation Completion Date:     10/19/2022 RUL SBRT   7/1/2020 ETELVINA SBRT    Chief Complaint:    Chief Complaint   Patient presents with    Follow-up    Lung Cancer       Oncology/Hematology History   Malignant neoplasm of upper lobe, right bronchus or lung   9/30/2022 Initial Diagnosis    Malignant neoplasm of upper lobe, right bronchus or lung (HCC)     10/10/2022 - 10/19/2022 Radiation    Radiation OncologyTreatment Course:  Gabriella Jha received 5500 cGy in 5 fractions to right upper lobe via SBRT.         History of Present Illness: Gabriella Jha is a 64 y.o. female who returns to [facility] for routine follow-up of her lung cancer. She reports feeling well overall with no new complaints or concerns. She denies any significant changes in her breathing since prior visit. She reports mild shortness of breath with exertion that does not hinder her ADLs. She has occasional cough productive of clear sputum. She denies headaches, hemoptysis, chest tightness, confusion, focal weakness or weight loss. She does complain of frequent and daily headaches that have been ongoing for the past year. Headaches located in occipital region. She reports that her prior PCP used to have her on medication for headaches and that was helpful. She has an appointment with her current PCP within the next month. Encouraged she discuss her headaches at this visit with PCP. Will consider ordering MRI brain if headaches persist.     Subjective      Review of Systems: Review of Systems   Constitutional:  Positive for fatigue (3/10). Negative for appetite change, chills, fever and unexpected weight change.   HENT:  Negative for hearing loss, sore throat and trouble swallowing.    Eyes:  Negative  "for visual disturbance.   Respiratory:  Positive for cough (Productive with white secretions, ongoing.) and wheezing (Occasionally, ongoing). Negative for chest tightness and shortness of breath.    Cardiovascular:  Negative for chest pain, palpitations and leg swelling.   Gastrointestinal:  Positive for abdominal distention (\"feels tight\"). Negative for abdominal pain, anal bleeding, blood in stool, constipation, diarrhea, nausea and vomiting.   Genitourinary:  Negative for difficulty urinating, dysuria, frequency, hematuria and urgency.   Musculoskeletal:  Negative for arthralgias, back pain, gait problem and joint swelling.   Skin:  Negative for color change and rash.   Neurological:  Positive for dizziness (Noted with postural changes, ongoing) and headaches. Negative for tremors, seizures, syncope, speech difficulty, weakness and numbness. Facial asymmetry: Frequent, noted QOD, noted x1 year.  Noted at random times..  Psychiatric/Behavioral:  Negative for sleep disturbance.        The following portions of the patient's history were reviewed and updated as appropriate: allergies, current medications, past family history, past medical history, past social history, past surgical history and problem list.    Medications:     Current Outpatient Medications:     albuterol sulfate  (90 Base) MCG/ACT inhaler, Inhale 2 puffs Every 4 (Four) Hours As Needed for Wheezing or Shortness of Air., Disp: 18 g, Rfl: 4    ARIPiprazole (ABILIFY) 30 MG tablet, Take 1 tablet by mouth Every Night., Disp: , Rfl:     buPROPion SR (WELLBUTRIN SR) 150 MG 12 hr tablet, , Disp: , Rfl:     Cholecalciferol (Vitamin D3) 1.25 MG (74407 UT) capsule, Take 1 capsule by mouth Every 7 (Seven) Days.  , Disp: , Rfl:     clonazePAM (KlonoPIN) 1 MG tablet, Take 1 tablet by mouth 2 (Two) Times a Day As Needed., Disp: , Rfl:     clopidogrel (PLAVIX) 75 MG tablet, Take 1 tablet by mouth Daily., Disp: , Rfl:     DULoxetine HCl (CYMBALTA PO), Take 1 " capsule by mouth Daily., Disp: , Rfl:     Ferrous Sulfate (IRON PO), Take 1 tablet by mouth Daily., Disp: , Rfl:     gabapentin (NEURONTIN) 600 MG tablet, , Disp: , Rfl:     losartan (COZAAR) 50 MG tablet, , Disp: , Rfl:     rosuvastatin (CRESTOR) 20 MG tablet, , Disp: , Rfl:     tiotropium bromide-olodaterol (Stiolto Respimat) 2.5-2.5 MCG/ACT aerosol solution inhaler, Inhale 2 puffs Daily., Disp: 4 g, Rfl: 11    venlafaxine XR (EFFEXOR-XR) 150 MG 24 hr capsule, Take 1 capsule by mouth Daily., Disp: , Rfl:     guaiFENesin (Mucinex) 600 MG 12 hr tablet, Take 2 tablets by mouth 2 (Two) Times a Day. (Patient not taking: Reported on 1/7/2025), Disp: 60 tablet, Rfl: 2    Allergies:   Allergies   Allergen Reactions    Clindamycin Hcl Rash       Patient Smoking History:   Social History     Tobacco Use   Smoking Status Every Day    Current packs/day: 0.25    Average packs/day: 0.3 packs/day for 50.1 years (12.5 ttl pk-yrs)    Types: Cigarettes    Start date: 11/18/1974   Smokeless Tobacco Never       Measures:  PHQ-9 Total Score: 0   Quality of Life: 90 - Limited Activity   ECOG score: 1  ECOG: Restricted in physically strenuous activity but ambulatory and able to carry out work of a light or sedentary nature, e.g., light house work, office work   Pain: (on a scale of 0-10)   Pain Score    01/07/25 1207   PainSc: 0-No pain     Objective     Physical Exam:   Vital Signs:   Vitals:    01/07/25 1207   BP: 135/86   Pulse: 87   Resp: 16   Temp: 97.5 °F (36.4 °C)   TempSrc: Temporal   SpO2: 97%   Weight: 59.9 kg (132 lb 0.9 oz)   PainSc: 0-No pain     Body mass index is 24.95 kg/m².   Wt Readings from Last 3 Encounters:   01/07/25 59.9 kg (132 lb 0.9 oz)   07/01/24 59.3 kg (130 lb 11.2 oz)   06/21/24 59.3 kg (130 lb 11.7 oz)       Physical Exam  Vitals reviewed.   Constitutional:       General: She is not in acute distress.     Appearance: Normal appearance. She is normal weight. She is not ill-appearing.   HENT:      Head:  "Normocephalic and atraumatic.      Mouth/Throat:      Mouth: Mucous membranes are moist.      Pharynx: Oropharynx is clear. No oropharyngeal exudate or posterior oropharyngeal erythema.   Eyes:      Conjunctiva/sclera: Conjunctivae normal.      Pupils: Pupils are equal, round, and reactive to light.   Cardiovascular:      Rate and Rhythm: Normal rate and regular rhythm.      Pulses: Normal pulses.      Heart sounds: Normal heart sounds.   Pulmonary:      Effort: Pulmonary effort is normal. No respiratory distress.      Breath sounds: Wheezing (faint expiratory wheeze) present. No rhonchi or rales.      Comments: Decreased throughout  Abdominal:      General: Bowel sounds are normal.      Palpations: Abdomen is soft.   Musculoskeletal:         General: Normal range of motion.      Cervical back: Normal range of motion.   Skin:     General: Skin is warm and dry.   Neurological:      General: No focal deficit present.      Mental Status: She is alert and oriented to person, place, and time. Mental status is at baseline.   Psychiatric:         Mood and Affect: Mood normal.         Behavior: Behavior normal.       Result Review: I independently reviewed the following data. I discussed 1/2/25 CT chest with Dr. Kurtz and he independently reviewed images.   -- CT Chest Without Contrast Diagnostic (01/02/2025 13:48)   -- NM PET/CT Skull Base to Mid Thigh (06/27/2024 13:30)   -- CT Chest With Contrast Diagnostic (06/19/2024 16:12)     Pathology: No results found for: \"CLININFO\", \"FINALDX\", \"SYNOPTIC\"    Imaging: CT Chest Without Contrast Diagnostic    Result Date: 1/6/2025  Impression: CT scan of the chest without IV contrast demonstrating left upper lobe perihilar mass questionably slightly larger in comparison to 6/27/2024. Stable anterior mediastinal lymph node. Stable 4 mm right middle lobe nodule. Electronically Signed: Rajesh Arango MD  1/6/2025 8:46 AM EST  Workstation ID: [id number]          Labs:   WBC   Date Value " Ref Range Status   11/30/2023 6.69 3.40 - 10.80 10*3/mm3 Final     Hemoglobin   Date Value Ref Range Status   11/30/2023 9.4 (L) 12.0 - 15.9 g/dL Final     Hematocrit   Date Value Ref Range Status   11/30/2023 36.6 34.0 - 46.6 % Final     Platelets   Date Value Ref Range Status   11/30/2023 175 140 - 450 10*3/mm3 Final     Creatinine   Date Value Ref Range Status   06/19/2024 1.10 0.60 - 1.30 mg/dL Final     Comment:     Serial Number: [id number]:  [id number]     BUN   Date Value Ref Range Status   11/30/2023 14 8 - 23 mg/dL Final     eGFR   Date Value Ref Range Status   06/19/2024 56.2 (L) >60.0 mL/min/1.73 Final     TSH   Date Value Ref Range Status   02/17/2020 2.690 0.270 - 4.200 m[iU]/L Final     Assessment / Plan      Impression: Gabriella Jha is a pleasant 64 y.o. female with history of a PET avid left upper lobe nodule presumed primary lung malignancy in 2020. She was not a surgical candidate and was treated for clinical diagnosis of lung cancer. She is status post stereotactic body radiotherapy to left upper lobe, completed on 7/1/2020. She had an excellent radiographic response. She subsequently developed a FDG avid right upper lobe lesion consistent with primary lung cancer as per PET scan dated 9/20/2022. She is status post stereotactic body radiotherapy to the right upper lobe, completed on 10/19/2022. Her post-treatment CT scan of the chest without contrast on 1/9/2023 showed good radiographic treatment response as the previously noted noncalcified nodule in the right upper lobe had essentially resolved. I discussed findings of her recent CT chest on 5/6/24 as the left hilar soft tissue is slowly increasing over multiple prior comparisons in region of previously treated tumor. Dr. Kurtz independently reviewed images comparing 5/6/24 study to study on 1/9/23 and he does not appreciate a significant increase in size. Discussed findings with Ms. Jha and discussed recommendation of proceeding with  EBUS and biopsy of this site. She is not interested in undergoing biopsy at that time monitored with short interval imaging in 6 weeks. Repeat CT chest on 6/19/24 reveals the left upper lobe perihilar masslike density remains unchanged from prior study but appears increased in size when compared to CT chest on 4/17/23 and remains suspicious for recurrent malignancy at the site of treated disease. She subsequently underwent PET/CT scan on 6/27/24 which demonstrates left perihilar airspace disease consistent with treatment related change. This has low-level metabolic activity. No definite residual or recurrent disease identified. CT Chest on 1/2/25 independently reviewed by Dr. Kurtz and he favors the left upper lobe perihilar mass to be unchanged in size although it is slightly more consolidated. The left perihilar opacity is indistinguishable from hilar vessels so we will order next scan with IV contrast. There is an adjacent nodular and rounded cavitation that is larger and more pronounced compared to prior CT scan on 6/19/24, best seen on image 51.This finding is reported by the radiologist. We will repeat imaging in 3 months for surveillance.     Pulmonary nodule: CT chest on 1/2/25 demonstrates stable 4 mm right middle lobe nodule. Will continue to monitor on serial imaging.      COPD: patients reports known history of COPD. She has politely declined referral to Pulmonology. I have previously prescribed Albuterol inhaler and will be happy to refill prescription when needed.      Nicotine dependence: she is a current cigarette smoker; 3 cigarettes/day. She verbalizes understanding of need for smoking cessation and is actively decreasing her number of cigarettes per day.      Assessment/Plan:   Diagnoses and all orders for this visit:    1. Malignant neoplasm of upper lobe of left lung (Primary)  -     CT Chest With Contrast; Future    2. Malignant neoplasm of upper lobe, right bronchus or lung  -     CT Chest  With Contrast; Future    3. History of external beam radiation therapy  -     CT Chest With Contrast; Future    4. Encounter for follow-up surveillance of lung cancer    5. Chronic obstructive pulmonary disease, unspecified COPD type  -     CT Chest With Contrast; Future    6. Pulmonary nodule  -     CT Chest With Contrast; Future    7. Nicotine dependence, cigarettes, uncomplicated      Follow Up:   Return for follow-up in 3 months with CT chest prior.     Return in about 3 months (around 4/7/2025) for Office Visit.  Gabriella Jha was encouraged to contact me in the interim with any questions or concerns regarding her care.      AMADOR Rangel  Radiation Oncology  [facility]    This document has been signed by AMADOR Banda on January 7, 2025 12:45 EST

## 2025-02-03 ENCOUNTER — APPOINTMENT (OUTPATIENT)
Dept: CT IMAGING | Facility: HOSPITAL | Age: 65
End: 2025-02-03
Payer: MEDICARE

## 2025-02-03 ENCOUNTER — APPOINTMENT (OUTPATIENT)
Dept: GENERAL RADIOLOGY | Facility: HOSPITAL | Age: 65
End: 2025-02-03
Payer: MEDICARE

## 2025-02-03 ENCOUNTER — HOSPITAL ENCOUNTER (OUTPATIENT)
Facility: HOSPITAL | Age: 65
Setting detail: OBSERVATION
Discharge: HOME | End: 2025-02-04
Attending: EMERGENCY MEDICINE | Admitting: STUDENT IN AN ORGANIZED HEALTH CARE EDUCATION/TRAINING PROGRAM
Payer: MEDICARE

## 2025-02-03 ENCOUNTER — APPOINTMENT (OUTPATIENT)
Dept: CARDIOLOGY | Facility: HOSPITAL | Age: 65
End: 2025-02-03
Payer: MEDICARE

## 2025-02-03 ENCOUNTER — APPOINTMENT (OUTPATIENT)
Dept: MRI IMAGING | Facility: HOSPITAL | Age: 65
End: 2025-02-03
Payer: MEDICARE

## 2025-02-03 DIAGNOSIS — R26.2 DIFFICULTY IN WALKING: ICD-10-CM

## 2025-02-03 DIAGNOSIS — R53.1 LEFT-SIDED WEAKNESS: Primary | ICD-10-CM

## 2025-02-03 PROBLEM — I63.9 ISCHEMIC STROKE: Status: ACTIVE | Noted: 2025-02-03

## 2025-02-03 LAB
ABO GROUP BLD: NORMAL
ALBUMIN SERPL-MCNC: 4 G/DL (ref 3.5–5.2)
ALBUMIN/GLOB SERPL: 1.2 G/DL
ALP SERPL-CCNC: 99 U/L (ref 39–117)
ALT SERPL W P-5'-P-CCNC: 12 U/L (ref 1–33)
AMPHET+METHAMPHET UR QL: NEGATIVE
AMPHETAMINES UR QL: NEGATIVE
ANION GAP SERPL CALCULATED.3IONS-SCNC: 9 MMOL/L (ref 5–15)
APTT PPP: 32.6 SECONDS (ref 24.2–34.2)
AST SERPL-CCNC: 19 U/L (ref 1–32)
BACTERIA UR QL AUTO: ABNORMAL /HPF
BARBITURATES UR QL SCN: NEGATIVE
BASOPHILS # BLD AUTO: 0.04 10*3/MM3 (ref 0–0.2)
BASOPHILS NFR BLD AUTO: 0.6 % (ref 0–1.5)
BENZODIAZ UR QL SCN: NEGATIVE
BILIRUB SERPL-MCNC: 0.2 MG/DL (ref 0–1.2)
BILIRUB UR QL STRIP: NEGATIVE
BLD GP AB SCN SERPL QL: NEGATIVE
BUN SERPL-MCNC: 11 MG/DL (ref 8–23)
BUN/CREAT SERPL: 9.6 (ref 7–25)
BUPRENORPHINE SERPL-MCNC: NEGATIVE NG/ML
CALCIUM SPEC-SCNC: 10 MG/DL (ref 8.6–10.5)
CANNABINOIDS SERPL QL: NEGATIVE
CHLORIDE SERPL-SCNC: 100 MMOL/L (ref 98–107)
CLARITY UR: CLEAR
CO2 SERPL-SCNC: 31 MMOL/L (ref 22–29)
COCAINE UR QL: NEGATIVE
COLOR UR: YELLOW
CREAT SERPL-MCNC: 1.15 MG/DL (ref 0.57–1)
DEPRECATED RDW RBC AUTO: 43.6 FL (ref 37–54)
EGFRCR SERPLBLD CKD-EPI 2021: 53 ML/MIN/1.73
EOSINOPHIL # BLD AUTO: 0.26 10*3/MM3 (ref 0–0.4)
EOSINOPHIL NFR BLD AUTO: 3.6 % (ref 0.3–6.2)
ERYTHROCYTE [DISTWIDTH] IN BLOOD BY AUTOMATED COUNT: 12.9 % (ref 12.3–15.4)
FENTANYL UR-MCNC: NEGATIVE NG/ML
GEN 5 1HR TROPONIN T REFLEX: 11 NG/L
GLOBULIN UR ELPH-MCNC: 3.3 GM/DL
GLUCOSE BLDC GLUCOMTR-MCNC: 74 MG/DL (ref 70–99)
GLUCOSE BLDC GLUCOMTR-MCNC: 76 MG/DL (ref 70–99)
GLUCOSE BLDC GLUCOMTR-MCNC: 83 MG/DL (ref 70–99)
GLUCOSE SERPL-MCNC: 71 MG/DL (ref 65–99)
GLUCOSE UR STRIP-MCNC: NEGATIVE MG/DL
HCT VFR BLD AUTO: 41.9 % (ref 34–46.6)
HGB BLD-MCNC: 13.5 G/DL (ref 12–15.9)
HGB UR QL STRIP.AUTO: NEGATIVE
HOLD SPECIMEN: NORMAL
HOLD SPECIMEN: NORMAL
HYALINE CASTS UR QL AUTO: ABNORMAL /LPF
IMM GRANULOCYTES # BLD AUTO: 0.01 10*3/MM3 (ref 0–0.05)
IMM GRANULOCYTES NFR BLD AUTO: 0.1 % (ref 0–0.5)
INR PPP: 0.94 (ref 0.86–1.15)
KETONES UR QL STRIP: NEGATIVE
LEUKOCYTE ESTERASE UR QL STRIP.AUTO: ABNORMAL
LYMPHOCYTES # BLD AUTO: 0.89 10*3/MM3 (ref 0.7–3.1)
LYMPHOCYTES NFR BLD AUTO: 12.4 % (ref 19.6–45.3)
MAGNESIUM SERPL-MCNC: 2 MG/DL (ref 1.6–2.4)
MCH RBC QN AUTO: 29.9 PG (ref 26.6–33)
MCHC RBC AUTO-ENTMCNC: 32.2 G/DL (ref 31.5–35.7)
MCV RBC AUTO: 92.7 FL (ref 79–97)
METHADONE UR QL SCN: NEGATIVE
MONOCYTES # BLD AUTO: 0.49 10*3/MM3 (ref 0.1–0.9)
MONOCYTES NFR BLD AUTO: 6.8 % (ref 5–12)
NEUTROPHILS NFR BLD AUTO: 5.5 10*3/MM3 (ref 1.7–7)
NEUTROPHILS NFR BLD AUTO: 76.5 % (ref 42.7–76)
NITRITE UR QL STRIP: NEGATIVE
NRBC BLD AUTO-RTO: 0 /100 WBC (ref 0–0.2)
OPIATES UR QL: NEGATIVE
OXYCODONE UR QL SCN: NEGATIVE
PCP UR QL SCN: NEGATIVE
PH UR STRIP.AUTO: 7 [PH] (ref 5–8)
PLATELET # BLD AUTO: 200 10*3/MM3 (ref 140–450)
PMV BLD AUTO: 9.7 FL (ref 6–12)
POTASSIUM SERPL-SCNC: 4.4 MMOL/L (ref 3.5–5.2)
PROT SERPL-MCNC: 7.3 G/DL (ref 6–8.5)
PROT UR QL STRIP: NEGATIVE
PROTHROMBIN TIME: 12.7 SECONDS (ref 11.8–14.9)
QT INTERVAL: 379 MS
QTC INTERVAL: 408 MS
RBC # BLD AUTO: 4.52 10*6/MM3 (ref 3.77–5.28)
RBC # UR STRIP: ABNORMAL /HPF
REF LAB TEST METHOD: ABNORMAL
RH BLD: NEGATIVE
SODIUM SERPL-SCNC: 140 MMOL/L (ref 136–145)
SP GR UR STRIP: >1.03 (ref 1–1.03)
SQUAMOUS #/AREA URNS HPF: ABNORMAL /HPF
T&S EXPIRATION DATE: NORMAL
TRICYCLICS UR QL SCN: NEGATIVE
TROPONIN T NUMERIC DELTA: 3 NG/L
TROPONIN T SERPL HS-MCNC: 8 NG/L
UROBILINOGEN UR QL STRIP: ABNORMAL
WBC # UR STRIP: ABNORMAL /HPF
WBC NRBC COR # BLD AUTO: 7.19 10*3/MM3 (ref 3.4–10.8)
WHOLE BLOOD HOLD COAG: NORMAL
WHOLE BLOOD HOLD SPECIMEN: NORMAL

## 2025-02-03 PROCEDURE — 80053 COMPREHEN METABOLIC PANEL: CPT | Performed by: EMERGENCY MEDICINE

## 2025-02-03 PROCEDURE — 83735 ASSAY OF MAGNESIUM: CPT | Performed by: EMERGENCY MEDICINE

## 2025-02-03 PROCEDURE — 71101 X-RAY EXAM UNILAT RIBS/CHEST: CPT

## 2025-02-03 PROCEDURE — 99291 CRITICAL CARE FIRST HOUR: CPT

## 2025-02-03 PROCEDURE — 82948 REAGENT STRIP/BLOOD GLUCOSE: CPT | Performed by: EMERGENCY MEDICINE

## 2025-02-03 PROCEDURE — 36415 COLL VENOUS BLD VENIPUNCTURE: CPT | Performed by: EMERGENCY MEDICINE

## 2025-02-03 PROCEDURE — G0378 HOSPITAL OBSERVATION PER HR: HCPCS

## 2025-02-03 PROCEDURE — 70496 CT ANGIOGRAPHY HEAD: CPT

## 2025-02-03 PROCEDURE — 94799 UNLISTED PULMONARY SVC/PX: CPT

## 2025-02-03 PROCEDURE — 99204 OFFICE O/P NEW MOD 45 MIN: CPT | Performed by: PSYCHIATRY & NEUROLOGY

## 2025-02-03 PROCEDURE — 85730 THROMBOPLASTIN TIME PARTIAL: CPT | Performed by: EMERGENCY MEDICINE

## 2025-02-03 PROCEDURE — 80307 DRUG TEST PRSMV CHEM ANLYZR: CPT | Performed by: INTERNAL MEDICINE

## 2025-02-03 PROCEDURE — 86900 BLOOD TYPING SEROLOGIC ABO: CPT | Performed by: EMERGENCY MEDICINE

## 2025-02-03 PROCEDURE — 85610 PROTHROMBIN TIME: CPT

## 2025-02-03 PROCEDURE — 93005 ELECTROCARDIOGRAM TRACING: CPT | Performed by: EMERGENCY MEDICINE

## 2025-02-03 PROCEDURE — 85025 COMPLETE CBC W/AUTO DIFF WBC: CPT | Performed by: EMERGENCY MEDICINE

## 2025-02-03 PROCEDURE — 25510000001 IOPAMIDOL PER 1 ML: Performed by: EMERGENCY MEDICINE

## 2025-02-03 PROCEDURE — 84484 ASSAY OF TROPONIN QUANT: CPT | Performed by: EMERGENCY MEDICINE

## 2025-02-03 PROCEDURE — 86901 BLOOD TYPING SEROLOGIC RH(D): CPT | Performed by: EMERGENCY MEDICINE

## 2025-02-03 PROCEDURE — 86850 RBC ANTIBODY SCREEN: CPT | Performed by: EMERGENCY MEDICINE

## 2025-02-03 PROCEDURE — 70551 MRI BRAIN STEM W/O DYE: CPT

## 2025-02-03 PROCEDURE — 70450 CT HEAD/BRAIN W/O DYE: CPT

## 2025-02-03 PROCEDURE — 70498 CT ANGIOGRAPHY NECK: CPT

## 2025-02-03 PROCEDURE — 94640 AIRWAY INHALATION TREATMENT: CPT

## 2025-02-03 PROCEDURE — 82948 REAGENT STRIP/BLOOD GLUCOSE: CPT | Performed by: INTERNAL MEDICINE

## 2025-02-03 PROCEDURE — 81001 URINALYSIS AUTO W/SCOPE: CPT | Performed by: EMERGENCY MEDICINE

## 2025-02-03 PROCEDURE — 93306 TTE W/DOPPLER COMPLETE: CPT

## 2025-02-03 PROCEDURE — 82948 REAGENT STRIP/BLOOD GLUCOSE: CPT

## 2025-02-03 PROCEDURE — 0042T HC CT CEREBRAL PERFUSION W/WO CONTRAST: CPT

## 2025-02-03 RX ORDER — ARFORMOTEROL TARTRATE 15 UG/2ML
15 SOLUTION RESPIRATORY (INHALATION)
Status: DISCONTINUED | OUTPATIENT
Start: 2025-02-03 | End: 2025-02-04 | Stop reason: HOSPADM

## 2025-02-03 RX ORDER — IPRATROPIUM BROMIDE AND ALBUTEROL SULFATE 2.5; .5 MG/3ML; MG/3ML
3 SOLUTION RESPIRATORY (INHALATION) EVERY 6 HOURS PRN
Status: DISCONTINUED | OUTPATIENT
Start: 2025-02-03 | End: 2025-02-04 | Stop reason: HOSPADM

## 2025-02-03 RX ORDER — ASPIRIN 81 MG/1
81 TABLET, CHEWABLE ORAL DAILY
Status: DISCONTINUED | OUTPATIENT
Start: 2025-02-03 | End: 2025-02-04 | Stop reason: HOSPADM

## 2025-02-03 RX ORDER — SODIUM CHLORIDE 0.9 % (FLUSH) 0.9 %
10 SYRINGE (ML) INJECTION AS NEEDED
Status: DISCONTINUED | OUTPATIENT
Start: 2025-02-03 | End: 2025-02-04 | Stop reason: HOSPADM

## 2025-02-03 RX ORDER — IOPAMIDOL 755 MG/ML
100 INJECTION, SOLUTION INTRAVASCULAR
Status: COMPLETED | OUTPATIENT
Start: 2025-02-03 | End: 2025-02-03

## 2025-02-03 RX ORDER — ASPIRIN 300 MG/1
300 SUPPOSITORY RECTAL DAILY
Status: DISCONTINUED | OUTPATIENT
Start: 2025-02-03 | End: 2025-02-04 | Stop reason: HOSPADM

## 2025-02-03 RX ORDER — ATORVASTATIN CALCIUM 40 MG/1
80 TABLET, FILM COATED ORAL NIGHTLY
Status: DISCONTINUED | OUTPATIENT
Start: 2025-02-03 | End: 2025-02-04 | Stop reason: HOSPADM

## 2025-02-03 RX ORDER — SODIUM CHLORIDE 0.9 % (FLUSH) 0.9 %
10 SYRINGE (ML) INJECTION EVERY 12 HOURS SCHEDULED
Status: DISCONTINUED | OUTPATIENT
Start: 2025-02-03 | End: 2025-02-04 | Stop reason: HOSPADM

## 2025-02-03 RX ORDER — BUDESONIDE 0.5 MG/2ML
0.5 INHALANT ORAL
Status: DISCONTINUED | OUTPATIENT
Start: 2025-02-03 | End: 2025-02-04 | Stop reason: HOSPADM

## 2025-02-03 RX ORDER — SODIUM CHLORIDE 9 MG/ML
40 INJECTION, SOLUTION INTRAVENOUS AS NEEDED
Status: DISCONTINUED | OUTPATIENT
Start: 2025-02-03 | End: 2025-02-04 | Stop reason: HOSPADM

## 2025-02-03 RX ADMIN — BUDESONIDE 0.5 MG: 0.5 INHALANT RESPIRATORY (INHALATION) at 18:55

## 2025-02-03 RX ADMIN — ATORVASTATIN CALCIUM 80 MG: 40 TABLET, FILM COATED ORAL at 21:46

## 2025-02-03 RX ADMIN — ARFORMOTEROL TARTRATE 15 MCG: 15 SOLUTION RESPIRATORY (INHALATION) at 18:55

## 2025-02-03 RX ADMIN — IOPAMIDOL 100 ML: 755 INJECTION, SOLUTION INTRAVENOUS at 14:02

## 2025-02-03 RX ADMIN — ASPIRIN 81 MG CHEWABLE TABLET 81 MG: 81 TABLET CHEWABLE at 18:09

## 2025-02-03 RX ADMIN — IOPAMIDOL 40 ML: 755 INJECTION, SOLUTION INTRAVENOUS at 14:01

## 2025-02-03 RX ADMIN — Medication 10 ML: at 21:47

## 2025-02-03 NOTE — ED PROVIDER NOTES
Time: 12:38 PM EST  Date of encounter:  2/3/2025  Independent Historian/Clinical History and Information was obtained by:   Patient and Family    History is limited by: N/A    Chief Complaint   Patient presents with    Fall     Fell this morning on her right side, fell on right side, states she gets dizzy         History of Present Illness:  Patient is a 64 y.o. year old female who presents to the emergency department for evaluation of fall.  Patient states she got up this morning and she fell.  She states she falls often so she is not really sure what caused her to fall.  She has had some intermittent dizziness.  Patient is now complaining of right rib pain since the time of the fall.  Patient has been states the patient speech seems different than it was before and he is concerned because she has a history of a stroke approximately 4 years ago.  According the patient she had no previous deficits.  (Provider in triage, Rishabh Dominique PA-C)    Patient Care Team  Primary Care Provider: Carolina Parra APRN    Past Medical History:     Allergies   Allergen Reactions    Clindamycin Hcl Rash     Past Medical History:   Diagnosis Date    Anxiety     CHF (congestive heart failure)     COPD (chronic obstructive pulmonary disease)     GERD (gastroesophageal reflux disease)     Hyperlipemia     Hypertension     Lung cancer     Migraines      Past Surgical History:   Procedure Laterality Date     SECTION      HYSTERECTOMY       History reviewed. No pertinent family history.    Home Medications:  Prior to Admission medications    Medication Sig Start Date End Date Taking? Authorizing Provider   albuterol sulfate  (90 Base) MCG/ACT inhaler Inhale 2 puffs Every 4 (Four) Hours As Needed for Wheezing or Shortness of Air. 23   Marcia Pardo APRN   ARIPiprazole (ABILIFY) 30 MG tablet Take 1 tablet by mouth Every Night. 9/10/21   Provider, MD Estefany   buPROPion SR (WELLBUTRIN SR) 150 MG 12 hr tablet  10/28/24    Estefany Washington MD   Cholecalciferol (Vitamin D3) 1.25 MG (73154 UT) capsule Take 1 capsule by mouth Every 7 (Seven) Days.   6/28/22   Estefany Washington MD   clonazePAM (KlonoPIN) 1 MG tablet Take 1 tablet by mouth 2 (Two) Times a Day As Needed.    Estefany Washington MD   clopidogrel (PLAVIX) 75 MG tablet Take 1 tablet by mouth Daily. 8/24/21   Estefany Washington MD   DULoxetine HCl (CYMBALTA PO) Take 1 capsule by mouth Daily.    Estefany Washington MD   Ferrous Sulfate (IRON PO) Take 1 tablet by mouth Daily.    Estefany Washington MD   gabapentin (NEURONTIN) 600 MG tablet  12/13/24   Estefany Washington MD   guaiFENesin (Mucinex) 600 MG 12 hr tablet Take 2 tablets by mouth 2 (Two) Times a Day.  Patient not taking: Reported on 1/7/2025 5/7/24   Marcia Pardo APRN   losartan (COZAAR) 50 MG tablet  10/28/24   Estefany Washington MD   rosuvastatin (CRESTOR) 20 MG tablet  12/5/24   Estefany Washington MD   tiotropium bromide-olodaterol (Stiolto Respimat) 2.5-2.5 MCG/ACT aerosol solution inhaler Inhale 2 puffs Daily. 11/30/23   Brandon Fenton MD   venlafaxine XR (EFFEXOR-XR) 150 MG 24 hr capsule Take 1 capsule by mouth Daily. 9/3/21   Estefany Washington MD        Social History:   Social History     Tobacco Use    Smoking status: Every Day     Current packs/day: 0.25     Average packs/day: 0.3 packs/day for 50.2 years (12.6 ttl pk-yrs)     Types: Cigarettes     Start date: 11/18/1974    Smokeless tobacco: Never   Vaping Use    Vaping status: Never Used   Substance Use Topics    Alcohol use: Not Currently    Drug use: Never         Review of Systems:  Review of Systems   Constitutional:  Negative for chills and fever.   HENT:  Negative for congestion, rhinorrhea and sore throat.    Eyes:  Negative for pain and visual disturbance.   Respiratory:  Negative for apnea, cough, chest tightness and shortness of breath.    Cardiovascular:  Negative for chest pain and palpitations.  "  Gastrointestinal:  Negative for abdominal pain, diarrhea, nausea and vomiting.   Genitourinary:  Negative for difficulty urinating and dysuria.   Musculoskeletal:  Negative for joint swelling and myalgias.   Skin:  Negative for color change.   Neurological:  Positive for weakness. Negative for seizures and headaches.   Psychiatric/Behavioral: Negative.     All other systems reviewed and are negative.       Physical Exam:  /95   Pulse 72   Temp 98.4 °F (36.9 °C) (Oral)   Resp 17   Ht 154.9 cm (61\")   Wt 58.3 kg (128 lb 8.5 oz)   SpO2 93%   BMI 24.29 kg/m²         Physical Exam  Vitals and nursing note reviewed.   Constitutional:       General: She is not in acute distress.     Appearance: Normal appearance. She is not toxic-appearing.   HENT:      Head: Normocephalic and atraumatic.      Jaw: There is normal jaw occlusion.   Eyes:      General: Lids are normal.      Extraocular Movements: Extraocular movements intact.      Conjunctiva/sclera: Conjunctivae normal.      Pupils: Pupils are equal, round, and reactive to light.   Cardiovascular:      Rate and Rhythm: Normal rate and regular rhythm.      Pulses: Normal pulses.      Heart sounds: Normal heart sounds.   Pulmonary:      Effort: Pulmonary effort is normal. No respiratory distress.      Breath sounds: Normal breath sounds. No wheezing or rhonchi.   Abdominal:      General: Abdomen is flat.      Palpations: Abdomen is soft.      Tenderness: There is no abdominal tenderness. There is no guarding or rebound.   Musculoskeletal:         General: Normal range of motion.      Cervical back: Normal range of motion and neck supple.      Right lower leg: No edema.      Left lower leg: No edema.   Skin:     General: Skin is warm and dry.   Neurological:      Mental Status: She is alert and oriented to person, place, and time.      Comments: Left arm and leg weakness   Psychiatric:         Mood and Affect: Mood normal.                            Medical " Decision Making:      Comorbidities that affect care:    Stroke, hypertension    External Notes reviewed:    None      The following orders were placed and all results were independently analyzed by me:  Orders Placed This Encounter   Procedures    CT Head Without Contrast    XR Ribs Right With PA Chest    CT Angiogram Head w AI Analysis of LVO    CT Angiogram Neck    CT CEREBRAL PERFUSION WITH & WITHOUT CONTRAST    [place] Draw    Comprehensive Metabolic Panel    High Sensitivity Troponin T    Magnesium    Urinalysis With Microscopic If Indicated (No Culture) - Urine, Clean Catch    CBC Auto Differential    Protime-INR    aPTT    High Sensitivity Troponin T 1Hr    Urinalysis, Microscopic Only - Urine, Clean Catch    NPO Diet NPO Type: Strict NPO    Undress & Gown    Vital Signs    Orthostatic Blood Pressure    Initiate Department's Acute Stroke Process (Team D, Code 19, etc)    Perform NIH Stroke Scale    Measure Actual Weight    Head of Bed 30 Degrees or Less    Undress and Gown    Continuous Pulse Oximetry    Vital Signs    Neuro Checks    Notify Provider for SBP <80 or >200    Notify Provider for SBP >140 (For Hemorrhagic Stroke)    No Hypotonic Fluids    Nursing Dysphagia Screening (Complete Prior to Giving anything PO)    RN to Place Order SLP Consult (IF swallow screen failed) - Eval & Treat Choosing Reason of RN Dysphagia Screen Failed    Code Status and Medical Interventions: CPR (Attempt to Resuscitate); Full Support    Inpatient Hospitalist Consult    Oxygen Therapy- Nasal Cannula; Titrate 1-6 LPM Per SpO2; 90 - 95%    Oxygen Therapy- Nasal Cannula; Titrate 1-6 LPM Per SpO2; 90 - 95%    POC Glucose Once    POC Glucose Once    ECG 12 Lead ED Triage Standing Order; Weak / Dizzy / AMS    Type & Screen    Insert Peripheral IV    Insert Large Bore Peripheral IV - Right AC Preferred    Initiate Observation Status    Fall Precautions    CBC & Differential    Green Top (Gel)    Lavender Top    Gold Top - SST     Light Blue Top       Medications Given in the Emergency Department:  Medications   sodium chloride 0.9 % flush 10 mL (has no administration in time range)   sodium chloride 0.9 % flush 10 mL (has no administration in time range)   iopamidol (ISOVUE-370) 76 % injection 100 mL (40 mL Intravenous Given 2/3/25 1401)   iopamidol (ISOVUE-370) 76 % injection 100 mL (100 mL Intravenous Given 2/3/25 1402)        ED Course:    The patient was initially evaluated in the triage area where orders were placed. The patient was later dispositioned by Jeb Luciano MD.      The patient was advised to stay for completion of workup which includes but is not limited to communication of labs and radiological results, reassessment and plan. The patient was advised that leaving prior to disposition by a provider could result in critical findings that are not communicated to the patient.     ED Course as of 02/03/25 1624   Mon Feb 03, 2025   1238 PROVIDER IN TRIAGE  Patient was evaluated by me in triage, Rishabh Dominique PA-C.  Orders were placed and patient is currently awaiting final results and disposition.  [MD]      ED Course User Index  [MD] Rishabh Dominique PA-C       Labs:    Lab Results (last 24 hours)       Procedure Component Value Units Date/Time    POC Glucose Once [[id number]]  (Normal) Collected: 02/03/25 1324    Specimen: Blood Updated: 02/03/25 1325     Glucose 74 mg/dL      Comment: Serial Number: [id number]:  [id number]       CBC & Differential [[id number]]  (Abnormal) Collected: 02/03/25 1342    Specimen: Blood Updated: 02/03/25 1351    Narrative:      The following orders were created for panel order CBC & Differential.  Procedure                               Abnormality         Status                     ---------                               -----------         ------                     CBC Auto Differential[[id number]]        Abnormal            Final result                 Please view results for these  tests on the individual orders.    Comprehensive Metabolic Panel [[id number]]  (Abnormal) Collected: 02/03/25 1342    Specimen: Blood Updated: 02/03/25 1415     Glucose 71 mg/dL      BUN 11 mg/dL      Creatinine 1.15 mg/dL      Sodium 140 mmol/L      Potassium 4.4 mmol/L      Chloride 100 mmol/L      CO2 31.0 mmol/L      Calcium 10.0 mg/dL      Total Protein 7.3 g/dL      Albumin 4.0 g/dL      ALT (SGPT) 12 U/L      AST (SGOT) 19 U/L      Alkaline Phosphatase 99 U/L      Total Bilirubin 0.2 mg/dL      Globulin 3.3 gm/dL      A/G Ratio 1.2 g/dL      BUN/Creatinine Ratio 9.6     Anion Gap 9.0 mmol/L      eGFR 53.0 mL/min/1.73     Narrative:      GFR Categories in Chronic Kidney Disease (CKD)      GFR Category          GFR (mL/min/1.73)    Interpretation  G1                     90 or greater         Normal or high (1)  G2                      60-89                Mild decrease (1)  G3a                   45-59                Mild to moderate decrease  G3b                   30-44                Moderate to severe decrease  G4                    15-29                Severe decrease  G5                    14 or less           Kidney failure          (1)In the absence of evidence of kidney disease, neither GFR category G1 or G2 fulfill the criteria for CKD.    eGFR calculation 2021 CKD-EPI creatinine equation, which does not include race as a factor    High Sensitivity Troponin T [[id number]]  (Normal) Collected: 02/03/25 1342    Specimen: Blood Updated: 02/03/25 1415     HS Troponin T 8 ng/L     Narrative:      High Sensitive Troponin T Reference Range:  <14.0 ng/L- Negative Female for AMI  <22.0 ng/L- Negative Male for AMI  >=14 - Abnormal Female indicating possible myocardial injury.  >=22 - Abnormal Male indicating possible myocardial injury.   Clinicians would have to utilize clinical acumen, EKG, Troponin, and serial changes to determine if it is an Acute Myocardial Infarction or myocardial injury due to an underlying  chronic condition.         Magnesium [[id number]]  (Normal) Collected: 02/03/25 1342    Specimen: Blood Updated: 02/03/25 1415     Magnesium 2.0 mg/dL     CBC Auto Differential [[id number]]  (Abnormal) Collected: 02/03/25 1342    Specimen: Blood Updated: 02/03/25 1351     WBC 7.19 10*3/mm3      RBC 4.52 10*6/mm3      Hemoglobin 13.5 g/dL      Hematocrit 41.9 %      MCV 92.7 fL      MCH 29.9 pg      MCHC 32.2 g/dL      RDW 12.9 %      RDW-SD 43.6 fl      MPV 9.7 fL      Platelets 200 10*3/mm3      Neutrophil % 76.5 %      Lymphocyte % 12.4 %      Monocyte % 6.8 %      Eosinophil % 3.6 %      Basophil % 0.6 %      Immature Grans % 0.1 %      Neutrophils, Absolute 5.50 10*3/mm3      Lymphocytes, Absolute 0.89 10*3/mm3      Monocytes, Absolute 0.49 10*3/mm3      Eosinophils, Absolute 0.26 10*3/mm3      Basophils, Absolute 0.04 10*3/mm3      Immature Grans, Absolute 0.01 10*3/mm3      nRBC 0.0 /100 WBC     Protime-INR [[id number]]  (Normal) Collected: 02/03/25 1342    Specimen: Blood Updated: 02/03/25 1410     Protime 12.7 Seconds      INR 0.94    Narrative:      Suggested Therapeutic Ranges For Oral Anticoagulant Therapy:  Level of Therapy                      INR Target Range  Standard Dose                            2.0-3.0  High Dose                                2.5-3.5  Patients not receiving anticoagulant  Therapy Normal Range                     0.86-1.15    aPTT [[id number]]  (Normal) Collected: 02/03/25 1342    Specimen: Blood Updated: 02/03/25 1410     PTT 32.6 seconds     Urinalysis With Microscopic If Indicated (No Culture) - Urine, Clean Catch [[id number]]  (Abnormal) Collected: 02/03/25 1439    Specimen: Urine, Clean Catch Updated: 02/03/25 1522     Color, UA Yellow     Appearance, UA Clear     pH, UA 7.0     Specific Gravity, UA >1.030     Glucose, UA Negative     Ketones, UA Negative     Bilirubin, UA Negative     Blood, UA Negative     Protein, UA Negative     Leuk Esterase, UA Trace     Nitrite, UA  Negative     Urobilinogen, UA 0.2 E.U./dL    Urinalysis, Microscopic Only - Urine, Clean Catch [[id number]]  (Abnormal) Collected: 02/03/25 1439    Specimen: Urine, Clean Catch Updated: 02/03/25 1522     RBC, UA 3-5 /HPF      WBC, UA 0-2 /HPF      Bacteria, UA None Seen /HPF      Squamous Epithelial Cells, UA 0-2 /HPF      Hyaline Casts, UA None Seen /LPF      Methodology Automated Microscopy    High Sensitivity Troponin T 1Hr [[id number]] Collected: 02/03/25 1555    Specimen: Blood from Arm, Left Updated: 02/03/25 1558             Imaging:    CT Angiogram Head w AI Analysis of LVO    Result Date: 2/3/2025  CT ANGIOGRAM NECK, CT ANGIOGRAM HEAD W AI ANALYSIS OF LVO Date of Exam: 2/3/2025 2:08 PM EST Indication: Neuro Deficit, acute, Stroke suspected Neuro deficit, acute, stroke suspected. Comparison: CT head from earlier today and CT angiography head and neck from June 19, 2019 Technique: CTA of the head and neck was performed before and after the uneventful intravenous administration of iodinated contrast. Reconstructed coronal and sagittal images were also obtained. In addition, a 3-D volume rendered image was created for interpretation. Automated exposure control and iterative reconstruction methods were used. Findings: There is a three-vessel aortic arch. The right common carotid artery is widely patent with mild plaque distally. The right carotid bifurcation is widely patent. The right internal carotid artery is widely patent. The left common carotid is patent with mild stenosis distally. There is mild plaque at the left carotid bifurcation that is not hemodynamically significant. The left internal carotid artery is widely patent. The right vertebral artery is widely patent. There is mild stenosis along the proximal left subclavian artery. The left vertebral artery is widely patent. The vertebral arteries are codominant. The bilateral middle cerebral, bilateral anterior cerebral, and anterior communicating  arteries are widely patent. The basilar and bilateral posterior cerebral arteries are widely patent. There are patent bilateral posterior communicating arteries. No intracranial aneurysm is identified. The visualized portions of the bilateral superior cerebellar, anterior-inferior cerebellar, and posterior inferior cerebellar arteries are unremarkable.     Impression: Major arterial vasculature within head and neck appears widely patent, with no hemodynamically significant stenosis, dissection, thrombus, or aneurysm. Electronically Signed: Delvis Zeng MD  2/3/2025 2:48 PM EST  Workstation ID: [id number]    CT Angiogram Neck    Result Date: 2/3/2025  CT ANGIOGRAM NECK, CT ANGIOGRAM HEAD W AI ANALYSIS OF LVO Date of Exam: 2/3/2025 2:08 PM EST Indication: Neuro Deficit, acute, Stroke suspected Neuro deficit, acute, stroke suspected. Comparison: CT head from earlier today and CT angiography head and neck from June 19, 2019 Technique: CTA of the head and neck was performed before and after the uneventful intravenous administration of iodinated contrast. Reconstructed coronal and sagittal images were also obtained. In addition, a 3-D volume rendered image was created for interpretation. Automated exposure control and iterative reconstruction methods were used. Findings: There is a three-vessel aortic arch. The right common carotid artery is widely patent with mild plaque distally. The right carotid bifurcation is widely patent. The right internal carotid artery is widely patent. The left common carotid is patent with mild stenosis distally. There is mild plaque at the left carotid bifurcation that is not hemodynamically significant. The left internal carotid artery is widely patent. The right vertebral artery is widely patent. There is mild stenosis along the proximal left subclavian artery. The left vertebral artery is widely patent. The vertebral arteries are codominant. The bilateral middle cerebral, bilateral  anterior cerebral, and anterior communicating arteries are widely patent. The basilar and bilateral posterior cerebral arteries are widely patent. There are patent bilateral posterior communicating arteries. No intracranial aneurysm is identified. The visualized portions of the bilateral superior cerebellar, anterior-inferior cerebellar, and posterior inferior cerebellar arteries are unremarkable.     Impression: Major arterial vasculature within head and neck appears widely patent, with no hemodynamically significant stenosis, dissection, thrombus, or aneurysm. Electronically Signed: Delvis Zeng MD  2/3/2025 2:48 PM EST  Workstation ID: [id number]    CT CEREBRAL PERFUSION WITH & WITHOUT CONTRAST    Result Date: 2/3/2025  CT CEREBRAL PERFUSION W WO CONTRAST Date of Exam: 2/3/2025 1:41 PM EST Indication: Neuro Deficit, acute, Stroke suspected Neuro deficit, acute, stroke suspected.  Comparison: Noncontrast head CT from today Technique: Axial CT images of the brain were obtained prior to and after the uneventful intravenous administration of iodinated contrast. Core blood volume, core blood flow, mean transit time, and Tmax images were obtained utilizing the Rapid software protocol. A limited CT angiogram of the head was also performed to measure the blood vessel density. The radiation dose reduction device was turned on for each scan per the ALARA (As Low as Reasonably Achievable) protocol. FINDINGS: CT Perfusion: CBF (<30%) volume: 0 mL Tmax (>6.0s) volume: 0 mL Mismatch volume: 0 mL Mismatch ratio: None     No CT perfusion findings to suggest territorial ischemia or core infarct. Electronically Signed: Alexander Gilbert MD  2/3/2025 2:11 PM EST  Workstation ID: [id number]    XR Ribs Right With PA Chest    Result Date: 2/3/2025  XR RIBS RIGHT W PA CHEST Date of Exam: 2/3/2025 1:21 PM EST Indication: Right rib pain since fall Comparison: None available. Findings: Left perihilar cavitary lesion is similar compared to  prior exam. Lungs are otherwise clear. Old right posterior rib fracture. No acute displaced rib fractures. Cardiac mediastinal contours are within normal limits.     Impression: 1.No acute displaced rib fractures. 2.Stable left perihilar cavitary lesion. Electronically Signed: Sherif Calixto MD  2/3/2025 1:27 PM EST  Workstation ID: [id number]    CT Head Without Contrast    Result Date: 2/3/2025  CT HEAD WO CONTRAST Date of Exam: 2/3/2025 1:02 PM EST Indication: dizziness. Comparison: MRI brain from September 21, 2022 and CT head from June 17, 2019 Technique: Axial CT images were obtained of the head without contrast administration.  Reconstructed coronal and sagittal images were also obtained. Automated exposure control and iterative construction methods were used. Findings: No acute intracranial hemorrhage or extra-axial collection is identified. The ventricles appear normal in caliber, with no evidence of mass effect or midline shift. The basal cisterns appear patent. The gray-white differentiation appears preserved. The calvarium appear intact. The paranasal sinuses are clear. The mastoid air cells are well-aerated.     Impression: No acute intracranial process identified. Electronically Signed: Delvis Zeng MD  2/3/2025 1:20 PM EST  Workstation ID: [id number]       Differential Diagnosis and Discussion:      Stroke: Differential diagnosis in this patient with signs of possible ischemic stroke include TIA or ischemic stroke, hemorrhagic stroke, hypoglycemic episode, toxic or metabolic encephalopathy, paresthesias.    PROCEDURES:    Labs were collected in the emergency department and all labs were reviewed and interpreted by me.  X-ray were performed in the emergency department and all X-ray impressions were independently interpreted by me.  An EKG was performed and the EKG was interpreted by me.  CT scan was performed in the emergency department and the CT scan radiology impression was interpreted by me.    ECG  12 Lead ED Triage Standing Order; Weak / Dizzy / AMS   Preliminary Result   HEART RATE=69  bpm   RR Ehqyaszv=639  ms   NM Interval=160  ms   P Horizontal Axis=33  deg   P Front Axis=-3  deg   QRSD Interval=81  ms   QT Ttedzlwt=388  ms   KWqQ=793  ms   QRS Axis=59  deg   T Wave Axis=133  deg   - ABNORMAL ECG -   Sinus rhythm   Abnrm T, consider ischemia, anterolateral lds   When compared with ECG of 26-Apr-2020 19:28:59,   Significant change in rhythm: previously ectopic atrial   New or worsened ischemia or infarction   Date and Time of Study:2025-02-03 14:33:43         My interpretation of EKG shows sinus rhythm, normal rate, normal QT, no acute ischemia    Procedures    MDM  Number of Diagnoses or Management Options  Left-sided weakness  Diagnosis management comments: In summary this is a 64-year-old female with a remote history of CVA with mild residual left-sided deficits who presents today for evaluation of increased left-sided weakness.  CT brain without contrast reviewed by me is unremarkable for acute pathology.  CTA head and neck reviewed by me is unremarkable for acute pathology.  CBC independently reviewed and interpreted by me and shows no critical abnormalities.  CMP independently reviewed and interpreted by me and shows no critical abnormalities.  Teleneurology has seen and evaluate the patient and made recommendations for admission for further stroke workup including MRI.  Patient case has been discussed with the hospitalist team who will admit to the hospital for further evaluation and continuation of treatment.       Amount and/or Complexity of Data Reviewed  Clinical lab tests: reviewed  Tests in the radiology section of CPT®: reviewed  Tests in the medicine section of CPT®: reviewed           The patient presents with symptoms concerning for a stroke. The patient was evaluated by me immediately upon arrival. Extensive history and physical was obtained. Family was present to give further insight  into patients onset of symptoms.  CT scan findings were discussed with radiology. The case was also discussed at length with telehealth neurology. Nursing staff was instructed on how to proceed with patient care. Patient was then placed on the cardiac monitor and monitored for arrhythmia that could be contributing to stroke like symptoms. EKG was performed and evaluated by me. Patient's blood pressure was monitored and controled consistent with stroke guidelines. Treatment option's for patient's symptoms include TNKase however after inclusion and exclusion criteria was weighed patient is not a candidate due to being outside the window. The patient's mental status and neurological symptoms were monitored throughout their stay for worsening decline.     Total Critical Care time of 35 minutes. Total critical care time documented does not include time spent on separately billed procedures for services of nurses or physician assistants. I personally saw and examined the patient. I have reviewed all diagnostic interpretations and treatment plans as written. I was present for the key portions of any procedures performed and the inclusive time noted in any critical care statement. Critical care time includes patient management by me, time spent at the patients bedside,  time to review lab and imaging results, discussing patient care, documentation in the medical record, and time spent with family or caregiver.          Patient Care Considerations:    MRI: I considered ordering an MRI however this can be accomplished inpatient      Consultants/Shared Management Plan:    Hospitalist: I have discussed the case with Dr. Stringer who agrees to accept the patient for admission.  Consultant: I have discussed the case with teleneurology who agrees to consult on the patient.    Social Determinants of Health:    Patient is independent, reliable, and has access to care.       Disposition and Care Coordination:    Admit:   Through  independent evaluation of the patient's history, physical, and imperical data, the patient meets criteria for inpatient admission to the hospital.        Final diagnoses:   Left-sided weakness        ED Disposition       ED Disposition   Decision to Admit    Condition   --    Comment   Level of Care: Telemetry [5]   Diagnosis: Ischemic stroke [[id number]]                 This medical record created using voice recognition software.             Jeb Luciano MD  02/03/25 0019

## 2025-02-03 NOTE — CONSULTS
[facility]  [facility]      Patient Name:   Gabriella Jha  YOB: 1960  Identification Number:   MRN - [id number]  Date of Service:   02/03/2025 13:27:27    Diagnosis:        I63.89 - Cerebrovascular accident (CVA) due to other mechanism ([facility])        W19.XXXA - Fall    Impression:   Pt is a 64 YOF (right handed) with PMH of HTN, HLD, CHF, ANXIETY D/O, LUNG CA, MIGRIANES, prior CVA with left sided weakness who presented with fall, slurred speech, unsteady gait. NIHSS: 4. Deferred thrombolytics. Prelim CTAs negative for LVO. Admit for TIA/STROKE vs TOXIC/METABOLIC/INFECTIOUS process.      Monitor neuro checks/VS q4h with telemetry.   Recommend fall precautions and seizure precautions.   Goal SBP b/w 160-180 today, 140-160 -> 100-140 afterwards.   Cont. PLAVIX + STATIN if no contraindications.   Get MRI BRAIN W/O and ECHO.   Get BLOOD CX x2, COVID, UDS, ETOH, ESR/CRP, TROP, CK, TSH, B12, BNP, LACTIC ACID, LIPID PANEL, and A1C.   Get WORKUP for TOXIC/METABOLIC/INFECTIOUS causes.   PT/OT/ST eval.   STOP TOBACCO USE    Our recommendations are outlined below.    Recommendations:          Stroke/Telemetry Floor        Neuro Checks        Bedside Swallow Eval        DVT Prophylaxis        IV Fluids, Normal Saline        Head of Bed 30 Degrees        Euglycemia and Avoid Hyperthermia (PRN Acetaminophen)    Sign Out:        Discussed with Emergency Department Provider        ------------------------------------------------------------------------------    Advanced Imaging:  CTA Head and Neck Completed.    CTP Completed.    LVO:No    Patient in not a candidate for ISA      Metrics:  Last Known Well: 02/02/2025 22:00:00  Dispatch Time: 02/03/2025 13:26:31  Arrival Time: 02/03/2025 11:36:00  Initial Response Time: 02/03/2025 13:28:16  Symptoms: fall, slurred speech, unsteady gait.  Initial patient interaction: 02/03/2025 13:33:23  NIHSS Assessment Completed: 02/03/2025  13:40:52  Patient is not a candidate for Thrombolytic.  Thrombolytic Medical Decision: 02/03/2025 13:41:00  Patient was not deemed candidate for Thrombolytic because of following reasons:  LKW outside 4.5 hr window. .    CT head showed no acute hemorrhage or acute core infarct.    CTA: prelim negative for LVO. Major arterial vasculature within head and neck appears widely patent, with no hemodynamically significant stenosis, dissection, thrombus, or aneurysm.     CTP: No CT perfusion findings to suggest territorial ischemia or core infarct.     Primary Provider Notified of Diagnostic Impression and Management Plan on: 02/03/2025 14:02:41        ------------------------------------------------------------------------------    History of Present Illness:  Patient is a 64 year old Female.    Patient was brought by private transportation with symptoms of fall, slurred speech, unsteady gait.  Pt is a 64 YOF (right handed) with PMH of HTN, HLD, CHF, ANXIETY D/O, LUNG CA, MIGRIANES, prior CVA with left sided weakness who presented with fall, slurred speech, unsteady gait. She was last normal last night at 2200 prior to bed and woke up and had fall at some point in night, LOC unclear. She denied any head trauma, no BI/UI, no radicular neck/back pain.      Past Medical History:       Hypertension       Hyperlipidemia       Stroke    Medications:    No Anticoagulant use   Antiplatelet use: Yes PLAVIX  Reviewed EMR for current medications    Allergies:   Reviewed    Social History:  Smoking: Yes  Alcohol Use: No  Drug Use: No    Family History:    There is no family history of premature cerebrovascular disease pertinent to this consultation    ROS :  14 Points Review of Systems was performed and was negative except mentioned in HPI.    Past Surgical History:  There Is No Surgical History Contributory To Today’s Visit        Examination:  BP(111/71), Pulse(85), Blood Glucose(74)  1A: Level of Consciousness - Alert; keenly  responsive + 0  1B: Ask Month and Age - Both Questions Right + 0  1C: Blink Eyes & Squeeze Hands - Performs Both Tasks + 0  2: Test Horizontal Extraocular Movements - Normal + 0  3: Test Visual Fields - No Visual Loss + 0  4: Test Facial Palsy (Use Grimace if Obtunded) - Minor paralysis (flat nasolabial fold, smile asymmetry) + 1  5A: Test Left Arm Motor Drift - No Drift for 10 Seconds + 0  5B: Test Right Arm Motor Drift - No Drift for 10 Seconds + 0  6A: Test Left Leg Motor Drift - Drift, but doesn't hit bed + 1  6B: Test Right Leg Motor Drift - No Drift for 5 Seconds + 0  7: Test Limb Ataxia (FNF/Heel-Shin) - No Ataxia + 0  8: Test Sensation - Normal; No sensory loss + 0  9: Test Language/Aphasia - Mild-Moderate Aphasia: Some Obvious Changes, Without Significant Limitation + 1  10: Test Dysarthria - Mild-Moderate Dysarthria: Slurring but can be understood + 1  11: Test Extinction/Inattention - No abnormality + 0    NIHSS Score: 4    NIHSS Free Text : Age/Month/Year: [birth year]. She had chronic left face/leg weakness. She had left leg sensation loss. She couldn't name watch.    Pre-Morbid Modified [place] Scale:  2 Points = Slight disability; unable to carry out all previous activities, but able to look after own affairs without assistance    Spoke with : ED  I reviewed the available imaging via Rapid and initiated discussion with the primary provider    This consult was conducted in real time using interactive audio and video technology. Patient was informed of the technology being used for this visit and agreed to proceed. Patient located in hospital and provider located at home/office setting.      Patient is being evaluated for possible acute neurologic impairment and high probability of imminent or life-threatening deterioration. I spent total of 34 minutes providing care to this patient, including time for face to face visit via telemedicine, review of medical records, imaging studies and discussion of findings  with providers, the patient and/or family.      Dr Araseli Dhillon      [facility]  For Inpatient follow-up with [facility] physician please call [facility] at [phone]. As we are not an outpatient service for any post hospital discharge needs please contact the hospital for assistance.  If you have any questions for the [facility] physicians or need to reconsult for clinical or diagnostic changes please contact us via [facility] at [phone].

## 2025-02-03 NOTE — PLAN OF CARE
Goal Outcome Evaluation:  Plan of Care Reviewed With: patient, significant other        Progress: improving  Outcome Evaluation: VSS. NIH 3. Dysphagia screen passed.

## 2025-02-03 NOTE — H&P
[facility]  Date: 2/3/2025   Patient Name: Gabriella Jha  : 1960  MRN: [id number]  Primary Care Physician:  Carolina Parra APRN  Date of admission: 2/3/2025    Subjective   Subjective     Chief Complaint: Left-sided weakness and fall    HPI:    Gabriella Jha is a 64 y.o. female past history of COPD, stroke with mild left-sided deficits, hypertension, dyslipidemia, depression/anxiety, and vitamin D deficiency who presents with left-sided weakness and fall    The patient is very sleepy on exam.  She is not on oxygen at home but is currently on oxygen.  She states that several times over the last several months she will have a fall due to weakness on her left side.  She states today that the left-sided weakness she had from her previous stroke is gotten much worse.  She had a pretty significant fall and as result she came to the ER for further evaluation.      In the emergency department the vital signs are vital signs are as follows: Temperature 98.4, pulse 72, respiratory 17, blood pressure 103/69, patient was 87% on room air currently 93% on 2 L.  CBC shows no abnormalities.  CMP shows a creatinine of 1.15.  Urinalysis shows no abnormalities.  The head neck showed no LVO.  CT head shows no acute abnormality.  The rib shows a stable left perihilar cavitary lesion.  Patient will be admitted to the hospital for further management of recrudescence of old stroke versus new stroke.    All systems reviewed abnormal as noted above    Personal History     Past Medical History:  COPD  Anxiety/depression  Migraine headache  Heart failure preserved ejection fraction    Past Surgical History:    Hysterectomy    Family History:   No known family history    Social History:   Every day smoker.    Home Medications:  ARIPiprazole, DULoxetine HCl, Ferrous Sulfate, Vitamin D3, albuterol sulfate HFA, buPROPion SR, clonazePAM, clopidogrel, gabapentin, guaiFENesin, losartan,  rosuvastatin, tiotropium bromide-olodaterol, and venlafaxine XR    Allergies:  Allergies   Allergen Reactions    Clindamycin Hcl Rash         Objective   Objective     Vitals:   Temp:  [98.4 °F (36.9 °C)-98.7 °F (37.1 °C)] 98.4 °F (36.9 °C)  Heart Rate:  [72-85] 72  Resp:  [16-18] 17  BP: (103-121)/(69-75) 103/69  Flow (L/min) (Oxygen Therapy):  [2-3] 2    Physical Exam    Constitutional: Sleepy but will wake up to questions.   Eyes: Pupils equal, sclerae anicteric, no conjunctival injection   HENT: NCAT, mucous membranes moist   Neck: Supple, no thyromegaly, no lymphadenopathy, trachea midline   Respiratory: Clear to auscultation bilaterally, nonlabored respirations    Cardiovascular: RRR, no murmurs, rubs, or gallops, palpable pedal pulses bilaterally   Gastrointestinal: Positive bowel sounds, soft, nontender, nondistended   Musculoskeletal: No bilateral ankle edema, no clubbing or cyanosis to extremities   Psychiatric: Appropriate affect, cooperative   Neurologic: Patient is very sleepy on exam.  Left hand strength is 3/5 in left hip extension is 3/5 as well.   Skin: No rashes     Result Review    Result Review:  I have personally reviewed the results from the time of this admission to 2/3/2025 15:28 EST and agree with these findings:  Imaging and labs reviewed    Assessment & Plan   Assessment / Plan     Assessment/Plan:   Acute ischemic stroke versus recrudescence of old stroke  Acute hypoxic respiratory failure  Polypharmacy with Abilify, duloxetine, clonazepam, gabapentin  History of old stroke with left-sided weakness  COPD without exacerbation    Plan:  --Admit to hospital service  -- Consult teleneurology  -- CTA showed no LVO or significant stenosis  -- MRI of the brain is pending  -- Echocardiogram  -- Permissive hypertension  -- SLP/OT/PT  -- Aspirin and atorvastatin  -- Lipid panel and A1c  -- Patient was also hypoxemic but she was very sleepy so I assume this was just due to decreased drive  potentially from medications  -- Check UDS  -- Low threshold for ABG  -- Brovana/Pulmicort/DuoNebs      VTE Prophylaxis:  SCDs        CODE STATUS:     Full code    Admission Status:  I believe this patient meets observation status.    Electronically signed by Kaiden Stringer MD, 02/03/25, 3:28 PM EST.

## 2025-02-03 NOTE — ED NOTES
Pt reports she went to sleep at approx 2200 last night, woke up at approx 0630 with slurred speech, difficulty speaking, and mobility impairment. Boyfriend at bedside reports patient fell sometime between last night and this morning. Left sided facial droop noted pts baseline, previous stroke [id number].

## 2025-02-04 ENCOUNTER — APPOINTMENT (OUTPATIENT)
Dept: GENERAL RADIOLOGY | Facility: HOSPITAL | Age: 65
End: 2025-02-04
Payer: MEDICARE

## 2025-02-04 ENCOUNTER — READMISSION MANAGEMENT (OUTPATIENT)
Dept: CALL CENTER | Facility: HOSPITAL | Age: 65
End: 2025-02-04
Payer: MEDICARE

## 2025-02-04 VITALS
TEMPERATURE: 98.1 F | RESPIRATION RATE: 18 BRPM | BODY MASS INDEX: 23.64 KG/M2 | SYSTOLIC BLOOD PRESSURE: 124 MMHG | DIASTOLIC BLOOD PRESSURE: 68 MMHG | HEIGHT: 61 IN | OXYGEN SATURATION: 92 % | HEART RATE: 77 BPM | WEIGHT: 125.22 LBS

## 2025-02-04 LAB
ALBUMIN SERPL-MCNC: 3.6 G/DL (ref 3.5–5.2)
ALBUMIN/GLOB SERPL: 1.2 G/DL
ALP SERPL-CCNC: 90 U/L (ref 39–117)
ALT SERPL W P-5'-P-CCNC: 9 U/L (ref 1–33)
ANION GAP SERPL CALCULATED.3IONS-SCNC: 7.8 MMOL/L (ref 5–15)
AST SERPL-CCNC: 16 U/L (ref 1–32)
AV MEAN PRESS GRAD SYS DOP V1V2: 2 MMHG
AV VMAX SYS DOP: 106 CM/SEC
BASOPHILS # BLD AUTO: 0.03 10*3/MM3 (ref 0–0.2)
BASOPHILS NFR BLD AUTO: 0.5 % (ref 0–1.5)
BH CV ECHO MEAS - AO MAX PG: 4.5 MMHG
BH CV ECHO MEAS - AO ROOT DIAM: 2.6 CM
BH CV ECHO MEAS - AO V2 VTI: 19.9 CM
BH CV ECHO MEAS - AVA(I,D): 2.6 CM2
BH CV ECHO MEAS - EDV(CUBED): 32.8 ML
BH CV ECHO MEAS - EDV(MOD-SP2): 23.8 ML
BH CV ECHO MEAS - EDV(MOD-SP4): 32.1 ML
BH CV ECHO MEAS - EF(MOD-SP2): 62.7 %
BH CV ECHO MEAS - EF(MOD-SP4): 53.6 %
BH CV ECHO MEAS - ESV(CUBED): 10.6 ML
BH CV ECHO MEAS - ESV(MOD-SP2): 8.9 ML
BH CV ECHO MEAS - ESV(MOD-SP4): 14.9 ML
BH CV ECHO MEAS - FS: 31.3 %
BH CV ECHO MEAS - IVS/LVPW: 1 CM
BH CV ECHO MEAS - IVSD: 1.1 CM
BH CV ECHO MEAS - LA DIMENSION: 2.1 CM
BH CV ECHO MEAS - LAT PEAK E' VEL: 7.9 CM/SEC
BH CV ECHO MEAS - LV MASS(C)D: 104.3 GRAMS
BH CV ECHO MEAS - LV MAX PG: 3.8 MMHG
BH CV ECHO MEAS - LV MEAN PG: 2 MMHG
BH CV ECHO MEAS - LV V1 MAX: 96.9 CM/SEC
BH CV ECHO MEAS - LV V1 VTI: 16.5 CM
BH CV ECHO MEAS - LVIDD: 3.2 CM
BH CV ECHO MEAS - LVIDS: 2.2 CM
BH CV ECHO MEAS - LVOT AREA: 3.1 CM2
BH CV ECHO MEAS - LVOT DIAM: 2 CM
BH CV ECHO MEAS - LVPWD: 1.1 CM
BH CV ECHO MEAS - MED PEAK E' VEL: 7.3 CM/SEC
BH CV ECHO MEAS - MV A MAX VEL: 71.3 CM/SEC
BH CV ECHO MEAS - MV DEC SLOPE: 198 CM/SEC2
BH CV ECHO MEAS - MV DEC TIME: 0.3 SEC
BH CV ECHO MEAS - MV E MAX VEL: 59.2 CM/SEC
BH CV ECHO MEAS - MV E/A: 0.83
BH CV ECHO MEAS - RVDD: 2.5 CM
BH CV ECHO MEAS - SV(LVOT): 51.8 ML
BH CV ECHO MEAS - SV(MOD-SP2): 14.9 ML
BH CV ECHO MEAS - SV(MOD-SP4): 17.2 ML
BH CV ECHO MEASUREMENTS AVERAGE E/E' RATIO: 7.79
BH CV ECHO SHUNT ASSESSMENT PERFORMED (HIDDEN SCRIPTING): 1
BILIRUB SERPL-MCNC: 0.2 MG/DL (ref 0–1.2)
BUN SERPL-MCNC: 15 MG/DL (ref 8–23)
BUN/CREAT SERPL: 10.9 (ref 7–25)
CALCIUM SPEC-SCNC: 9.1 MG/DL (ref 8.6–10.5)
CHLORIDE SERPL-SCNC: 102 MMOL/L (ref 98–107)
CHOLEST SERPL-MCNC: 100 MG/DL (ref 0–200)
CO2 SERPL-SCNC: 31.2 MMOL/L (ref 22–29)
CREAT SERPL-MCNC: 1.38 MG/DL (ref 0.57–1)
DEPRECATED RDW RBC AUTO: 44.8 FL (ref 37–54)
EGFRCR SERPLBLD CKD-EPI 2021: 42.6 ML/MIN/1.73
EOSINOPHIL # BLD AUTO: 0.27 10*3/MM3 (ref 0–0.4)
EOSINOPHIL NFR BLD AUTO: 4.5 % (ref 0.3–6.2)
ERYTHROCYTE [DISTWIDTH] IN BLOOD BY AUTOMATED COUNT: 12.7 % (ref 12.3–15.4)
GLOBULIN UR ELPH-MCNC: 3.1 GM/DL
GLUCOSE BLDC GLUCOMTR-MCNC: 80 MG/DL (ref 70–99)
GLUCOSE SERPL-MCNC: 72 MG/DL (ref 65–99)
HBA1C MFR BLD: 5.2 % (ref 4.8–5.6)
HCT VFR BLD AUTO: 44.7 % (ref 34–46.6)
HDLC SERPL-MCNC: 41 MG/DL (ref 40–60)
HGB BLD-MCNC: 13.9 G/DL (ref 12–15.9)
IMM GRANULOCYTES # BLD AUTO: 0.02 10*3/MM3 (ref 0–0.05)
IMM GRANULOCYTES NFR BLD AUTO: 0.3 % (ref 0–0.5)
LDLC SERPL CALC-MCNC: 39 MG/DL (ref 0–100)
LDLC/HDLC SERPL: 0.91 {RATIO}
LV EF BIPLANE MOD: 58.9 %
LYMPHOCYTES # BLD AUTO: 0.77 10*3/MM3 (ref 0.7–3.1)
LYMPHOCYTES NFR BLD AUTO: 12.8 % (ref 19.6–45.3)
MAGNESIUM SERPL-MCNC: 2.2 MG/DL (ref 1.6–2.4)
MCH RBC QN AUTO: 29.8 PG (ref 26.6–33)
MCHC RBC AUTO-ENTMCNC: 31.1 G/DL (ref 31.5–35.7)
MCV RBC AUTO: 95.7 FL (ref 79–97)
MONOCYTES # BLD AUTO: 0.57 10*3/MM3 (ref 0.1–0.9)
MONOCYTES NFR BLD AUTO: 9.5 % (ref 5–12)
NEUTROPHILS NFR BLD AUTO: 4.34 10*3/MM3 (ref 1.7–7)
NEUTROPHILS NFR BLD AUTO: 72.4 % (ref 42.7–76)
NRBC BLD AUTO-RTO: 0 /100 WBC (ref 0–0.2)
PLATELET # BLD AUTO: 181 10*3/MM3 (ref 140–450)
PMV BLD AUTO: 9.4 FL (ref 6–12)
POTASSIUM SERPL-SCNC: 5 MMOL/L (ref 3.5–5.2)
PROT SERPL-MCNC: 6.7 G/DL (ref 6–8.5)
RBC # BLD AUTO: 4.67 10*6/MM3 (ref 3.77–5.28)
SODIUM SERPL-SCNC: 141 MMOL/L (ref 136–145)
TRIGL SERPL-MCNC: 108 MG/DL (ref 0–150)
VLDLC SERPL-MCNC: 20 MG/DL (ref 5–40)
WBC NRBC COR # BLD AUTO: 6 10*3/MM3 (ref 3.4–10.8)

## 2025-02-04 PROCEDURE — 83036 HEMOGLOBIN GLYCOSYLATED A1C: CPT | Performed by: INTERNAL MEDICINE

## 2025-02-04 PROCEDURE — 92610 EVALUATE SWALLOWING FUNCTION: CPT

## 2025-02-04 PROCEDURE — 71045 X-RAY EXAM CHEST 1 VIEW: CPT

## 2025-02-04 PROCEDURE — 80053 COMPREHEN METABOLIC PANEL: CPT | Performed by: INTERNAL MEDICINE

## 2025-02-04 PROCEDURE — 94799 UNLISTED PULMONARY SVC/PX: CPT

## 2025-02-04 PROCEDURE — 82948 REAGENT STRIP/BLOOD GLUCOSE: CPT

## 2025-02-04 PROCEDURE — 83735 ASSAY OF MAGNESIUM: CPT | Performed by: INTERNAL MEDICINE

## 2025-02-04 PROCEDURE — G0378 HOSPITAL OBSERVATION PER HR: HCPCS

## 2025-02-04 PROCEDURE — 85025 COMPLETE CBC W/AUTO DIFF WBC: CPT | Performed by: INTERNAL MEDICINE

## 2025-02-04 PROCEDURE — 80061 LIPID PANEL: CPT | Performed by: INTERNAL MEDICINE

## 2025-02-04 PROCEDURE — 94618 PULMONARY STRESS TESTING: CPT

## 2025-02-04 PROCEDURE — 97166 OT EVAL MOD COMPLEX 45 MIN: CPT

## 2025-02-04 PROCEDURE — 97161 PT EVAL LOW COMPLEX 20 MIN: CPT

## 2025-02-04 PROCEDURE — 94664 DEMO&/EVAL PT USE INHALER: CPT

## 2025-02-04 RX ADMIN — ASPIRIN 81 MG CHEWABLE TABLET 81 MG: 81 TABLET CHEWABLE at 08:26

## 2025-02-04 RX ADMIN — BUDESONIDE 0.5 MG: 0.5 INHALANT RESPIRATORY (INHALATION) at 09:25

## 2025-02-04 RX ADMIN — Medication 10 ML: at 08:26

## 2025-02-04 RX ADMIN — ARFORMOTEROL TARTRATE 15 MCG: 15 SOLUTION RESPIRATORY (INHALATION) at 09:25

## 2025-02-04 NOTE — THERAPY EVALUATION
Acute Care - Physical Therapy Initial Evaluation   [facility]     Patient Name: Gabriella Jha  : 1960  MRN: [id number]  Today's Date: 2025      Visit Dx:     ICD-10-CM ICD-9-CM   1. Left-sided weakness  R53.1 728.87   2. Difficulty in walking  R26.2 719.7     Patient Active Problem List   Diagnosis    Abdominal hernia    Anxiety disorder    Cerebral infarction    Chronic obstructive pulmonary disease    Chronic tension-type headache, intractable    Depressive disorder    Diastolic congestive heart failure    Diverticulitis    Gastroesophageal reflux disease    Headache    Hyperlipidemia    Hypertension    Indeterminate colitis    Major depressive disorder with single episode    Methicillin susceptible Staphylococcus aureus infection    Other hereditary and idiopathic neuropathies    Peripheral neuropathic pain    Postmenopausal state    Requests hormone replacement therapy    Special screening for malignant neoplasms, colon    Lung cancer    Malignant neoplasm of upper lobe, right bronchus or lung    Encounter for screening colonoscopy    Hypoxia    Legionella pneumonia    Iron deficiency anemia    Ischemic stroke     Past Medical History:   Diagnosis Date    Anxiety     CHF (congestive heart failure)     COPD (chronic obstructive pulmonary disease)     GERD (gastroesophageal reflux disease)     Hyperlipemia     Hypertension     Lung cancer     Migraines     Stroke      Past Surgical History:   Procedure Laterality Date     SECTION      HYSTERECTOMY       PT Assessment (Last 12 Hours)       PT Evaluation and Treatment       Row Name 25 1100          Physical Therapy Time and Intention    Subjective Information no complaints  -ANTIONETTE     Document Type evaluation  -ANTIONETTE     Mode of Treatment individual therapy  -ANTIONETTE     Patient Effort good  -ANTIONETTE       Row Name 25 1100          General Information    Prior Level of Function independent:  -ANTIONETTE     Equipment Currently Used at Home none  -ANTIONETTE      Existing Precautions/Restrictions fall  -ANTIONETTE       Row Name 02/04/25 1100          Living Environment    Current Living Arrangements home  -ANTIONETTE     People in Home alone  -ANTIONETTE     Primary Care Provided by self;child(hazel)  -ANTIONETTE       Row Name 02/04/25 1100          Home Use of Assistive/Adaptive Equipment    Equipment Currently Used at Home none  -ANTIONETTE       Row Name 02/04/25 1100          Range of Motion (ROM)    Range of Motion ROM is WFL  -ANTIONETTE       [facility] Name 02/04/25 1100          Strength (Manual Muscle Testing)    Strength (Manual Muscle Testing) strength is WNL;bilateral lower extremities  except bilateral hip flexion 4/5  -ANTIONETTE       Row Name 02/04/25 1100          Bed Mobility    Bed Mobility bed mobility (all) activities  -ANTIONETTE     All Activities, [place] (Bed Mobility) independent  -ANTIONETTE       Row Name 02/04/25 1100          Transfers    Transfers sit-stand transfer  -ANTIONETTE       Row Name 02/04/25 1100          Sit-Stand Transfer    Sit-Stand [place] (Transfers) contact guard  -ANTIONETTE       [facility] Name 02/04/25 1100          Gait/Stairs (Locomotion)    Gait/Stairs Locomotion gait/ambulation independence  -ANTIONETTE     [place] Level (Gait) contact guard  -ANTIONETTE     Patient was able to Ambulate yes  -ANTIONETTE     Distance in Feet (Gait) 100  -ANTIONETTE       Row Name 02/04/25 1100          Balance    Balance Assessment standing dynamic balance  -ANTIONETTE     Dynamic Standing Balance contact guard  -ANTIONETTE       Row Name 02/04/25 1100          Plan of Care Review    Plan of Care Reviewed With patient  -ANTIONETTE     Outcome Evaluation pt has deficits with strength, balance, and safe ambulation. pt will benefit from skilled PT services  -ANTIONETTE       Row Name 02/04/25 1100          Therapy Assessment/Plan (PT)    Rehab Potential (PT) good  -ANTIONETTE     Criteria for Skilled Interventions Met (PT) skilled treatment is necessary  -ANTIONETTE     Therapy Frequency (PT) daily  -ANTIONETTE     Predicted Duration of Therapy Intervention (PT) 10 days  -ANTIONETTE     Problem List (PT) problems  related to;balance;mobility;strength  -ANTIONETTE     Activity Limitations Related to Problem List (PT) unable to ambulate safely  -ANTIONETTE       Row Name 02/04/25 1100          PT Evaluation Complexity    History, PT Evaluation Complexity no personal factors and/or comorbidities  -ANTIONETTE     Examination of Body Systems (PT Eval Complexity) total of 4 or more elements  -ANTIONETTE     Clinical Presentation (PT Evaluation Complexity) stable  -ANTIONETTE     Clinical Decision Making (PT Evaluation Complexity) low complexity  -ANTIONETTE     Overall Complexity (PT Evaluation Complexity) low complexity  -ANTIONETTE       Row Name 02/04/25 1100          Therapy Plan Review/Discharge Plan (PT)    Therapy Plan Review (PT) evaluation/treatment results reviewed;patient  -ANTIONETTE       Row Name 02/04/25 1100          Physical Therapy Goals    Transfer Goal Selection (PT) transfer, PT goal 1  -ANTIONETTE     Gait Training Goal Selection (PT) gait training, PT goal 1  -ANTIONETTE     Strength Goal Selection (PT) strength, PT goal 1  -ANTIONETTE     Balance Goal Selection (PT) balance, PT goal 1  -ANTIONETTE       Row Name 02/04/25 1100          Gait Training Goal 1 (PT)    Activity/Assistive Device (Gait Training Goal 1, PT) gait (walking locomotion);assistive device use  -ANTIONETET     [place] Level (Gait Training Goal 1, PT) independent  -ANTIONETTE     Distance (Gait Training Goal 1, PT) 300'  -ANTIONETTE     Time Frame (Gait Training Goal 1, PT) 10 days  -ANTIONETTE       Row Name 02/04/25 1100          Strength Goal 1 (PT)    Strength Goal 1 (PT) 5/5 bilateral hip flexion  -ANTIONETTE     Time Frame (Strength Goal 1, PT) 10 days  -ANTIONETTE       Row Name 02/04/25 1100          Balance Goal 1 (PT)    Activity/Assistive Device (Balance Goal) standing dynamic balance  -ANTIONETTE     [place] Level/Cues Needed (Balance Goal 1, PT) independent  -ANTIONETTE     Time Frame (Balance Goal 1, PT) 2 weeks  -ANTIONETTE               User Key  (r) = Recorded By, (t) = Taken By, (c) = Cosigned By      Initials Name Provider Type    Trung Holbrook, PT Physical Therapist                     Physical Therapy Education       Title: PT OT SLP Therapies (Done)       Topic: Physical Therapy (Done)       Point: Mobility training (Done)       Learning Progress Summary            Patient Acceptance, E,TB, VU by ANTIONETTE at 2/4/2025 1141                      Point: Home exercise program (Done)       Learning Progress Summary            Patient Acceptance, E,TB, VU by ANTIONETTE at 2/4/2025 1141                      Point: Body mechanics (Done)       Learning Progress Summary            Patient Acceptance, E,TB, VU by ANTIONETTE at 2/4/2025 1141                      Point: Precautions (Done)       Learning Progress Summary            Patient Acceptance, E,TB, VU by ANTIONETTE at 2/4/2025 1141                                      User Key       Initials Effective Dates Name Provider Type Discipline    ANTIONETTE 06/03/21 -  Trung Dominique, PT Physical Therapist PT                  PT Recommendation and Plan  Anticipated Discharge Disposition (PT): home with outpatient therapy services  Planned Therapy Interventions (PT): balance training, gait training, home exercise program, neuromuscular re-education, strengthening  Therapy Frequency (PT): daily  Plan of Care Reviewed With: patient  Outcome Evaluation: pt has deficits with strength, balance, and safe ambulation. pt will benefit from skilled PT services   Outcome Measures       Row Name 02/04/25 1100             How much help from another person do you currently need...    Turning from your back to your side while in flat bed without using bedrails? 4  -ANTIONETTE      Moving from lying on back to sitting on the side of a flat bed without bedrails? 4  -ANTIONETTE      Moving to and from a bed to a chair (including a wheelchair)? 4  -ANTIONETTE      Standing up from a chair using your arms (e.g., wheelchair, bedside chair)? 4  -ANTIONETTE      Climbing 3-5 steps with a railing? 3  -ANTIONETTE      To walk in hospital room? 3  -ANTIONETTE      AM-PAC 6 Clicks Score (PT) 22  -ANTIONETTE                User Key  (r) = Recorded By, (t) = Taken  By, (c) = Cosigned By      Initials Name Provider Type    Trung Holbrook, PT Physical Therapist                     Time Calculation:    PT Charges       Row Name 02/04/25 1131             Time Calculation    PT Received On 02/04/25  -ANTIONETTE      PT Goal Re-Cert Due Date 02/13/25  -ANTIONETTE         Untimed Charges    PT Eval/Re-eval Minutes 35  -ANTIONETTE         Total Minutes    Untimed Charges Total Minutes 35  -ANTIONETTE       Total Minutes 35  -ANTIONETTE                User Key  (r) = Recorded By, (t) = Taken By, (c) = Cosigned By      Initials Name Provider Type    Trung Holbrook, PT Physical Therapist                  Therapy Charges for Today       Code Description Service Date Service Provider Modifiers Qty    28132567282 HC PT EVAL LOW COMPLEXITY 3 2/4/2025 Trung Dominique, PT GP 1            PT G-Codes  AM-PAC 6 Clicks Score (PT): 22    Trung Dominique, PT  2/4/2025

## 2025-02-04 NOTE — PLAN OF CARE
Goal Outcome Evaluation:              Outcome Evaluation: No acute events overnight, VSS, NIH 3, patient completed MRI overnight. Patient did not voice any concerns.

## 2025-02-04 NOTE — THERAPY EVALUATION
Patient Name: Gabriella Jha  : 1960    MRN: [id number]                              Today's Date: 2025       Admit Date: 2/3/2025    Visit Dx:     ICD-10-CM ICD-9-CM   1. Left-sided weakness  R53.1 728.87   2. Difficulty in walking  R26.2 719.7     Patient Active Problem List   Diagnosis    Abdominal hernia    Anxiety disorder    Cerebral infarction    Chronic obstructive pulmonary disease    Chronic tension-type headache, intractable    Depressive disorder    Diastolic congestive heart failure    Diverticulitis    Gastroesophageal reflux disease    Headache    Hyperlipidemia    Hypertension    Indeterminate colitis    Major depressive disorder with single episode    Methicillin susceptible Staphylococcus aureus infection    Other hereditary and idiopathic neuropathies    Peripheral neuropathic pain    Postmenopausal state    Requests hormone replacement therapy    Special screening for malignant neoplasms, colon    Lung cancer    Malignant neoplasm of upper lobe, right bronchus or lung    Encounter for screening colonoscopy    Hypoxia    Legionella pneumonia    Iron deficiency anemia    Ischemic stroke     Past Medical History:   Diagnosis Date    Anxiety     CHF (congestive heart failure)     COPD (chronic obstructive pulmonary disease)     GERD (gastroesophageal reflux disease)     Hyperlipemia     Hypertension     Lung cancer     Migraines     Stroke      Past Surgical History:   Procedure Laterality Date     SECTION      HYSTERECTOMY        General Information       Row Name 25 1503          OT Time and Intention    Document Type evaluation  -SC     Mode of Treatment individual therapy;occupational therapy  -SC     Patient Effort good  -SC       Row Name 25 1509          General Information    Patient Profile Reviewed yes  -SC     Prior Level of Function independent:  Pt lives alone and PLOF is independence in ADLs/IADLs. She does not use an assistive device for mobility, has a  tub/shower combo w/chair and GB and required 3L oxygen at home.  -SC     Existing Precautions/Restrictions fall  -SC     Barriers to Rehab none identified  -SC       Row Name 02/04/25 1503          Occupational Profile    Reason for Services/Referral (Occupational Profile) Patient is a 64 year-old female admitted to [facility] on 2/3/2025 due to slurred speech and status post a fall.  OT consulted due to a decline in function and mobility.  No previous OT services for current condition.  -SC     Patient Goals (Occupational Profile) Patient wishes to return home at prior level of function  -SC       Row Name 02/04/25 1503          Living Environment    People in Home alone  -SC       Row Name 02/04/25 1503          Home Main Entrance    Number of Stairs, Main Entrance none  -SC     Stair Railings, Main Entrance none  -SC       Row Name 02/04/25 1503          Stairs Within Home, Primary    Number of Stairs, Within Home, Primary none  -SC     Stair Railings, Within Home, Primary none  -SC       Row Name 02/04/25 1503          Cognition    Orientation Status (Cognition) person;place  -SC       Row Name 02/04/25 1503          Safety Issues/Impairments Affecting Functional Mobility    Impairments Affecting Function (Mobility) balance;strength  -SC               User Key  (r) = Recorded By, (t) = Taken By, (c) = Cosigned By      Initials Name Provider Type    SC Miladys Ramírez OT Occupational Therapist                     Mobility/ADL's       Row Name 02/04/25 1506          Bed Mobility    Bed Mobility bed mobility (all) activities  -SC     All Activities, [place] (Bed Mobility) standby assist  -SC       Row Name 02/04/25 1506          Transfers    Transfers sit-stand transfer;stand-sit transfer;bed-chair transfer;toilet transfer  -SC       Row Name 02/04/25 1506          Bed-Chair Transfer    Bed-Chair [place] (Transfers) contact guard  -SC       Row Name 02/04/25 1506          Sit-Stand Transfer    Sit-Stand  [place] (Transfers) standby assist  -[facility] Name 02/04/25 1506          Stand-Sit Transfer    Stand-Sit [place] (Transfers) standby assist  -[facility] Name 02/04/25 1506          Toilet Transfer    [place] Level (Toilet Transfer) contact guard  -[facility] Name 02/04/25 1506          Functional Mobility    Functional Mobility- Comment Initially, patient ambulated from bed to bathroom w/ no device at [facility].   Upon trial of walker, patient able to walk from bed to bathroom at [facility]. Clinician encouraged pt to use walker until PT instructs further.  -[facility] Name 02/04/25 1506          Activities of Daily Living    BADL Assessment/Intervention bathing;upper body dressing;lower body dressing;grooming;toileting  -[facility] Name 02/04/25 1506          Upper Body Dressing Assessment/Training    [place] Level (Upper Body Dressing) standby assist;set up  -SC       Row Name 02/04/25 1506          Lower Body Dressing Assessment/Training    [place] Level (Lower Body Dressing) set up;standby assist  -SC       Row Name 02/04/25 1506          Grooming Assessment/Training    [place] Level (Grooming) set up;standby assist  -[facility] Name 02/04/25 1506          Toileting Assessment/Training    [place] Level (Toileting) standby assist;set up  -SC               User Key  (r) = Recorded By, (t) = Taken By, (c) = Cosigned By      Initials Name Provider Type    Miladys Garza OT Occupational Therapist                   Obj/Interventions       [facility] Name 02/04/25 1509          Sensory Assessment (Somatosensory)    Sensory Assessment (Somatosensory) sensation intact  -[facility] Name 02/04/25 1509          Vision Assessment/Intervention    Visual Impairment/Limitations corrective lenses for reading  -[facility] Name 02/04/25 1509          Range of Motion Comprehensive    General Range of Motion no range of motion deficits identified  -[facility] Name 02/04/25 1503           Strength Comprehensive (MMT)    Comment, General Manual Muscle Testing (MMT) Assessment Left UE strength 4-/5; Right UE strength 4/5  -[facility] Name 02/04/25 1509          Motor Skills    Motor Skills functional endurance;coordination  -SC     Coordination WFL  -SC     Functional Endurance fair  -SC       Row Name 02/04/25 1509          Balance    Dynamic Standing Balance contact guard  -SC               User Key  (r) = Recorded By, (t) = Taken By, (c) = Cosigned By      Initials Name Provider Type    Miladys Garza OT Occupational Therapist                   Goals/Plan    No documentation.                  Clinical Impression       Row Name 02/04/25 1510          Pain Assessment    Pretreatment Pain Rating 0/10 - no pain  -SC     Posttreatment Pain Rating 0/10 - no pain  -[facility] Name 02/04/25 1510          Plan of Care Review    Plan of Care Reviewed With patient  -SC     Progress no change  Evaluation  -SC     Outcome Evaluation Patient presents wit mild left upper extremity residual weakness from previous stroke and mild balance deficits. These limitations do not impair patient ability to complete basic ADL routine but may impact safety durng IADL function. Due to the fact patient lives alone, recommend  services post hospital stay to assist patient in returning home at max level of function and safety.  -SC       Row Name 02/04/25 1510          Therapy Assessment/Plan (OT)    Rehab Potential (OT) good  -SC     Therapy Frequency (OT) evaluation only  -[facility] Name 02/04/25 1510          Therapy Plan Review/Discharge Plan (OT)    Anticipated Discharge Disposition (OT) home with home health  Out patient PT to address balance defecits.  -[facility] Name 02/04/25 1510          Positioning and Restraints    Pre-Treatment Position in bed  -SC     Post Treatment Position bed  -SC     In Bed call light within reach;encouraged to call for assist;exit alarm on  -SC               User Key  (r) =  Recorded By, (t) = Taken By, (c) = Cosigned By      Initials Name Provider Type    SC Miladys Ramírez OT Occupational Therapist                   Outcome Measures       Row Name 02/04/25 1513 02/04/25 1100       How much help from another person do you currently need...    Turning from your back to your side while in flat bed without using bedrails? 4  -SC 4  -ANTIONETTE    Moving from lying on back to sitting on the side of a flat bed without bedrails? 4  -SC 4  -ANTIONETTE    Moving to and from a bed to a chair (including a wheelchair)? 4  -SC 4  -ANTIONETTE    Standing up from a chair using your arms (e.g., wheelchair, bedside chair)? 4  -SC 4  -ANTIONETTE    Climbing 3-5 steps with a railing? 4  -SC 3  -ANTIONETTE    To walk in hospital room? 4  -SC 3  -ANTIONETTE    AM-PAC 6 Clicks Score (PT) 24  -SC 22  -ANTIONETTE      Row Name 02/04/25 0705          How much help from another person do you currently need...    Turning from your back to your side while in flat bed without using bedrails? 4  -CR     Moving from lying on back to sitting on the side of a flat bed without bedrails? 4  -CR     Moving to and from a bed to a chair (including a wheelchair)? 4  -CR     Standing up from a chair using your arms (e.g., wheelchair, bedside chair)? 4  -CR     Climbing 3-5 steps with a railing? 3  -CR     To walk in hospital room? 3  -CR     AM-PAC 6 Clicks Score (PT) 22  -CR       Row Name 02/04/25 1513          Functional Assessment    Outcome Measure Options Modified Barthel Index;Optimal Instrument  -SC       Row Name 02/04/25 1513          Optimal Instrument    Optimal Instrument Optimal - 3  -SC     Bending/Stooping 2  -SC     Standing 1  -SC     Reaching 1  -SC     From the list, choose the 3 activities you would most like to be able to do without any difficulty Standing;Reaching;Bending/stooping  -SC     Total Score Optimal - 3 4  -SC               User Key  (r) = Recorded By, (t) = Taken By, (c) = Cosigned By      Initials Name Provider Type    ANTIONETTE Trung Dominique, PT  Physical Therapist    CR Reyes, Celina, RN Registered Nurse    Miladys Garza OT Occupational Therapist                    Occupational Therapy Education       Title: PT OT SLP Therapies (Done)       Topic: Occupational Therapy (Done)       Point: ADL training (Done)       Description:   Instruct learner(s) on proper safety adaptation and remediation techniques during self care or transfers.   Instruct in proper use of assistive devices.                  Learning Progress Summary            Patient Acceptance, E, VU by SC at 2/4/2025 1514                      Point: Home exercise program (Done)       Description:   Instruct learner(s) on appropriate technique for monitoring, assisting and/or progressing therapeutic exercises/activities.                  Learning Progress Summary            Patient Acceptance, E, VU by SC at 2/4/2025 1514                      Point: Precautions (Done)       Description:   Instruct learner(s) on prescribed precautions during self-care and functional transfers.                  Learning Progress Summary            Patient Acceptance, E, VU by SC at 2/4/2025 1514                      Point: Body mechanics (Done)       Description:   Instruct learner(s) on proper positioning and spine alignment during self-care, functional mobility activities and/or exercises.                  Learning Progress Summary            Patient Acceptance, E, VU by SC at 2/4/2025 1514                                      User Key       Initials Effective Dates Name Provider Type Discipline    SC 02/05/24 -  Miladys Ramírez OT Occupational Therapist OT                  OT Recommendation and Plan  Therapy Frequency (OT): evaluation only  Plan of Care Review  Plan of Care Reviewed With: patient  Progress: no change (Evaluation)  Outcome Evaluation: Patient presents wit mild left upper extremity residual weakness from previous stroke and mild balance deficits. These limitations do not impair patient ability to  complete basic ADL routine but may impact safety durng IADL function. Due to the fact patient lives alone, recommend  services post hospital stay to assist patient in returning home at max level of function and safety.     Time Calculation:   Evaluation Complexity (OT)  Review Occupational Profile/Medical/Therapy History Complexity: expanded/moderate complexity  Assessment, Occupational Performance/Identification of Deficit Complexity: 1-3 performance deficits  Clinical Decision Making Complexity (OT): problem focused assessment/low complexity  Overall Complexity of Evaluation (OT): low complexity     Time Calculation- OT       Row Name 02/04/25 1514             Time Calculation- OT    OT Received On 02/04/25  -SC         Untimed Charges    OT Eval/Re-eval Minutes 35  -SC         Total Minutes    Untimed Charges Total Minutes 35  -SC       Total Minutes 35  -SC                User Key  (r) = Recorded By, (t) = Taken By, (c) = Cosigned By      Initials Name Provider Type    SC Miladys Ramírez OT Occupational Therapist                  Therapy Charges for Today       Code Description Service Date Service Provider Modifiers Qty    22507992649 HC OT EVAL MOD COMPLEXITY 3 2/4/2025 Miladys Ramírez OT GO 1                 Miladys Ramírez OT  2/4/2025

## 2025-02-04 NOTE — DISCHARGE SUMMARY
[facility]         HOSPITALIST  DISCHARGE SUMMARY    Patient Name: Gabriella Jha  : 1960  MRN: [id number]    Date of Admission: 2/3/2025  Date of Discharge:  2025  Primary Care Physician: Carolina Parra APRN    Consults       Date and Time Order Name Status Description    2/3/2025  3:15 PM Inpatient Hospitalist Consult              Active and Resolved Hospital Problems:  Active Hospital Problems    Diagnosis POA    **Ischemic stroke [I63.9] Yes      Resolved Hospital Problems   No resolved problems to display.       Hospital Course     Hospital Course:  Gabriella Jha is a 64 y.o. female with past history of COPD, stroke with mild left-sided deficits, hypertension, dyslipidemia, depression/anxiety, and vitamin D deficiency who presents with left-sided weakness and fall. She states that several times over the last several months she will have a fall due to weakness on her left side.  She states today that the left-sided weakness she had from her previous stroke is gotten much worse.  She had a pretty significant fall and as result she came to the ER for further evaluation. In the emergency department the vital signs are vital signs are as follows: Temperature 98.4, pulse 72, respiratory 17, blood pressure 103/69, patient was 87% on room air currently 93% on 2 L.  CBC shows no abnormalities.  CMP shows a creatinine of 1.15.  Urinalysis shows no abnormalities.  The head neck showed no LVO.  CT head shows no acute abnormality.  The rib shows a stable left perihilar cavitary lesion.  Patient will be admitted to the hospital for further management of recrudescence of old stroke versus new stroke.  Neurology was consulted.  CTA head and neck was nonsignificant.  CT cerebral perfusion study showed no CT perfusion findings suggesting ischemia or core infarction.  MRI brain without contrast was obtained showing no acute or subacute infarct or acute hemorrhage; did show atrophy and chronic  small vessel ischemic changes.  Patient states she feels much better today.  Neurology was consulted and followed the patient, deemed okay to be discharged from their standpoint.  Saturating well on room air.  6-minute walk test was obtained and she saturated in room air throughout.  She will not require oxygen to be discharged home with.    Patient is being discharged home with outpatient therapy today.  She is stable at the time of discharge.  She made a rapid recovery.  She will follow-up with PCP in 3-7 days, needs CBC and chemistries including renal function trended and follow-up.  Follow-up with neurology in 1-2 weeks.  Advised to be compliant with medications and diet.  Fall precautions.        DISCHARGE Follow Up Recommendations for labs and diagnostics: As mentioned above.      Day of Discharge     Vital Signs:  Temp:  [97.7 °F (36.5 °C)-98.6 °F (37 °C)] 98.1 °F (36.7 °C)  Heart Rate:  [65-77] 77  Resp:  [16-20] 18  BP: ()/(58-95) 124/68  Flow (L/min) (Oxygen Therapy):  [2] 2  Physical Exam:   Constitutional: Alert, awake, oriented x 3, sitting in bed comfortably and answering all questions appropriately.              Eyes: Pupils equal, sclerae anicteric, no conjunctival injection              Respiratory: Clear to auscultation bilaterally, nonlabored respirations               Cardiovascular: RRR, no murmurs, rubs, or gallops, palpable pedal pulses bilaterally              Gastrointestinal: Positive bowel sounds, soft, nontender, nondistended              Neurologic: AOx3, answering all questions appropriately.  No tremors.  Does have left-sided deficit which seems to be her baseline.                  Discharge Details        Discharge Medications        Continue These Medications        Instructions Start Date   albuterol sulfate  (90 Base) MCG/ACT inhaler  Commonly known as: PROVENTIL HFA;VENTOLIN HFA;PROAIR HFA   2 puffs, Inhalation, Every 4 Hours PRN      buPROPion  MG 12 hr  tablet  Commonly known as: WELLBUTRIN SR   Take 1 tablet by mouth 2 (Two) Times a Day.      clonazePAM 1 MG tablet  Commonly known as: KlonoPIN   1 mg, 2 Times Daily PRN      clopidogrel 75 MG tablet  Commonly known as: PLAVIX   75 mg, Daily      gabapentin 600 MG tablet  Commonly known as: NEURONTIN   Take 1 tablet by mouth 3 (Three) Times a Day.      IRON PO   1 tablet, Daily      losartan 50 MG tablet  Commonly known as: COZAAR   Take 1 tablet by mouth Daily.      rosuvastatin 20 MG tablet  Commonly known as: CRESTOR   Take 1 tablet by mouth Every Night.      venlafaxine  MG 24 hr capsule  Commonly known as: EFFEXOR-XR   150 mg, Daily               Allergies   Allergen Reactions    Clindamycin Hcl Rash       Discharge Disposition:  Home or Self Care    Diet:  Hospital:  Diet Order   Procedures    Diet: Cardiac; Healthy Heart (2-3 Na+); Fluid Consistency: Thin (IDDSI 0)       Discharge Activity:       CODE STATUS:  Code Status and Medical Interventions: CPR (Attempt to Resuscitate); Full Support   Ordered at: 02/03/25 1606     Level Of Support Discussed With:    Patient     Code Status (Patient has no pulse and is not breathing):    CPR (Attempt to Resuscitate)     Medical Interventions (Patient has pulse or is breathing):    Full Support       Future Appointments   Date Time Provider Department Center   4/7/2025  1:00 PM [address]   4/10/2025  1:00 PM Marcia Pardo APRN [facility]       Additional Instructions for the Follow-ups that You Need to Schedule       Ambulatory Referral to Neurology   As directed      In 1-2 weeks    Ambulatory Referral to Physical Therapy   As directed      Specialty needed: Evaluate and treat   Follow-up needed: Yes        Discharge Follow-up with PCP   As directed       Currently Documented PCP:    Carolina Parra APRN    PCP Phone Number:    [phone]     Follow Up Details: In 3-7 days; needs CBC and chemsitries trended during follow up.                 Pertinent  and/or Most Recent Results     PROCEDURES:       LAB RESULTS:      Lab 02/04/25  0443 02/03/25  1342   WBC 6.00 7.19   HEMOGLOBIN 13.9 13.5   HEMATOCRIT 44.7 41.9   PLATELETS 181 200   NEUTROS ABS 4.34 5.50   IMMATURE GRANS (ABS) 0.02 0.01   LYMPHS ABS 0.77 0.89   MONOS ABS 0.57 0.49   EOS ABS 0.27 0.26   MCV 95.7 92.7   PROTIME  --  12.7   APTT  --  32.6         Lab 02/04/25  0443 02/03/25  1342   SODIUM 141 140   POTASSIUM 5.0 4.4   CHLORIDE 102 100   CO2 31.2* 31.0*   ANION GAP 7.8 9.0   BUN 15 11   CREATININE 1.38* 1.15*   EGFR 42.6* 53.0*   GLUCOSE 72 71   CALCIUM 9.1 10.0   MAGNESIUM 2.2 2.0   HEMOGLOBIN A1C 5.20  --          Lab 02/04/25  0443 02/03/25  1342   TOTAL PROTEIN 6.7 7.3   ALBUMIN 3.6 4.0   GLOBULIN 3.1 3.3   ALT (SGPT) 9 12   AST (SGOT) 16 19   BILIRUBIN 0.2 0.2   ALK PHOS 90 99         Lab 02/03/25  1555 02/03/25  1342   HSTROP T 11 8   PROTIME  --  12.7   INR  --  0.94         Lab 02/04/25  0443   CHOLESTEROL 100   LDL CHOL 39   HDL CHOL 41   TRIGLYCERIDES 108         Lab 02/03/25  1555   ABO TYPING O   RH TYPING Negative   ANTIBODY SCREEN Negative         Brief Urine Lab Results  (Last result in the past 365 days)        Color   Clarity   Blood   Leuk Est   Nitrite   Protein   CREAT   Urine HCG        02/03/25 1439 Yellow   Clear   Negative   Trace   Negative   Negative                 Microbiology Results (last 10 days)       ** No results found for the last 240 hours. **            XR Chest 1 View    Result Date: 2/4/2025  Impression: 1.No acute cardiopulmonary findings. 2.Stable left perihilar consolidation and cavitary focus. Electronically Signed: David Herrmann MD  2/4/2025 11:52 AM EST  Workstation ID: [id number]    MRI Brain Without Contrast    Result Date: 2/3/2025  1.No acute or subacute infarct. No acute hemorrhage. 2.Atrophy and chronic small vessel ischemic change. Electronically Signed: Jaime Dela Cruz MD  2/3/2025 9:29 PM EST  Workstation ID: [id number]    CT Angiogram  Head w AI Analysis of LVO    Result Date: 2/3/2025  Impression: Major arterial vasculature within head and neck appears widely patent, with no hemodynamically significant stenosis, dissection, thrombus, or aneurysm. Electronically Signed: Delvis Zeng MD  2/3/2025 2:48 PM EST  Workstation ID: [id number]    CT Angiogram Neck    Result Date: 2/3/2025  Impression: Major arterial vasculature within head and neck appears widely patent, with no hemodynamically significant stenosis, dissection, thrombus, or aneurysm. Electronically Signed: Delvis Zeng MD  2/3/2025 2:48 PM EST  Workstation ID: [id number]    CT CEREBRAL PERFUSION WITH & WITHOUT CONTRAST    Result Date: 2/3/2025  No CT perfusion findings to suggest territorial ischemia or core infarct. Electronically Signed: Alexander Gilbert MD  2/3/2025 2:11 PM EST  Workstation ID: [id number]    XR Ribs Right With PA Chest    Result Date: 2/3/2025  Impression: 1.No acute displaced rib fractures. 2.Stable left perihilar cavitary lesion. Electronically Signed: Sherif Calixto MD  2/3/2025 1:27 PM EST  Workstation ID: [id number]    CT Head Without Contrast    Result Date: 2/3/2025  Impression: No acute intracranial process identified. Electronically Signed: Delvis Zeng MD  2/3/2025 1:20 PM EST  Workstation ID: [id number]              Results for orders placed during the hospital encounter of 02/03/25    Adult Transthoracic Echo Complete W/ Cont if Necessary Per Protocol (With Agitated Saline)    Interpretation Summary    Left ventricular systolic function is normal. Left ventricular ejection fraction appears to be 56 - 60%.    Left ventricular wall thickness is consistent with borderline concentric hypertrophy.    Left ventricular diastolic function is consistent with (grade I) impaired relaxation.    Saline bubble contrast study is positive with valsalva manuever for right to left atrial level shunt.  This indicates a small patent foramen ovale.    There are no significant  valvular abnormalities.      Labs Pending at Discharge:        Time spent on Discharge including face to face service:  35 minutes    Electronically signed by Penny Jacinto MD, 02/04/25, 1:24 PM EST.

## 2025-02-04 NOTE — THERAPY EVALUATION
Acute Care - Speech Language Pathology   Swallow Initial Evaluation ABDULAZIZ Tate     Patient Name: Gabriella Jha  : 1960  MRN: [id number]  Today's Date: 2025               Admit Date: 2/3/2025    Visit Dx:     ICD-10-CM ICD-9-CM   1. Left-sided weakness  R53.1 728.87     Patient Active Problem List   Diagnosis    Abdominal hernia    Anxiety disorder    Cerebral infarction    Chronic obstructive pulmonary disease    Chronic tension-type headache, intractable    Depressive disorder    Diastolic congestive heart failure    Diverticulitis    Gastroesophageal reflux disease    Headache    Hyperlipidemia    Hypertension    Indeterminate colitis    Major depressive disorder with single episode    Methicillin susceptible Staphylococcus aureus infection    Other hereditary and idiopathic neuropathies    Peripheral neuropathic pain    Postmenopausal state    Requests hormone replacement therapy    Special screening for malignant neoplasms, colon    Lung cancer    Malignant neoplasm of upper lobe, right bronchus or lung    Encounter for screening colonoscopy    Hypoxia    Legionella pneumonia    Iron deficiency anemia    Ischemic stroke     Past Medical History:   Diagnosis Date    Anxiety     CHF (congestive heart failure)     COPD (chronic obstructive pulmonary disease)     GERD (gastroesophageal reflux disease)     Hyperlipemia     Hypertension     Lung cancer     Migraines     Stroke      Past Surgical History:   Procedure Laterality Date     SECTION      HYSTERECTOMY           Inpatient Speech Pathology Dysphagia Evaluation        PAIN SCALE: did not report    PRECAUTIONS/CONTRAINDICATIONS:  post-stroke, fall risk    SUSPECTED ABUSE/NEGLECT/EXPLOITATION:  no    SOCIAL/PSYCHOLOGICAL NEEDS/BARRIERS:  post-stroke    PAST SOCIAL HISTORY:  patient smokes, lives at home    PRIOR FUNCTION:  Patient is a current smoker, lives at home.     PATIENT GOALS/EXPECTATIONS:  did not report    HISTORY:  Patient is a 64  y.o. female who was admitted to the hospital for possible stroke after a fall. Patient suffered from a stroke approximately 4 years ago and reports no deficits afterward. CT angiogram results: Impression: Major arterial vasculature within head and neck appears widely patent, with no hemodynamically significant stenosis, dissection, thrombus, or aneurysm. CT Cerebral perfusion, XR ribs were negative. Head CT negative. MRI results: IMPRESSION:  1.No acute or subacute infarct. No acute hemorrhage.  2.Atrophy and chronic small vessel ischemic change.    Patient tolerated regular diet.    CURRENT DIET LEVEL:  Regular diet, cardiac healthy    OBJECTIVE:    TEST ADMINISTERED:  Oral mechanism, bedside swallow evaluation.    COGNITION/SAFETY AWARENESS:  Alert    BEHAVIORAL OBSERVATIONS:  Patient was alert and cooperative    ORAL MOTOR EXAM:  Labial strength and range of motion reduced with left-sided weakness, lingual strength and range of motion appear to be within functional limits. When asked about development of left-sided weakness, patient appeared confused.    VOICE QUALITY:  Functional    REFLEX EXAM:  N/A    POSTURE:  upright in bed    FEEDING/SWALLOWING FUNCTION:  assessed with puree consistencies, regular solids, thin liquids.    CLINICAL OBSERVATIONS:  Oral phase appears to be within functional limits for trials presented, despite left-sided facial droop. Pharyngeal phase appears timely with good laryngeal elevation per palpation. Patient tolerate trials of puree, solids, and thin liquids without overt clinical s/s of aspiration.    DYSPHAGIA CRITERIA:  Dysphagia, risk of aspiration    FUNCTIONAL ASSESSMENT INSTRUMENT: Patient currently scored a level 7 of 7 on Functional Communication Measures for swallowing indicating a 0% limitation in function.    SLP Recommendation and Plan  REHAB POTENTIAL:  Pt has good rehab potential.      RECOMMENDATIONS:   1.   DIET: Regular-thin liquids    2.  POSITION: 90 degrees    3.   COMPENSATORY STRATEGIES: small bites/drinks, meds taken oral    Yes Pt/responsible party agrees with plan of care and has been informed of all alternatives, risks and benefits.    EDUCATION  The patient has been educated in the following areas:   Dysphagia (Swallowing Impairment).       Haleigh Browning, Speech Therapy Student  2/4/2025

## 2025-02-04 NOTE — DISCHARGE INSTR - APPOINTMENTS
Patient needs to follow up with (Neuro) on 2/18/25 @10:30am [phone]  Patient needs to make a follow up appointment with Carolina Parra(PCP) in 1 week. [phone]

## 2025-02-04 NOTE — PLAN OF CARE
Goal Outcome Evaluation:  Plan of Care Reviewed With: patient        Progress: improving  Outcome Evaluation: Patient deemed medically stable for discharge per Dr. Jacinto. Patient is discharging home.

## 2025-02-04 NOTE — PROGRESS NOTES
Walking Oximetry Progress Note      Patient Name:  Gabriella Jha  YOB: 1960  Date of Procedure: 02/04/25              ROOM AIR BASELINE   SpO2% 90   Heart Rate 81     EXERCISE ON ROOM AIR SpO2% EXERCISE ON O2 LPM SpO2%   1 MINUTE 90 1 MINUTE     2 MINUTES 91 2 MINUTES     3 MINUTES 91 3 MINUTES     4 MINUTES 92 4 MINUTES     5 MINUTES 90 5 MINUTES     6 MINUTES 92 6 MINUTES                Time to Recovery  NA   SpO2% Post Exercise  91 on room air.    HR Post Exercise  84     Comments:           Electronically signed by Saulo Molina CRT, 02/04/25, 2:30 PM EST.

## 2025-02-04 NOTE — PLAN OF CARE
Goal Outcome Evaluation:  Plan of Care Reviewed With: patient           Outcome Evaluation: pt has deficits with strength, balance, and safe ambulation. pt will benefit from skilled PT services    Anticipated Discharge Disposition (PT): home with outpatient therapy services

## 2025-02-04 NOTE — CONSULTS
Consult received per Stroke Protocol. Patient without a noted DM diagnosis, with a current HbA1c of 5.2%, and an estimated average glucose of 103 mg/dl. Blood glucose values have remained WDL, with a low of 71 mg/dl, and a high of 83 mg/dl.

## 2025-02-04 NOTE — CONSULTS
Primary Care Provider: No ref. provider found     Consult requested by: Admitting team    Reason for Consultation: Neurological evaluation /stroke alert    History taken from: patient chart RN    Chief complaint: Left-sided weakness       SUBJECTIVE:    History of present illness: Background per H&P: Gabriella Jha is a 64 y.o. female who was evaluated in room 210 bed 2    Source of information is the patient and the medical records    The patient's nurse was present throughout    This is a very pleasant 64-year-old female who presented with some left-sided numbness and questionable weakness.  She told me it was bilateral but there was some preponderance for the left side.  She did not report the fall to me.  No loss of consciousness.    Her MRI and other test are okay    She wants to go home    Her CTA was okay she is not tenecteplase candidate and not otherwise intervention candidate        She takes her medication regularly      No migraines or seizures              Vital signs are stable  Hemoglobin A1c 5.20  LDL 39, on rosuvastatin 20 mg at home  On clopidogrel at home    MRI brain unremarkable for anything new  CTA okay      [facility]        Patient Name:   Gabriella Jha  YOB: 1960  Identification Number:   MRN - [id number]  Date of Service:   02/03/2025 13:27:27     Diagnosis:        I63.89 - Cerebrovascular accident (CVA) due to other mechanism ([facility])        W19.XXXA - Fall     Impression:   Pt is a 64 YOF (right handed) with PMH of HTN, HLD, CHF, ANXIETY D/O, LUNG CA, MIGRIANES, prior CVA with left sided weakness who presented with fall, slurred speech, unsteady gait. NIHSS: 4. Deferred thrombolytics. Prelim CTAs negative for LVO. Admit for TIA/STROKE vs TOXIC/METABOLIC/INFECTIOUS process.      Monitor neuro checks/VS q4h with telemetry.   Recommend fall precautions and seizure precautions.   Goal SBP b/w 160-180 today, 140-160 ->  100-140 afterwards.   Cont. PLAVIX + STATIN if no contraindications.   Get MRI BRAIN W/O and ECHO.   Get BLOOD CX x2, COVID, UDS, ETOH, ESR/CRP, TROP, CK, TSH, B12, BNP, LACTIC ACID, LIPID PANEL, and A1C.   Get WORKUP for TOXIC/METABOLIC/INFECTIOUS causes.   PT/OT/ST eval.   STOP TOBACCO USE     Our recommendations are outlined below.     Recommendations:           Stroke/Telemetry Floor        Neuro Checks        Bedside Swallow Eval        DVT Prophylaxis        IV Fluids, Normal Saline        Head of Bed 30 Degrees        Euglycemia and Avoid Hyperthermia (PRN Acetaminophen)     Sign Out:        Discussed with Emergency Department Provider           ------------------------------------------------------------------------------     Advanced Imaging:  CTA Head and Neck Completed.     CTP Completed.     LVO:No     Patient in not a candidate for ISA        Metrics:  Last Known Well: 02/02/2025 22:00:00  Dispatch Time: 02/03/2025 13:26:31  Arrival Time: 02/03/2025 11:36:00  Initial Response Time: 02/03/2025 13:28:16  Symptoms: fall, slurred speech, unsteady gait.  Initial patient interaction: 02/03/2025 13:33:23  NIHSS Assessment Completed: 02/03/2025 13:40:52  Patient is not a candidate for Thrombolytic.  Thrombolytic Medical Decision: 02/03/2025 13:41:00  Patient was not deemed candidate for Thrombolytic because of following reasons:  LKW outside 4.5 hr window. .     CT head showed no acute hemorrhage or acute core infarct.     CTA: prelim negative for LVO. Major arterial vasculature within head and neck appears widely patent, with no hemodynamically significant stenosis, dissection, thrombus, or aneurysm.     CTP: No CT perfusion findings to suggest territorial ischemia or core infarct.      Primary Provider Notified of Diagnostic Impression and Management Plan on: 02/03/2025 14:02:41           ------------------------------------------------------------------------------     History of Present Illness:  Patient is  a 64 year old Female.     Patient was brought by private transportation with symptoms of fall, slurred speech, unsteady gait.  Pt is a 64 YOF (right handed) with PMH of HTN, HLD, CHF, ANXIETY D/O, LUNG CA, MIGRIANES, prior CVA with left sided weakness who presented with fall, slurred speech, unsteady gait. She was last normal last night at 2200 prior to bed and woke up and had fall at some point in night, LOC unclear. She denied any head trauma, no BI/UI, no radicular neck/back pain.        Past Medical History:       Hypertension       Hyperlipidemia       Stroke     Medications:     No Anticoagulant use   Antiplatelet use: Yes PLAVIX  Reviewed EMR for current medications     Allergies:   Reviewed     Social History:  Smoking: Yes  Alcohol Use: No  Drug Use: No     Family History:     There is no family history of premature cerebrovascular disease pertinent to this consultation     ROS :  14 Points Review of Systems was performed and was negative except mentioned in HPI.     Past Surgical History:  There Is No Surgical History Contributory To Today’s Visit           Examination:  BP(111/71), Pulse(85), Blood Glucose(74)  1A: Level of Consciousness - Alert; keenly responsive + 0  1B: Ask Month and Age - Both Questions Right + 0  1C: Blink Eyes & Squeeze Hands - Performs Both Tasks + 0  2: Test Horizontal Extraocular Movements - Normal + 0  3: Test Visual Fields - No Visual Loss + 0  4: Test Facial Palsy (Use Grimace if Obtunded) - Minor paralysis (flat nasolabial fold, smile asymmetry) + 1  5A: Test Left Arm Motor Drift - No Drift for 10 Seconds + 0  5B: Test Right Arm Motor Drift - No Drift for 10 Seconds + 0  6A: Test Left Leg Motor Drift - Drift, but doesn't hit bed + 1  6B: Test Right Leg Motor Drift - No Drift for 5 Seconds + 0  7: Test Limb Ataxia (FNF/Heel-Shin) - No Ataxia + 0  8: Test Sensation - Normal; No sensory loss + 0  9: Test Language/Aphasia - Mild-Moderate Aphasia: Some Obvious Changes, Without  Significant Limitation + 1  10: Test Dysarthria - Mild-Moderate Dysarthria: Slurring but can be understood + 1  11: Test Extinction/Inattention - No abnormality + 0     NIHSS Score: 4     NIHSS Free Text : Age/Month/Year: [birth year]. She had chronic left face/leg weakness. She had left leg sensation loss. She couldn't name watch.     Pre-Morbid Modified Rob Scale:  2 Points = Slight disability; unable to carry out all previous activities, but able to look after own affairs without assistance     Spoke with : ED  I reviewed the available imaging via Rapid and initiated discussion with the primary provider     This consult was conducted in real time using interactive audio and video technology. Patient was informed of the technology being used for this visit and agreed to proceed. Patient located in hospital and provider located at home/office setting.        Patient is being evaluated for possible acute neurologic impairment and high probability of imminent or life-threatening deterioration. I spent total of 34 minutes providing care to this patient, including time for face to face visit via telemedicine, review of medical records, imaging studies and discussion of findings with providers, the patient and/or family.        Dr Araseli Dhillon        - Portions of the above HPI were copied from previous encounters and edited as appropriate. PMH as detailed below.     Review of Systems   No fever chills rigors or sweats  No weight issues  No sleep problems  HEENT:  No speech problem, vision changes, facial asymmetry or pain, or neck problem  Chest: No chest pain, clubbing, cyanosis, orthopnea palpitations but has CHF  Pulmonary:  No shortness of air, cough or expectoration has COPD, lung cancer  Abdomen:  No swelling/tension, constipation,diarrhea or pain has GERD  No genitourinary symptoms  Extremity problems as discussed  No back problem  No psychotic issues  Neurologic issues as discussed  No hematologic,  dermatologic or endocrine problems        PATIENT HISTORY:  Past Medical History:   Diagnosis Date    Anxiety     CHF (congestive heart failure)     COPD (chronic obstructive pulmonary disease)     GERD (gastroesophageal reflux disease)     Hyperlipemia     Hypertension     Lung cancer     Migraines     Stroke    ,   Past Surgical History:   Procedure Laterality Date     SECTION      HYSTERECTOMY     , History reviewed. No pertinent family history.,   Social History     Tobacco Use    Smoking status: Every Day     Current packs/day: 0.25     Average packs/day: 0.3 packs/day for 50.2 years (12.6 ttl pk-yrs)     Types: Cigarettes     Start date: 1974    Smokeless tobacco: Never   Vaping Use    Vaping status: Never Used   Substance Use Topics    Alcohol use: Not Currently    Drug use: Never   ,   Prior to Admission medications    Medication Sig Start Date End Date Taking? Authorizing Provider   albuterol sulfate  (90 Base) MCG/ACT inhaler Inhale 2 puffs Every 4 (Four) Hours As Needed for Wheezing or Shortness of Air. 23  Yes Marcia Pardo APRN   buPROPion SR (WELLBUTRIN SR) 150 MG 12 hr tablet Take 1 tablet by mouth 2 (Two) Times a Day. 10/28/24  Yes Estefany Washington MD   clonazePAM (KlonoPIN) 1 MG tablet Take 1 tablet by mouth 2 (Two) Times a Day As Needed.   Yes Estefany Washington MD   clopidogrel (PLAVIX) 75 MG tablet Take 1 tablet by mouth Daily. 21  Yes Estefany Washington MD   Ferrous Sulfate (IRON PO) Take 1 tablet by mouth Daily.   Yes Estefany Washington MD   gabapentin (NEURONTIN) 600 MG tablet Take 1 tablet by mouth 3 (Three) Times a Day. 24  Yes Estefany Washington MD   losartan (COZAAR) 50 MG tablet Take 1 tablet by mouth Daily. 10/28/24  Yes Estefany Washington MD   rosuvastatin (CRESTOR) 20 MG tablet Take 1 tablet by mouth Every Night. 24  Yes Estefany Washington MD   venlafaxine XR (EFFEXOR-XR) 150 MG 24 hr capsule Take 1 capsule by mouth  Daily. 9/3/21  Yes Provider, MD Estefany    Allergies:  Clindamycin hcl    Current Facility-Administered Medications   Medication Dose Route Frequency Provider Last Rate Last Admin    arformoterol (BROVANA) nebulizer solution 15 mcg  15 mcg Nebulization BID - RT Kaiden Stringer MD   15 mcg at 02/04/25 0925    aspirin chewable tablet 81 mg  81 mg Oral Daily Kaiden Stringer MD   81 mg at 02/04/25 0826    Or    aspirin suppository 300 mg  300 mg Rectal Daily Kaiden Stringer MD        atorvastatin (LIPITOR) tablet 80 mg  80 mg Oral Nightly Kaiden Stringer MD   80 mg at 02/03/25 2146    budesonide (PULMICORT) nebulizer solution 0.5 mg  0.5 mg Nebulization BID - RT Kaiden Stringer MD   0.5 mg at 02/04/25 0925    ipratropium-albuterol (DUO-NEB) nebulizer solution 3 mL  3 mL Nebulization Q6H PRN Kaiden Stringer MD        sodium chloride 0.9 % flush 10 mL  10 mL Intravenous PRN Patrick Soares MD        sodium chloride 0.9 % flush 10 mL  10 mL Intravenous PRN Patrick Soares MD        sodium chloride 0.9 % flush 10 mL  10 mL Intravenous Q12H Kaiden Stringer MD   10 mL at 02/04/25 0826    sodium chloride 0.9 % flush 10 mL  10 mL Intravenous PRN Kaiden Stringer MD        sodium chloride 0.9 % infusion 40 mL  40 mL Intravenous PRN Kaiden Stringre MD            ________________________________________________________        OBJECTIVE:  Upon today's exam, patient resting comfortably in bed in no acute distress      The patient is awake and alert and oriented x3  Normal speech  Cranial nerve examination demonstrate:  Full fields of vision to confrontation  Pupils are round, reactive to light and accommodation and size of about 3 mm  No ptosis or nystagmus  Funduscopic examination was not successful  Eye movements are conjugate     Sensation on the face and scalp are normal  Muscles of mastication are normal and symmetric  Muscles of  facial expression are normal and symmetric  Hearing is intact bilaterally  Head turning and shoulder shrugs were  unremarkable  Tongue was midline  I could not visualize  oropharynx or uvula     Motor examination:  Normal bulk, tone and strength was 5/5 all over except very questionable 5 - in the left lower extremity  No fasciculations     Sensory examination:  Intact for soft touch, pain   Romberg was not evaluated     Reflexes:  0/4     Coordination:  Normal finger-to-nose to finger, rapid alternating movements and toe to finger     Gait:  Deferred     Toe signs:  Mute      NIH Stroke Scale  Interval: baseline  1a. Level of Consciousness: 0-->Alert, keenly responsive  1b. LOC Questions: 0-->Answers both questions correctly  1c. LOC Commands: 0-->Performs both tasks correctly  2. Best Gaze: 0-->Normal  3. Visual: 0-->No visual loss  4. Facial Palsy: 1-->Minor paralysis (flattened nasolabial fold, asymmetry on smiling) (residual from previous stroke)  5a. Motor Arm, Left: 0-->No drift, limb holds 90 (or 45) degrees for full 10 secs  5b. Motor Arm, Right: 0-->No drift, limb holds 90 (or 45) degrees for full 10 secs  6a. Motor Leg, Left: 1-->Drift, leg falls by the end of the 5-sec period but does not hit bed  6b. Motor Leg, Right: 0-->No drift, leg holds 30 degree position for full 5 secs  7. Limb Ataxia: 0-->Absent  8. Sensory: 0-->Normal, no sensory loss  9. Best Language: 0-->No aphasia, normal  10. Dysarthria: 1-->Mild-to-moderate dysarthria, patient slurs at least some words and, at worst, can be understood with some difficulty  11. Extinction and Inattention (formerly Neglect): 0-->No abnormality  Total (NIH Stroke Scale): 3         ________________________________________________________   RESULTS REVIEW:    VITAL SIGNS:   Temp:  [97.7 °F (36.5 °C)-98.7 °F (37.1 °C)] 98.1 °F (36.7 °C)  Heart Rate:  [65-85] 77  Resp:  [16-20] 18  BP: ()/(58-95) 107/77     LABS:      Lab 02/04/25  0443 02/03/25  1342   WBC 6.00 7.19   HEMOGLOBIN 13.9 13.5   HEMATOCRIT 44.7 41.9   PLATELETS 181 200   NEUTROS ABS 4.34 5.50   IMMATURE  GRANS (ABS) 0.02 0.01   LYMPHS ABS 0.77 0.89   MONOS ABS 0.57 0.49   EOS ABS 0.27 0.26   MCV 95.7 92.7   PROTIME  --  12.7   APTT  --  32.6         Lab 02/04/25  0443 02/03/25  1342   SODIUM 141 140   POTASSIUM 5.0 4.4   CHLORIDE 102 100   CO2 31.2* 31.0*   ANION GAP 7.8 9.0   BUN 15 11   CREATININE 1.38* 1.15*   EGFR 42.6* 53.0*   GLUCOSE 72 71   CALCIUM 9.1 10.0   MAGNESIUM 2.2 2.0   HEMOGLOBIN A1C 5.20  --          Lab 02/04/25  0443 02/03/25  1342   TOTAL PROTEIN 6.7 7.3   ALBUMIN 3.6 4.0   GLOBULIN 3.1 3.3   ALT (SGPT) 9 12   AST (SGOT) 16 19   BILIRUBIN 0.2 0.2   ALK PHOS 90 99         Lab 02/03/25  1555 02/03/25  1342   HSTROP T 11 8   PROTIME  --  12.7   INR  --  0.94         Lab 02/04/25  0443   CHOLESTEROL 100   LDL CHOL 39   HDL CHOL 41   TRIGLYCERIDES 108         Lab 02/03/25  1555   ABO TYPING O   RH TYPING Negative   ANTIBODY SCREEN Negative         UA          2/3/2025    14:39   Urinalysis   Squamous Epithelial Cells, UA 0-2    Specific Gravity, UA >1.030    Ketones, UA Negative    Blood, UA Negative    Leukocytes, UA Trace    Nitrite, UA Negative    RBC, UA 3-5    WBC, UA 0-2    Bacteria, UA None Seen        Lab Results   Component Value Date    TSH 2.690 02/17/2020    LDL 39 02/04/2025    HGBA1C 5.20 02/04/2025       IMAGING STUDIES:  MRI Brain Without Contrast    Result Date: 2/3/2025  1.No acute or subacute infarct. No acute hemorrhage. 2.Atrophy and chronic small vessel ischemic change. Electronically Signed: Jaime Dela Cruz MD  2/3/2025 9:29 PM EST  Workstation ID: [id number]    CT Angiogram Head w AI Analysis of LVO    Result Date: 2/3/2025  Impression: Major arterial vasculature within head and neck appears widely patent, with no hemodynamically significant stenosis, dissection, thrombus, or aneurysm. Electronically Signed: Delvis Zeng MD  2/3/2025 2:48 PM EST  Workstation ID: [id number]    CT Angiogram Neck    Result Date: 2/3/2025  Impression: Major arterial vasculature within head and  neck appears widely patent, with no hemodynamically significant stenosis, dissection, thrombus, or aneurysm. Electronically Signed: Delvis Zeng MD  2/3/2025 2:48 PM EST  Workstation ID: [id number]    CT CEREBRAL PERFUSION WITH & WITHOUT CONTRAST    Result Date: 2/3/2025  No CT perfusion findings to suggest territorial ischemia or core infarct. Electronically Signed: Alexander Gilbert MD  2/3/2025 2:11 PM EST  Workstation ID: [id number]    XR Ribs Right With PA Chest    Result Date: 2/3/2025  Impression: 1.No acute displaced rib fractures. 2.Stable left perihilar cavitary lesion. Electronically Signed: Sherif Calitxo MD  2/3/2025 1:27 PM EST  Workstation ID: [id number]    CT Head Without Contrast    Result Date: 2/3/2025  Impression: No acute intracranial process identified. Electronically Signed: Delvis Zeng MD  2/3/2025 1:20 PM EST  Workstation ID: [id number]     I reviewed the patient's new clinical results.    ________________________________________________________     PROBLEM LIST:    Ischemic stroke            ASSESSMENT/PLAN:  Very questionable left leg weakness she is doing great        She is on rosuvastatin and clopidogrel at home    I was initially concerned that there was nothing focal but the patient did say that she had some left-sided symptoms and even facial weakness so I will add aspirin and make it DAPT at least for 3 weeks    As we do not do testing for Plavix I have recommended that whenever she sees her primary care physician they may want to consider doing P2 Y124 Plavix and if it is abnormal then they can switch her to Brilinta      Other labs were reviewed  She is obviously not tenecteplase intervention candidate    She may be discharged to follow-up with neurology as outpatient      Modification of stroke risk factors:   - Blood pressure should be less than 130/80 outpatient, HbA1c less than 6.5, LDL less than 70; b12>500 and smoking cessation if applicable. We would be grateful if the  primary team / primary care physician would keep a close watch on the above targets.  - Stroke education  - Follow up with neurologist of choice      I discussed the patient's findings and my recommendations with patient, family, nursing staff, and primary care team    Radha Whitlock MD  02/04/25  10:11 EST

## 2025-02-05 NOTE — OUTREACH NOTE
Prep Survey      Flowsheet Row Responses   [facility] patient discharged from? [facility]   Is LACE score < 7 ? No   Eligibility Readm Mgmt   Discharge diagnosis *Ischemic stroke (   Does the patient have one of the following disease processes/diagnoses(primary or secondary)? Stroke   Does the patient have Home health ordered? No   Is there a DME ordered? No   Prep survey completed? Yes            DENIZ MOORE - Registered Nurse

## 2025-02-13 ENCOUNTER — READMISSION MANAGEMENT (OUTPATIENT)
Dept: CALL CENTER | Facility: HOSPITAL | Age: 65
End: 2025-02-13
Payer: MEDICARE

## 2025-02-13 NOTE — OUTREACH NOTE
Stroke Week 1 Survey      Flowsheet Row Responses   [facility] facility patient discharged from? [facility]   Does the patient have one of the following disease processes/diagnoses(primary or secondary)? Stroke   Week 1 attempt successful? No   Unsuccessful attempts Attempt 1            DENIZ MOORE - Registered Nurse

## 2025-02-18 ENCOUNTER — READMISSION MANAGEMENT (OUTPATIENT)
Dept: CALL CENTER | Facility: HOSPITAL | Age: 65
End: 2025-02-18
Payer: MEDICARE

## 2025-02-18 NOTE — OUTREACH NOTE
Stroke Week 1 Survey      Flowsheet Row Responses   [facility] patient discharged from? [facility]   Does the patient have one of the following disease processes/diagnoses(primary or secondary)? Stroke   Week 1 attempt successful? Yes   Call start time 1455   Discharge diagnosis *Ischemic stroke (   Person spoke with today (if not patient) and relationship Patient   Meds reviewed with patient/caregiver? Yes   Does the patient have all medications ordered at discharge? Yes   Prescription comments No concerns or questions noted.   Is the patient taking all medications as directed (includes completed medication regime)? Yes   Does the patient have a primary care provider?  Yes   Comments regarding PCP Advised to schedule fu appt with pcp.   Has home health visited the patient within 72 hours of discharge? N/A   Psychosocial issues? No   Did the patient receive a copy of their discharge instructions? Yes   Nursing interventions Reviewed instructions with patient   What is the patient's perception of their health status since discharge? Improving   Nursing interventions Nurse provided patient education   Is the patient/caregiver able to teach back the risk factors for a stroke? History of TIAs   Is the patient/caregiver able to teach back signs and symptoms related to disease process for when to call PCP? Yes   Is the patient/caregiver able to teach back signs and symptoms related to disease process for when to call 911? Yes   Is the patient/caregiver able to teach back the hierarchy of who to call/visit for symptoms/problems? PCP, Specialist, Home health nurse, Urgent Care, ED, 911 Yes   Is the patient able to teach back FAST for Stroke? E yes: Check for vision loss, B alance: Watch for sudden loss of balance, A rm: Check if one arm is weak, F ace: Look for an uneven smile, S peech: Listen for slurred speech, T mariela: Call 9-1-1 right away   Week 1 call completed? Yes   Is the patient interested in additional calls from  an ambulatory ? No   Would this patient benefit from a Referral to [facility] Social Work? No   Wrap up additional comments Patient reports doing well. No concerns or questions noted.isiah BARNHART - Registered Nurse

## 2025-04-07 ENCOUNTER — HOSPITAL ENCOUNTER (OUTPATIENT)
Dept: CT IMAGING | Facility: HOSPITAL | Age: 65
Discharge: HOME | End: 2025-04-07
Admitting: NURSE PRACTITIONER
Payer: MEDICARE

## 2025-04-07 DIAGNOSIS — C34.11 MALIGNANT NEOPLASM OF UPPER LOBE, RIGHT BRONCHUS OR LUNG: ICD-10-CM

## 2025-04-07 DIAGNOSIS — C34.12 MALIGNANT NEOPLASM OF UPPER LOBE OF LEFT LUNG: ICD-10-CM

## 2025-04-07 DIAGNOSIS — J44.9 CHRONIC OBSTRUCTIVE PULMONARY DISEASE, UNSPECIFIED COPD TYPE: ICD-10-CM

## 2025-04-07 DIAGNOSIS — R91.1 PULMONARY NODULE: ICD-10-CM

## 2025-04-07 DIAGNOSIS — Z92.3 HISTORY OF EXTERNAL BEAM RADIATION THERAPY: ICD-10-CM

## 2025-04-07 LAB
CREAT BLDA-MCNC: 1.1 MG/DL (ref 0.6–1.3)
EGFRCR SERPLBLD CKD-EPI 2021: 55.9 ML/MIN/1.73

## 2025-04-07 PROCEDURE — 71260 CT THORAX DX C+: CPT

## 2025-04-07 PROCEDURE — 25510000001 IOPAMIDOL PER 1 ML: Performed by: NURSE PRACTITIONER

## 2025-04-07 PROCEDURE — 82565 ASSAY OF CREATININE: CPT

## 2025-04-07 RX ORDER — IOPAMIDOL 755 MG/ML
100 INJECTION, SOLUTION INTRAVASCULAR
Status: COMPLETED | OUTPATIENT
Start: 2025-04-07 | End: 2025-04-07

## 2025-04-07 RX ADMIN — IOPAMIDOL 70 ML: 755 INJECTION, SOLUTION INTRAVENOUS at 13:08

## 2025-04-10 ENCOUNTER — OFFICE VISIT (OUTPATIENT)
Dept: RADIATION ONCOLOGY | Facility: HOSPITAL | Age: 65
End: 2025-04-10
Payer: MEDICARE

## 2025-04-10 VITALS
SYSTOLIC BLOOD PRESSURE: 102 MMHG | BODY MASS INDEX: 24.2 KG/M2 | DIASTOLIC BLOOD PRESSURE: 75 MMHG | OXYGEN SATURATION: 89 % | RESPIRATION RATE: 16 BRPM | HEART RATE: 82 BPM | WEIGHT: 128.09 LBS | TEMPERATURE: 98.4 F

## 2025-04-10 DIAGNOSIS — Z92.3 HISTORY OF EXTERNAL BEAM RADIATION THERAPY: ICD-10-CM

## 2025-04-10 DIAGNOSIS — C34.12 MALIGNANT NEOPLASM OF UPPER LOBE OF LEFT LUNG: Primary | ICD-10-CM

## 2025-04-10 DIAGNOSIS — C34.11 MALIGNANT NEOPLASM OF UPPER LOBE, RIGHT BRONCHUS OR LUNG: ICD-10-CM

## 2025-04-10 DIAGNOSIS — J44.9 CHRONIC OBSTRUCTIVE PULMONARY DISEASE, UNSPECIFIED COPD TYPE: ICD-10-CM

## 2025-04-10 DIAGNOSIS — R91.1 PULMONARY NODULE: ICD-10-CM

## 2025-04-10 DIAGNOSIS — F17.210 NICOTINE DEPENDENCE, CIGARETTES, UNCOMPLICATED: ICD-10-CM

## 2025-04-10 DIAGNOSIS — Z08 ENCOUNTER FOR FOLLOW-UP SURVEILLANCE OF LUNG CANCER: ICD-10-CM

## 2025-04-10 DIAGNOSIS — Z85.118 ENCOUNTER FOR FOLLOW-UP SURVEILLANCE OF LUNG CANCER: ICD-10-CM

## 2025-04-10 PROCEDURE — G0463 HOSPITAL OUTPT CLINIC VISIT: HCPCS | Performed by: NURSE PRACTITIONER

## 2025-04-10 NOTE — PROGRESS NOTES
Follow Up Office Visit      Encounter Date: 04/10/2025   Patient Name: Gabriella Jha  YOB: 1960   Medical Record Number: [id number]   Primary Diagnosis: Malignant neoplasm of upper lobe of left lung [C34.12]     Radiation Completion Date:     10/19/2022 RUL SBRT   7/1/2020 ETELVINA SBRT    Chief Complaint:    Chief Complaint   Patient presents with    Follow-up    Lung Cancer       Oncology/Hematology History   Malignant neoplasm of upper lobe, right bronchus or lung   9/30/2022 Initial Diagnosis    Malignant neoplasm of upper lobe, right bronchus or lung (HCC)     10/10/2022 - 10/19/2022 Radiation    Radiation OncologyTreatment Course:  Gabriella Jha received 5500 cGy in 5 fractions to right upper lobe via SBRT.         History of Present Illness: Gabriella Jha is a 64 y.o. female who returns to [facility] [place] [facility] for routine 3 month follow-up of her lung cancer.   History of Present Illness  She reports feeling well overall with no new complaints or concerns although she was recently hospitalized due to a significant decrease in oxygen levels, initially suspected to be a stroke. Her respiratory function has been stable, with no current issues reported. She reports mild shortness of breath with exertion that does not hinder her ADLs. She has occasional cough productive of clear sputum. Denies hemoptysis. She utilizes home oxygen therapy as needed, typically for a few hours at a time, which she reports as beneficial. She continues to experience occasional headaches, unchanged from prior visit.     Subjective      Review of Systems: Review of Systems   Constitutional:  Negative for appetite change, chills, fatigue, fever and unexpected weight change.   HENT:  Negative for sore throat and trouble swallowing.    Eyes:  Negative for visual disturbance.   Respiratory:  Positive for cough (Productive with white secretions, ongoing.) and wheezing (Occasionally, ongoing). Negative for chest  "tightness and shortness of breath.         Wears o2 at home as needed.     Cardiovascular:  Negative for chest pain and palpitations.   Gastrointestinal:  Positive for abdominal distention (\"feels tight\"), anal bleeding (x1 4-5 months ago, has not happened since.) and blood in stool (x1 4-5 months ago, \"Pure blood\", not happened since.). Negative for abdominal pain, constipation, diarrhea, nausea and vomiting.   Genitourinary:  Negative for difficulty urinating, dysuria, frequency, hematuria and urgency.   Musculoskeletal:  Negative for arthralgias, back pain, gait problem and joint swelling.   Skin:  Negative for color change and rash.   Neurological:  Negative for dizziness, tremors, seizures, syncope, speech difficulty, weakness, numbness and headaches. Facial asymmetry: Frequent, noted QOD, noted x1 year.  Noted at random times..  Psychiatric/Behavioral:  Negative for sleep disturbance.        The following portions of the patient's history were reviewed and updated as appropriate: allergies, current medications, past family history, past medical history, past social history, past surgical history and problem list.    Medications:     Current Outpatient Medications:     albuterol sulfate  (90 Base) MCG/ACT inhaler, Inhale 2 puffs Every 4 (Four) Hours As Needed for Wheezing or Shortness of Air., Disp: 18 g, Rfl: 4    buPROPion SR (WELLBUTRIN SR) 150 MG 12 hr tablet, Take 1 tablet by mouth 2 (Two) Times a Day., Disp: , Rfl:     clonazePAM (KlonoPIN) 1 MG tablet, Take 1 tablet by mouth 2 (Two) Times a Day As Needed., Disp: , Rfl:     clopidogrel (PLAVIX) 75 MG tablet, Take 1 tablet by mouth Daily., Disp: , Rfl:     Ferrous Sulfate (IRON PO), Take 1 tablet by mouth Daily., Disp: , Rfl:     gabapentin (NEURONTIN) 600 MG tablet, Take 1 tablet by mouth 3 (Three) Times a Day., Disp: , Rfl:     losartan (COZAAR) 50 MG tablet, Take 1 tablet by mouth Daily., Disp: , Rfl:     rosuvastatin (CRESTOR) 20 MG tablet, Take " 1 tablet by mouth Every Night., Disp: , Rfl:     venlafaxine XR (EFFEXOR-XR) 150 MG 24 hr capsule, Take 1 capsule by mouth Daily., Disp: , Rfl:     Allergies:   Allergies   Allergen Reactions    Clindamycin Hcl Rash       Patient Smoking History:   Social History     Tobacco Use   Smoking Status Every Day    Current packs/day: 0.25    Average packs/day: 0.3 packs/day for 50.4 years (12.6 ttl pk-yrs)    Types: Cigarettes    Start date: 11/18/1974   Smokeless Tobacco Never       Measures:  PHQ-9 Total Score: 3   Quality of Life: 90 - Limited Activity   ECOG score: 1  ECOG: Restricted in physically strenuous activity but ambulatory and able to carry out work of a light or sedentary nature, e.g., light house work, office work   Pain: (on a scale of 0-10)   Pain Score    04/10/25 1304   PainSc: 0-No pain     Objective     Physical Exam:   Vital Signs:   Vitals:    04/10/25 1304   BP: 102/75   Pulse: 82   Resp: 16   Temp: 98.4 °F (36.9 °C)   TempSrc: Oral   SpO2: (!) 89%   Weight: 58.1 kg (128 lb 1.4 oz)   PainSc: 0-No pain     Body mass index is 24.2 kg/m².   Wt Readings from Last 3 Encounters:   04/10/25 58.1 kg (128 lb 1.4 oz)   02/03/25 56.8 kg (125 lb 3.5 oz)   01/07/25 59.9 kg (132 lb 0.9 oz)       Physical Exam  Vitals reviewed.   Constitutional:       General: She is not in acute distress.     Appearance: Normal appearance. She is normal weight. She is not ill-appearing.   HENT:      Head: Normocephalic and atraumatic.      Mouth/Throat:      Mouth: Mucous membranes are moist.      Pharynx: Oropharynx is clear. No oropharyngeal exudate or posterior oropharyngeal erythema.   Eyes:      Conjunctiva/sclera: Conjunctivae normal.      Pupils: Pupils are equal, round, and reactive to light.   Cardiovascular:      Rate and Rhythm: Normal rate and regular rhythm.      Pulses: Normal pulses.      Heart sounds: Normal heart sounds.   Pulmonary:      Effort: Pulmonary effort is normal. No respiratory distress.      Breath  "sounds: No wheezing (faint expiratory wheeze), rhonchi or rales.      Comments: Decreased throughout. On RA.  Abdominal:      General: Bowel sounds are normal.      Palpations: Abdomen is soft.   Musculoskeletal:         General: Normal range of motion.      Cervical back: Normal range of motion.   Skin:     General: Skin is warm and dry.   Neurological:      General: No focal deficit present.      Mental Status: She is alert and oriented to person, place, and time. Mental status is at baseline.   Psychiatric:         Mood and Affect: Mood normal.         Behavior: Behavior normal.       Result Review: I independently reviewed the following data. I discussed findings of 4/7/25 CT chest with Dr. Kurtz and he independently reviewed images.   -- CT Chest With Contrast Diagnostic (04/07/2025 13:07)   -- MRI Brain Without Contrast (02/03/2025 20:59)   -- CT Angiogram Neck (02/03/2025 14:34)   -- CT Angiogram Head w AI Analysis of LVO (02/03/2025 14:34)   Results  Imaging  CT scan shows the area in the left upper part of the lung is slightly larger than it was in January.    Pathology: No results found for: \"CLININFO\", \"FINALDX\", \"SYNOPTIC\"    Imaging: CT Chest With Contrast Diagnostic  Result Date: 4/9/2025  Impression: CT scan of the chest with IV contrast demonstrating 3.5 cm x 1.3 cm left hilar mass, slightly larger in comparison to prior contrast-enhanced study 6/19/2024. 4 mm noncalcified nodule in the right middle lobe is unchanged. Electronically Signed: Rajesh Arango MD  4/9/2025 12:14 PM EDT  Workstation ID: [id number]    XR Chest 1 View  Result Date: 2/4/2025  Impression: 1.No acute cardiopulmonary findings. 2.Stable left perihilar consolidation and cavitary focus. Electronically Signed: David Herrmann MD  2/4/2025 11:52 AM EST  Workstation ID: [id number]    MRI Brain Without Contrast  Result Date: 2/3/2025  1.No acute or subacute infarct. No acute hemorrhage. 2.Atrophy and chronic small vessel ischemic change. " Electronically Signed: Jaime Dela Cruz MD  2/3/2025 9:29 PM EST  Workstation ID: [id number]    CT Angiogram Head w AI Analysis of LVO  Result Date: 2/3/2025  Impression: Major arterial vasculature within head and neck appears widely patent, with no hemodynamically significant stenosis, dissection, thrombus, or aneurysm. Electronically Signed: Delvis Zeng MD  2/3/2025 2:48 PM EST  Workstation ID: [id number]    CT Angiogram Neck  Result Date: 2/3/2025  Impression: Major arterial vasculature within head and neck appears widely patent, with no hemodynamically significant stenosis, dissection, thrombus, or aneurysm. Electronically Signed: Delvis Zeng MD  2/3/2025 2:48 PM EST  Workstation ID: [id number]    CT CEREBRAL PERFUSION WITH & WITHOUT CONTRAST  Result Date: 2/3/2025  No CT perfusion findings to suggest territorial ischemia or core infarct. Electronically Signed: Alexander Gilbert MD  2/3/2025 2:11 PM EST  Workstation ID: [id number]    XR Ribs Right With PA Chest  Result Date: 2/3/2025  Impression: 1.No acute displaced rib fractures. 2.Stable left perihilar cavitary lesion. Electronically Signed: Sherif Calixto MD  2/3/2025 1:27 PM EST  Workstation ID: [id number]    CT Head Without Contrast  Result Date: 2/3/2025  Impression: No acute intracranial process identified. Electronically Signed: Delvis Zeng MD  2/3/2025 1:20 PM EST  Workstation ID: [id number]     CT chest on 1/2/2025       Labs:   WBC   Date Value Ref Range Status   02/04/2025 6.00 3.40 - 10.80 10*3/mm3 Final     Hemoglobin   Date Value Ref Range Status   02/04/2025 13.9 12.0 - 15.9 g/dL Final     Hematocrit   Date Value Ref Range Status   02/04/2025 44.7 34.0 - 46.6 % Final     Platelets   Date Value Ref Range Status   02/04/2025 181 140 - 450 10*3/mm3 Final     Creatinine   Date Value Ref Range Status   04/07/2025 1.10 0.60 - 1.30 mg/dL Final     Comment:     Serial Number: [id number]:  [id number]     BUN   Date Value Ref Range Status   02/04/2025 15 8  - 23 mg/dL Final     eGFR   Date Value Ref Range Status   04/07/2025 55.9 (L) >60.0 mL/min/1.73 Final     TSH   Date Value Ref Range Status   02/17/2020 2.690 0.270 - 4.200 m[iU]/L Final     Assessment / Plan      Impression: Gabriella Jha is a pleasant 64 y.o. female with history of a PET avid left upper lobe nodule presumed primary lung malignancy in 2020. She was not a surgical candidate and was treated for clinical diagnosis of lung cancer. She is status post stereotactic body radiotherapy to left upper lobe, completed on 7/1/2020. She had an excellent radiographic response. She subsequently developed a FDG avid right upper lobe lesion consistent with primary lung cancer as per PET scan dated 9/20/2022. She is status post stereotactic body radiotherapy to the right upper lobe, completed on 10/19/2022. Her post-treatment CT scan of the chest without contrast on 1/9/2023 showed good radiographic treatment response as the previously noted noncalcified nodule in the right upper lobe had essentially resolved. I discussed findings of her CT chest on 5/6/24 as the left hilar soft tissue is slowly increasing over multiple prior comparisons in region of previously treated tumor. Dr. Kurtz independently reviewed images comparing 5/6/24 study to study on 1/9/23 and he does not appreciate a significant increase in size. Discussed findings with Ms. Jha and discussed recommendation of proceeding with EBUS and biopsy of this site. She is not interested in undergoing biopsy at that time monitored with short interval imaging in 6 weeks. Repeat CT chest on 6/19/24 reveals the left upper lobe perihilar masslike density remains unchanged from prior study but appears increased in size when compared to CT chest on 4/17/23 and remains suspicious for recurrent malignancy at the site of treated disease. She subsequently underwent PET/CT scan on 6/27/24 which demonstrates left perihilar airspace disease consistent with treatment  related change. This has low-level metabolic activity. No definite residual or recurrent disease identified. CT Chest on 1/2/25 independently reviewed by Dr. Kurtz and he favors the left upper lobe perihilar mass to be unchanged in size although it is slightly more consolidated. The left perihilar opacity is indistinguishable from hilar vessels so we will order next scan with IV contrast. There is an adjacent nodular and rounded cavitation that is larger and more pronounced compared to prior CT scan on 6/19/24, best seen on image 51. CT chest on 4/7/25 independently reviewed by Dr. Kurtz and the left hilar mass is slightly larger but mainly just more consolidation. Left hilar mass measures 3.5 x 1.3 cm. This measured 3.1 x 2 cm on 1/2/25. We will repeat imaging in 3 months for surveillance. If there is persistent enlargement, then PET/CT will be repeated.     Pulmonary nodule: CT chest on 4/7/25 demonstrates stable 4 mm right middle lobe nodule. Will continue to monitor on serial imaging.      COPD: patients reports known history of COPD. She has politely declined referral to Pulmonology. I have previously prescribed Albuterol inhaler and will be happy to refill prescription when needed.      Nicotine dependence: she is a current cigarette smoker; 3 cigarettes/day. She verbalizes understanding of need for smoking cessation and is actively decreasing her number of cigarettes per day.      Assessment/Plan:   Diagnoses and all orders for this visit:    1. Malignant neoplasm of upper lobe of left lung (Primary)  -     CT Chest With Contrast; Future    2. Malignant neoplasm of upper lobe, right bronchus or lung  -     CT Chest With Contrast; Future    3. History of external beam radiation therapy  -     CT Chest With Contrast; Future    4. Encounter for follow-up surveillance of lung cancer    5. Chronic obstructive pulmonary disease, unspecified COPD type    6. Pulmonary nodule  -     CT Chest With Contrast;  Future    7. Nicotine dependence, cigarettes, uncomplicated      Assessment & Plan  1. Respiratory distress.  Her CT scan results are satisfactory, with a slight increase in the size of the area in the left upper lobe of her lung compared to January 2025. This change is minimal and not currently alarming. Dr. Kurtz has expressed no immediate concerns, attributing the change to consolidation and evolving treatment-related alterations at this time. A follow-up CT scan will be scheduled for 3 months from now. If the area in the left upper lobe of her lung enlarges further on the subsequent CT scan, a repeat PET scan may be considered. If her respiratory condition deteriorates or if she requires hospitalization due to low oxygen levels, a consultation with a pulmonologist will be arranged. At this time she is continuing to decline referral to Pulmonology.     Follow Up:   Return for follow-up in 3 months with CT chest prior.     Return in about 3 months (around 7/10/2025) for Office Visit.  Gabriella Jha was encouraged to contact me in the interim with any questions or concerns regarding her care.      AMADOR Rangel  Radiation Oncology  [facility]    This document has been signed by AMADOR Banda on April 10, 2025 13:23 EDT     Patient or patient representative verbalized consent for the use of Ambient Listening during the visit with  AMADOR Banda for chart documentation. 4/10/2025  13:20 EDT

## 2025-07-14 ENCOUNTER — DOCUMENTATION (OUTPATIENT)
Dept: RADIATION ONCOLOGY | Facility: HOSPITAL | Age: 65
End: 2025-07-14
Payer: MEDICARE

## 2025-07-14 NOTE — PROGRESS NOTES
Patient was scheduled to see Marcia Pardo on 7/17 however she has recently changed her medical insurance and [facility] does not participate with her insurance.  Patient states that she is going to be changing her insurance back but will have to wait until October to do so.  I asked the patient to contact our office once she gets the insurance changed and we would reschedule her scans and a f/u with Marcia Pardo.  Patient v/u.

## 2025-08-29 DIAGNOSIS — C34.11 MALIGNANT NEOPLASM OF UPPER LOBE, RIGHT BRONCHUS OR LUNG: Primary | ICD-10-CM
